# Patient Record
Sex: MALE | Race: WHITE | Employment: OTHER | URBAN - METROPOLITAN AREA
[De-identification: names, ages, dates, MRNs, and addresses within clinical notes are randomized per-mention and may not be internally consistent; named-entity substitution may affect disease eponyms.]

---

## 2017-01-18 ENCOUNTER — OFFICE VISIT (OUTPATIENT)
Dept: FAMILY MEDICINE CLINIC | Age: 77
End: 2017-01-18

## 2017-01-18 ENCOUNTER — HOSPITAL ENCOUNTER (OUTPATIENT)
Dept: LAB | Age: 77
Discharge: HOME | End: 2017-01-18
Payer: MEDICARE

## 2017-01-18 VITALS
WEIGHT: 208.4 LBS | OXYGEN SATURATION: 96 % | HEIGHT: 68 IN | TEMPERATURE: 98.6 F | SYSTOLIC BLOOD PRESSURE: 141 MMHG | BODY MASS INDEX: 31.58 KG/M2 | DIASTOLIC BLOOD PRESSURE: 57 MMHG | RESPIRATION RATE: 14 BRPM | HEART RATE: 54 BPM

## 2017-01-18 DIAGNOSIS — Z23 ENCOUNTER FOR IMMUNIZATION: ICD-10-CM

## 2017-01-18 DIAGNOSIS — Z00.00 MEDICARE ANNUAL WELLNESS VISIT, SUBSEQUENT: Primary | ICD-10-CM

## 2017-01-18 DIAGNOSIS — E03.9 ACQUIRED HYPOTHYROIDISM: ICD-10-CM

## 2017-01-18 PROCEDURE — 84443 ASSAY THYROID STIM HORMONE: CPT

## 2017-01-18 PROCEDURE — 84439 ASSAY OF FREE THYROXINE: CPT

## 2017-01-18 RX ORDER — LEVOTHYROXINE SODIUM 100 UG/1
TABLET ORAL
COMMUNITY
Start: 2016-11-07 | End: 2016-11-07

## 2017-01-18 RX ORDER — FLECAINIDE ACETATE 100 MG/1
TABLET ORAL
COMMUNITY
Start: 2016-11-07 | End: 2016-11-07

## 2017-01-18 NOTE — PROGRESS NOTES
Ramos Tariq is a 64 y.o. male and presents for annual Medicare Wellness Visit. Problem List: Reviewed with patient and discussed risk factors.     Patient Active Problem List   Diagnosis Code    Hearing loss H91.90    Prostate ca C61    Hypothyroidism E03.9    Unspecified seborrheic dermatitis L21.9    HTN (hypertension) I10    Hematuria, microscopic R31.29    Rectus diastasis M62.08    Glaucoma suspect H40.009    ED (erectile dysfunction) N52.9    Tear of meniscus of right knee S83.206A    Left inguinal hernia K40.90    Bilateral inguinal hernia K40.20    Seborrheic dermatitis, unspecified L21.9    Groin pain, left lower quadrant R10.32    Epididymitis, bilateral N45.1    Keratoacanthoma L85.8    Degenerative arthritis of left knee M17.9    Degenerative arthritis of hip M16.9       Current medical providers:  Patient Care Team:  Jaky Hull DO as PCP - General (Family Practice)    PSH: Reviewed with patient  Past Surgical History   Procedure Laterality Date    Hx radical prostatectomy  1994    Cystoscopy      Hx tonsillectomy      Hx knee arthroscopy  7/08     MENISCECTOMY LEFT    Hx knee arthroscopy       LEFT AND RIGHT ARTHROSCOPY    Hx urological       CYSTO    Hx gi       COLONOSCOPY    Endoscopy, colon, diagnostic  2002     -repeat 2012    Endoscopy, colon, diagnostic  11/19/07     w/polypectomy    Pr abdomen surgery proc unlisted  4/12     hernia-        SH: Reviewed with patient  Social History   Substance Use Topics    Smoking status: Never Smoker    Smokeless tobacco: Never Used    Alcohol use 3.0 oz/week     2 Glasses of wine, 4 Standard drinks or equivalent per week       FH: Reviewed with patient  Family History   Problem Relation Age of Onset   [address] [facility] Stroke Mother     Heart Disease Father      CHF    Diabetes Father     Cancer Sister      breast    Thyroid Disease Sister     Diabetes Daughter 9     insulin-dependent Medications/Allergies: Reviewed with patient  Current Outpatient Prescriptions on File Prior to Visit   Medication Sig Dispense Refill    levothyroxine (SYNTHROID) 137 mcg tablet Take 137 mcg by mouth Daily (before breakfast). 90 Tab 3    warfarin (COUMADIN) 5 mg tablet Take 7.5 mg by mouth daily.  flecainide (TAMBOCOR) 50 mg tablet Take  by mouth two (2) times a day.  ASCORBIC ACID (VITAMIN C PO) Take  by mouth daily.  CALCIUM POLYCARBOPHIL (FIBERCON PO) Take  by mouth two (2) times a day. No current facility-administered medications on file prior to visit. Allergies   Allergen Reactions    Pcn [Penicillins] Rash       Objective:  Visit Vitals    /57 (BP 1 Location: Left arm, BP Patient Position: Sitting)    Pulse (!) 54    Temp 98.6 °F (37 °C) (Oral)    Resp 14    Ht 5' 8\" (1.727 m)    Wt 208 lb 6.4 oz (94.5 kg)    SpO2 96%    BMI 31.69 kg/m2    Body mass index is 31.69 kg/(m^2). Assessment of cognitive impairment: Alert and oriented x 3    Depression Screen:   PHQ 2 / 9, over the last two weeks 1/18/2017   Little interest or pleasure in doing things Not at all   Feeling down, depressed or hopeless Not at all   Total Score PHQ 2 0       Fall Risk Assessment:    Fall Risk Assessment, last 12 mths 1/14/2016   Able to walk? Yes   Fall in past 12 months? Yes   Fall with injury? Yes   Number of falls in past 12 months 1   Fall Risk Score 2       Functional Ability:   Does the patient exhibit a steady gait? yes   How long did it take the patient to get up and walk from a sitting position? 2 seconds   Is the patient self reliant?  (ie can do own laundry, meals, household chores)  yes     Does the patient handle his/her own medications? yes     Does the patient handle his/her own money? yes     Is the patients home safe (ie good lighting, handrails on stairs and bath, etc.)? yes     Did you notice or did patient express any hearing difficulties?    no     Did you notice or did patient express any vision difficulties? Yes, wears glasses     Were distance and reading eye charts used? No, UTD on eye screening, followed by Dr. Prisca Chapman at [address]:   Patient was offered the opportunity to discuss advance care planning:  yes     Does patient have an Advance Directive:  yes   If no, did you provide information on Caring Connections? N/A       Plan:        Health Maintenance   Topic Date Due    Pneumococcal 65+ High/Highest Risk (2 of 2 - PPSV23) 10/14/2009    GLAUCOMA SCREENING Q2Y  06/18/2016    MEDICARE YEARLY EXAM  01/14/2017    DTaP/Tdap/Td series (2 - Td) 08/19/2019    ZOSTER VACCINE AGE 60>  Completed    INFLUENZA AGE 9 TO ADULT  Completed       *Patient verbalized understanding and agreement with the plan. A copy of the After Visit Summary with personalized health plan was given to the patient today. Follow-up Disposition:  Return in about 1 year (around 1/18/2018) for Annual wellness visit . Dr. Tahira Chen D.O.   Physician

## 2017-01-18 NOTE — PROGRESS NOTES
Chief Complaint   Patient presents with    Complete Physical       1. Have you been to the ER, urgent care clinic since your last visit? Hospitalized since your last visit? No    2. Have you seen or consulted any other health care providers outside of the [address] [place] [address] since your last visit? Include any pap smears or colon screening. No    Body mass index is 31.69 kg/(m^2).

## 2017-01-18 NOTE — PATIENT INSTRUCTIONS
Schedule of Personalized Health Plan  (Provide Copy to Patient)  The best way to stay healthy is to live a healthy lifestyle. A healthy lifestyle includes regular exercise, eating a well-balanced diet, keeping a healthy weight and not smoking. Regular physical exams and screening tests are another important way to take care of yourself. Preventive exams provided by health care providers can find health problems early when treatment works best and can keep you from getting certain diseases or illnesses. Preventive services include exams, lab tests, screenings, shots, monitoring and information to help you take care of your own health. All people over 65 should have a pneumonia shot. Pneumonia shots are usually only needed once in a lifetime unless your doctor decides differently. All people over 65 should have a yearly flu shot. People over 65 are at medium to high risk for Hepatitis B. Three shots are needed for complete protection. In addition to your physical exam, some screening tests are recommended:    Bone mass measurement (dexa scan) is recommended every two years if you have certain risk factors, such as personal history of vertebral fracture or chronic steroid medication use    Diabetes Mellitus screening is recommended every year. Glaucoma is an eye disease caused by high pressure in the eye. An eye exam is recommended every year. Cardiovascular screening tests that check your cholesterol and other blood fat (lipid) levels are recommended every five years. Colorectal Cancer screening tests help to find pre-cancerous polyps (growths in the colon) so they can be removed before they turn into cancer. Tests ordered for screening depend on your personal and family history risk factors.     Screening for Prostate Cancer is recommended yearly with a digital rectal exam and/or a PSA test    Here is a list of your current Health Maintenance items with a due date:  Health Maintenance Topic Date Due    Pneumococcal 65+ High/Highest Risk (2 of 2 - PPSV23) 10/14/2009    GLAUCOMA SCREENING Q2Y  06/18/2016    MEDICARE YEARLY EXAM  01/14/2017    DTaP/Tdap/Td series (2 - Td) 08/19/2019    ZOSTER VACCINE AGE 60>  Completed    INFLUENZA AGE 9 TO ADULT  Completed              Tennis Elbow: Exercises  Your Care Instructions  Here are some examples of typical rehabilitation exercises for your condition. Start each exercise slowly. Ease off the exercise if you start to have pain. Your doctor or physical therapist will tell you when you can start these exercises and which ones will work best for you. How to do the exercises  Wrist flexor stretch    1. Extend your arm in front of you with your palm up. 2. Bend your wrist, pointing your hand toward the floor. 3. With your other hand, gently bend your wrist farther until you feel a mild to moderate stretch in your forearm. 4. Hold for at least 15 to 30 seconds. Repeat 2 to 4 times. Wrist extensor stretch    Repeat steps 1 to 4 of the stretch above but begin with your extended hand palm down. Ball or sock squeeze    1. Hold a tennis ball (or a rolled-up sock) in your hand. 2. Make a fist around the ball (or sock) and squeeze. 3. Hold for about 6 seconds, and then relax for up to 10 seconds. 4. Repeat 8 to 12 times. 5. Switch the ball (or sock) to your other hand and do 8 to 12 times. Wrist deviation    1. Sit so that your arm is supported but your hand hangs off the edge of a flat surface, such as a table. 2. Hold your hand out like you are shaking hands with someone. 3. Move your hand up and down. 4. Repeat this motion 8 to 12 times. 5. Switch arms. 6. Try to do this exercise twice with each hand. Wrist curls    1. Place your forearm on a table with your hand hanging over the edge of the table, palm up. 2. Place a 1- to 2-pound weight in your hand. This may be a dumbbell, a can of food, or a filled water bottle.   3. Slowly raise and lower the weight while keeping your forearm on the table and your palm facing up. 4. Repeat this motion 8 to 12 times. 5. Switch arms, and do steps 1 through 4.  6. Repeat with your hand facing down toward the floor. Switch arms. Biceps curls    1. Sit leaning forward with your legs slightly spread and your left hand on your left thigh. 2. Place your right elbow on your right thigh, and hold the weight with your forearm horizontal.  3. Slowly curl the weight up and toward your chest.  4. Repeat this motion 8 to 12 times. 5. Switch arms, and do steps 1 through 4. Follow-up care is a key part of your treatment and safety. Be sure to make and go to all appointments, and call your doctor if you are having problems. It's also a good idea to know your test results and keep a list of the medicines you take. Where can you learn more? Go to http://jas-marielle.info/. Enter B144 in the search box to learn more about \"Tennis Elbow: Exercises. \"  Current as of: May 23, 2016  Content Version: 11.1  © 1091-3354 [employer]. Care instructions adapted under license by [employer] (which disclaims liability or warranty for this information). If you have questions about a medical condition or this instruction, always ask your healthcare professional. [address] any warranty or liability for your use of this information.

## 2017-01-18 NOTE — MR AVS SNAPSHOT
Visit Information Date & Time Provider Department Dept. Phone Encounter #  
 1/18/2017  9:00 AM Danielle Otto, [facility] [phone] [id number] Follow-up Instructions Return in about 1 year (around 1/18/2018) for Annual wellness visit . Routing History Upcoming Health Maintenance Date Due Pneumococcal 65+ High/Highest Risk (2 of 2 - PPSV23) 10/14/2009 GLAUCOMA SCREENING Q2Y 6/18/2016 MEDICARE YEARLY EXAM 1/14/2017 DTaP/Tdap/Td series (2 - Td) 8/19/2019 Allergies as of 1/18/2017  Review Complete On: 1/18/2017 By: Danielle Otto DO Severity Noted Reaction Type Reactions Pcn [Penicillins]  04/01/2010    Rash Current Immunizations  Reviewed on 1/18/2017 Name Date Influenza High Dose Vaccine PF 9/8/2016 Influenza Vaccine 8/20/2015 Influenza Vaccine Whole 9/2/2012 Pneumococcal Vaccine (Pcv) 8/19/2009 TDAP Vaccine 8/19/2009 Zoster 5/1/2007 Reviewed by Danielle Otto DO on 1/18/2017 at  9:39 AM  
 Reviewed by Danielle Otto DO on 1/18/2017 at  9:42 AM  
 Reviewed by Danielle Otto DO on 1/18/2017 at  9:43 AM  
You Were Diagnosed With   
  
 Codes Comments Medicare annual wellness visit, subsequent    -  Primary ICD-10-CM: Z00.00 ICD-9-CM: V70.0 Acquired hypothyroidism     ICD-10-CM: E03.9 ICD-9-CM: 244.9 Encounter for immunization     ICD-10-CM: P49 ICD-9-CM: V03.89 Vitals BP Pulse Temp Resp Height(growth percentile) Weight(growth percentile) 141/57 (BP 1 Location: Left arm, BP Patient Position: Sitting) (!) 54 98.6 °F (37 °C) (Oral) 14 5' 8\" (1.727 m) 208 lb 6.4 oz (94.5 kg) SpO2 BMI Smoking Status 96% 31.69 kg/m2 Never Smoker Vitals History BMI and BSA Data Body Mass Index Body Surface Area  
 31.69 kg/m 2 2.13 m 2 Preferred Pharmacy Pharmacy Name Phone [facility]/PHARMACY #[id number]- [place], [address] [facility] [phone] Your Updated Medication List  
  
   
This list is accurate as of: 17  9:57 AM.  Always use your most recent med list.  
  
  
  
  
 Herbie Felty Take  by mouth two (2) times a day. levothyroxine 137 mcg tablet Commonly known as:  SYNTHROID Take 137 mcg by mouth Daily (before breakfast). pneumococcal 13 ben conj dip 0.5 mL Syrg injection Commonly known as:  PREVNAR-13  
0.5 mL by IntraMUSCular route once for 1 dose. TAMBOCOR 50 mg tablet Generic drug:  flecainide Take  by mouth two (2) times a day. VITAMIN C PO Take  by mouth daily. warfarin 5 mg tablet Commonly known as:  COUMADIN Take 7.5 mg by mouth daily. Prescriptions Sent to Pharmacy Refills  
 pneumococcal 13 ben conj dip (PREVNAR-13) 0.5 mL syrg injection 0 Si.5 mL by IntraMUSCular route once for 1 dose. Class: Normal  
 Pharmacy: [facility]/pharmacy #[id number] [place], [address] [place], [place] [address] Ph #: [phone] Route: IntraMUSCular We Performed the Following T4, FREE [id number] CPT(R)] TSH 3RD GENERATION [29488 CPT(R)] Follow-up Instructions Return in about 1 year (around 2018) for Annual wellness visit . Patient Instructions Schedule of Personalized Health Plan (Provide Copy to Patient) The best way to stay healthy is to live a healthy lifestyle. A healthy lifestyle includes regular exercise, eating a well-balanced diet, keeping a healthy weight and not smoking. Regular physical exams and screening tests are another important way to take care of yourself. Preventive exams provided by health care providers can find health problems early when treatment works best and can keep you from getting certain diseases or illnesses.  Preventive services include exams, lab tests, screenings, shots, monitoring and information to help you take care of your own health. All people over 65 should have a pneumonia shot. Pneumonia shots are usually only needed once in a lifetime unless your doctor decides differently. All people over 65 should have a yearly flu shot. People over 65 are at medium to high risk for Hepatitis B. Three shots are needed for complete protection. In addition to your physical exam, some screening tests are recommended: 
 
Bone mass measurement (dexa scan) is recommended every two years if you have certain risk factors, such as personal history of vertebral fracture or chronic steroid medication use Diabetes Mellitus screening is recommended every year. Glaucoma is an eye disease caused by high pressure in the eye. An eye exam is recommended every year. Cardiovascular screening tests that check your cholesterol and other blood fat (lipid) levels are recommended every five years. Colorectal Cancer screening tests help to find pre-cancerous polyps (growths in the colon) so they can be removed before they turn into cancer. Tests ordered for screening depend on your personal and family history risk factors. Screening for Prostate Cancer is recommended yearly with a digital rectal exam and/or a PSA test 
 
Here is a list of your current Health Maintenance items with a due date: 
Health Maintenance Topic Date Due  Pneumococcal 65+ High/Highest Risk (2 of 2 - PPSV23) 10/14/2009  GLAUCOMA SCREENING Q2Y  06/18/2016  MEDICARE YEARLY EXAM  01/14/2017  
 DTaP/Tdap/Td series (2 - Td) 08/19/2019  ZOSTER VACCINE AGE 60>  Completed  INFLUENZA AGE 9 TO ADULT  Completed Tennis Elbow: Exercises Your Care Instructions Here are some examples of typical rehabilitation exercises for your condition. Start each exercise slowly. Ease off the exercise if you start to have pain. Your doctor or physical therapist will tell you when you can start these exercises and which ones will work best for you. How to do the exercises Wrist flexor stretch 1. Extend your arm in front of you with your palm up. 2. Bend your wrist, pointing your hand toward the floor. 3. With your other hand, gently bend your wrist farther until you feel a mild to moderate stretch in your forearm. 4. Hold for at least 15 to 30 seconds. Repeat 2 to 4 times. Wrist extensor stretch Repeat steps 1 to 4 of the stretch above but begin with your extended hand palm down. Ball or sock squeeze 1. Hold a tennis ball (or a rolled-up sock) in your hand. 2. Make a fist around the ball (or sock) and squeeze. 3. Hold for about 6 seconds, and then relax for up to 10 seconds. 4. Repeat 8 to 12 times. 5. Switch the ball (or sock) to your other hand and do 8 to 12 times. Wrist deviation 1. Sit so that your arm is supported but your hand hangs off the edge of a flat surface, such as a table. 2. Hold your hand out like you are shaking hands with someone. 3. Move your hand up and down. 4. Repeat this motion 8 to 12 times. 5. Switch arms. 6. Try to do this exercise twice with each hand. Wrist curls 1. Place your forearm on a table with your hand hanging over the edge of the table, palm up. 2. Place a 1- to 2-pound weight in your hand. This may be a dumbbell, a can of food, or a filled water bottle. 3. Slowly raise and lower the weight while keeping your forearm on the table and your palm facing up. 4. Repeat this motion 8 to 12 times. 5. Switch arms, and do steps 1 through 4. 
6. Repeat with your hand facing down toward the floor. Switch arms. Biceps curls 1. Sit leaning forward with your legs slightly spread and your left hand on your left thigh.  
2. Place your right elbow on your right thigh, and hold the weight with your forearm horizontal. 
3. Slowly curl the weight up and toward your chest. 
 4. Repeat this motion 8 to 12 times. 5. Switch arms, and do steps 1 through 4. Follow-up care is a key part of your treatment and safety. Be sure to make and go to all appointments, and call your doctor if you are having problems. It's also a good idea to know your test results and keep a list of the medicines you take. Where can you learn more? Go to http://jas-marielle.info/. Enter D229 in the search box to learn more about \"Tennis Elbow: Exercises. \" Current as of: May 23, 2016 Content Version: 11.1 © 9361-6103 [employer]. Care instructions adapted under license by [employer] (which disclaims liability or warranty for this information). If you have questions about a medical condition or this instruction, always ask your healthcare professional. [address] any warranty or liability for your use of this information. Introducing hospitals & HEALTH SERVICES! Rajwinder Goins introduces Cube Route patient portal. Now you can access parts of your medical record, email your doctor's office, and request medication refills online. 1. In your internet browser, go to [email or web address] [employer]/Loot! 2. Click on the First Time User? Click Here link in the Sign In box. You will see the New Member Sign Up page. 3. Enter your Cube Route Access Code exactly as it appears below. You will not need to use this code after youve completed the sign-up process. If you do not sign up before the expiration date, you must request a new code. · Cube Route Access Code: [email or web address] Expires: 4/18/2017  9:39 AM 
 
4. Enter the last four digits of your Social Security Number (xxxx) and Date of Birth (mm/dd/yyyy) as indicated and click Submit. You will be taken to the next sign-up page. 5. Create a Cube Route ID. This will be your Cube Route login ID and cannot be changed, so think of one that is secure and easy to remember. 6. Create a [email or web address] password. You can change your password at any time. 7. Enter your Password Reset Question and Answer. This can be used at a later time if you forget your password. 8. Enter your e-mail address. You will receive e-mail notification when new information is available in [address] 9. Click Sign Up. You can now view and download portions of your medical record. 10. Click the Download Summary menu link to download a portable copy of your medical information. If you have questions, please visit the Frequently Asked Questions section of the [email or web address] website. Remember, [email or web address] is NOT to be used for urgent needs. For medical emergencies, dial 911. Now available from your iPhone and Android! Please provide this summary of care documentation to your next provider. Your primary care clinician is listed as Chilo Luna. If you have any questions after today's visit, please call [phone].

## 2017-01-18 NOTE — PROGRESS NOTES
Subjective:      Joel Roman is an 64 y.o. male who presents for followup of hypothyroidism. Thyroid ROS:     fatigue, weight gain, feeling cold and cold intolerance, constipation, swelling, feeling slow, losing hair, anxiousness, feeling excessive energy, tremulousness, palpitations and sweating.      Has some left hand weakness and strength deficit with pouring gallon of milk, not with lower weight things; he is right hand dominant    Occasional low back stiffness     Exercising regularly at the [facility] (every other day); cardio and weights    Sleeping well at night     Wt Readings from Last 3 Encounters:   01/18/17 208 lb 6.4 oz (94.5 kg)   06/24/16 203 lb 6.4 oz (92.3 kg)   03/15/16 199 lb 12.8 oz (90.6 kg)     Patient Active Problem List   Diagnosis Code    Hearing loss H91.90    Prostate ca C61    Hypothyroidism E03.9    Unspecified seborrheic dermatitis L21.9    HTN (hypertension) I10    Hematuria, microscopic R31.29    Rectus diastasis M62.08    Glaucoma suspect H40.009    ED (erectile dysfunction) N52.9    Tear of meniscus of right knee S83.206A    Left inguinal hernia K40.90    Bilateral inguinal hernia K40.20    Seborrheic dermatitis, unspecified L21.9    Groin pain, left lower quadrant R10.32    Epididymitis, bilateral N45.1    Keratoacanthoma L85.8    Degenerative arthritis of left knee M17.9    Degenerative arthritis of hip M16.9     Patient Active Problem List    Diagnosis Date Noted    Degenerative arthritis of left knee 10/20/2015    Degenerative arthritis of hip 10/20/2015    Keratoacanthoma 12/24/2014    Groin pain, left lower quadrant 06/29/2012    Epididymitis, bilateral 06/29/2012    Left inguinal hernia 03/21/2012    Bilateral inguinal hernia 03/21/2012    Tear of meniscus of right knee 10/26/2011    ED (erectile dysfunction) 08/23/2010    Hearing loss 04/01/2010    Prostate ca 04/01/2010    Hypothyroidism 04/01/2010    Unspecified seborrheic dermatitis 04/01/2010    HTN (hypertension) 04/01/2010    Hematuria, microscopic 04/01/2010    Rectus diastasis 04/01/2010    Glaucoma suspect 04/01/2010    Seborrheic dermatitis, unspecified 04/01/2010     Current Outpatient Prescriptions   Medication Sig Dispense Refill    pneumococcal 13 ben conj dip (PREVNAR-13) 0.5 mL syrg injection 0.5 mL by IntraMUSCular route once for 1 dose. 0.5 mL 0    levothyroxine (SYNTHROID) 137 mcg tablet Take 137 mcg by mouth Daily (before breakfast). 90 Tab 3    warfarin (COUMADIN) 5 mg tablet Take 7.5 mg by mouth daily.  flecainide (TAMBOCOR) 50 mg tablet Take  by mouth two (2) times a day.  ASCORBIC ACID (VITAMIN C PO) Take  by mouth daily.  CALCIUM POLYCARBOPHIL (FIBERCON PO) Take  by mouth two (2) times a day.        Allergies   Allergen Reactions    Pcn [Penicillins] Rash     Past Medical History   Diagnosis Date    Arrhythmia      leaky valve and irregular heartbeat    Arthritis     Cancer [facility])      prostate    ED (erectile dysfunction) 8/23/2010    Glaucoma suspect 4/1/2010    Hearing loss 4/1/2010    Hematuria, microscopic 4/1/2010    HTN (hypertension) 4/1/2010     NO MEDICATION     Hypothyroidism 4/1/2010    Prostate CA (HCC)     Rectus diastasis 4/1/2010    Seborrheic dermatitis, unspecified 4/1/2010    Thyroid disease     Unspecified adverse effect of anesthesia      DIFFICULT TO AWAKEN        Objective:     Visit Vitals    /57 (BP 1 Location: Left arm, BP Patient Position: Sitting)    Pulse (!) 54    Temp 98.6 °F (37 °C) (Oral)    Resp 14    Ht 5' 8\" (1.727 m)    Wt 208 lb 6.4 oz (94.5 kg)    SpO2 96%    BMI 31.69 kg/m2     Gen: NAD  Neuro: mild resting tremor, faint, bilaterally with outstretched arms at hands without intentional tremor  Gait and station are normal  Psych: normal affect and mood    Laboratory:  Lab Results   Component Value Date/Time    TSH 6.770 01/14/2016 10:01 AM       Assessment/Plan:         ICD-10-CM ICD-9-CM    1. Acquired hypothyroidism    Has been well controlled    Only needs annual testing of thyroid  E03.9 244.9 TSH 3RD GENERATION        T4, FREE       2. Encounter for immunization Z23 V03.89 pneumococcal 13 ben conj dip (PREVNAR-13) 0.5 mL syrg injection    Sent to pharmacy      Follow-up Disposition:  Return in about 1 year (around 1/18/2018) for Annual wellness visit . and thyroid follow up    Dr. Fortunato Bellamy D.O.   Physician

## 2017-01-19 LAB
T4 FREE SERPL-MCNC: 1.81 NG/DL (ref 0.82–1.77)
TSH SERPL DL<=0.005 MIU/L-ACNC: 6.09 UIU/ML (ref 0.45–4.5)

## 2017-01-26 RX ORDER — LEVOTHYROXINE SODIUM 125 UG/1
125 TABLET ORAL
Qty: 60 TAB | Refills: 0 | Status: SHIPPED | OUTPATIENT
Start: 2017-01-26 | End: 2017-04-03

## 2017-01-26 NOTE — PROGRESS NOTES
I informed pt that target TSH is 6-8, given his age and history of afib, I want to decrease his thyroid medication just a bit to increase his TSH closer to midpoint since he is on lower end of 6-8 range currently. I discussed that these changes will make sure he stays controlled for both conditions. [place] and [place], I informed pt to come in to clinic in 6 weeks for labs only and then follow up in office with Dr. Josseline Hopkins in 7 weeks to review labs and adjust medications PRN. Please send copy of these labs and this lab note to Dr. Farhana Jenkins, his cardiologist so that he is aware as well.      CV

## 2017-03-23 ENCOUNTER — HOSPITAL ENCOUNTER (OUTPATIENT)
Dept: LAB | Age: 77
Discharge: HOME | End: 2017-03-23
Payer: MEDICARE

## 2017-03-23 DIAGNOSIS — E03.9 ACQUIRED HYPOTHYROIDISM: ICD-10-CM

## 2017-03-23 PROCEDURE — 84439 ASSAY OF FREE THYROXINE: CPT

## 2017-03-23 PROCEDURE — 84443 ASSAY THYROID STIM HORMONE: CPT

## 2017-03-23 PROCEDURE — 36415 COLL VENOUS BLD VENIPUNCTURE: CPT

## 2017-03-24 LAB
T4 FREE SERPL-MCNC: 1.85 NG/DL (ref 0.82–1.77)
TSH SERPL DL<=0.005 MIU/L-ACNC: 6.22 UIU/ML (ref 0.45–4.5)

## 2017-04-03 ENCOUNTER — OFFICE VISIT (OUTPATIENT)
Dept: FAMILY MEDICINE CLINIC | Age: 77
End: 2017-04-03

## 2017-04-03 VITALS
WEIGHT: 208.38 LBS | RESPIRATION RATE: 18 BRPM | OXYGEN SATURATION: 98 % | HEIGHT: 68 IN | SYSTOLIC BLOOD PRESSURE: 130 MMHG | HEART RATE: 59 BPM | BODY MASS INDEX: 31.58 KG/M2 | DIASTOLIC BLOOD PRESSURE: 56 MMHG | TEMPERATURE: 98.2 F

## 2017-04-03 DIAGNOSIS — E03.9 ACQUIRED HYPOTHYROIDISM: Primary | ICD-10-CM

## 2017-04-03 DIAGNOSIS — M54.41 MIDLINE LOW BACK PAIN WITH RIGHT-SIDED SCIATICA, UNSPECIFIED CHRONICITY: ICD-10-CM

## 2017-04-03 RX ORDER — LEVOTHYROXINE SODIUM 150 UG/1
150 TABLET ORAL
Qty: 30 TAB | Refills: 0 | Status: SHIPPED | OUTPATIENT
Start: 2017-04-03 | End: 2017-05-01 | Stop reason: SDUPTHER

## 2017-04-03 RX ORDER — LEVOTHYROXINE SODIUM 137 UG/1
137 TABLET ORAL
Qty: 30 TAB | Refills: 0
Start: 2017-04-03 | End: 2017-05-01

## 2017-04-03 NOTE — PATIENT INSTRUCTIONS
Take levothyroxine 137 mcg daily for one month, then increase your levothyroxine 150 mcg daily for one month. Come back to lab in 2 months to recheck thyroid level. Hypothyroidism: Care Instructions  Your Care Instructions  You have hypothyroidism, which means that your body is not making enough thyroid hormone. This hormone helps your body use energy. If your thyroid level is low, you may feel tired, be constipated, have an increase in your blood pressure, or have dry skin or memory problems. You may also get cold easily, even when it is warm. Women with low thyroid levels may have heavy menstrual periods. A blood test to find your thyroid-stimulating hormone (TSH) level is used to check for hypothyroidism. A high TSH level may mean that you have low thyroid. When your body is not making enough thyroid hormone, TSH levels rise in an effort to make the body produce more. The treatment for hypothyroidism is to take thyroid hormone pills. You should start to feel better in 1 to 2 weeks. But it can take several months to see changes in the TSH level. You will need regular visits with your doctor to make sure you have the right dose of medicine. Most people need treatment for the rest of their lives. You will need to see your doctor regularly to have blood tests and to make sure you are doing well. Follow-up care is a key part of your treatment and safety. Be sure to make and go to all appointments, and call your doctor if you are having problems. Its also a good idea to know your test results and keep a list of the medicines you take. How can you care for yourself at home? · Take your thyroid hormone medicine exactly as prescribed. Call your doctor if you think you are having a problem with your medicine. Most people do not have side effects if they take the right amount of medicine regularly. ¨ Take the medicine 30 minutes before breakfast, and do not take it with calcium, vitamins, or iron.   ¨ Do not take extra doses of your thyroid medicine. It will not help you get better any faster, and it may cause side effects. ¨ If you forget to take a dose, do NOT take a double dose of medicine. Take your usual dose the next day. · Tell your doctor about all prescription, herbal, or over-the-counter products you take. · Take care of yourself. Eat a healthy diet, get enough sleep, and get regular exercise. When should you call for help? Call 911 anytime you think you may need emergency care. For example, call if:  · You passed out (lost consciousness). · You have severe trouble breathing. · You have a very slow heartbeat (less than 60 beats a minute). · You have a low body temperature (95°F or below). Call your doctor now or seek immediate medical care if:  · You feel tired, sluggish, or weak. · You have trouble remembering things or concentrating. · You do not begin to feel better 2 weeks after starting your medicine. Watch closely for changes in your health, and be sure to contact your doctor if you have any problems. Where can you learn more? Go to http://jas-marielle.info/. Enter B705 in the search box to learn more about \"Hypothyroidism: Care Instructions. \"  Current as of: July 28, 2016  Content Version: 11.2  © 1847-4149 [employer]. Care instructions adapted under license by [employer] (which disclaims liability or warranty for this information). If you have questions about a medical condition or this instruction, always ask your healthcare professional. [address] any warranty or liability for your use of this information.

## 2017-04-03 NOTE — MR AVS SNAPSHOT
Visit Information Date & Time Provider Department Dept. Phone Encounter #  
 4/3/2017  2:15 PM Tish King  [place] [phone] [id number] Follow-up Instructions Return for 2 month for TSH lab work. Upcoming Health Maintenance Date Due Pneumococcal 65+ High/Highest Risk (2 of 2 - PPSV23) 3/15/2017 MEDICARE YEARLY EXAM 1/19/2018 GLAUCOMA SCREENING Q2Y 7/6/2018 DTaP/Tdap/Td series (2 - Td) 8/19/2019 Allergies as of 4/3/2017  Review Complete On: 4/3/2017 By: Indira Garcia LPN Severity Noted Reaction Type Reactions Pcn [Penicillins]  04/01/2010    Rash Current Immunizations  Reviewed on 4/3/2017 Name Date Influenza High Dose Vaccine PF 9/8/2016 Influenza Vaccine 8/20/2015 Influenza Vaccine Whole 9/2/2012 Pneumococcal Conjugate (PCV-13) 1/18/2017 Pneumococcal Vaccine (Pcv) 8/19/2009 TDAP Vaccine 8/19/2009 Zoster 5/1/2007 Reviewed by Indira Garcia LPN on 1/7/9441 at  7:70 PM  
Vitals BP Pulse Temp Resp Height(growth percentile) Weight(growth percentile) 130/56 (BP 1 Location: Left arm, BP Patient Position: Sitting) (!) 59 98.2 °F (36.8 °C) (Oral) 18 5' 8\" (1.727 m) 208 lb 6 oz (94.5 kg) SpO2 BMI Smoking Status 98% 31.68 kg/m2 Never Smoker BMI and BSA Data Body Mass Index Body Surface Area  
 31.68 kg/m 2 2.13 m 2 Preferred Pharmacy Pharmacy Name Phone [id number] [place], [address] [phone] Your Updated Medication List  
  
   
This list is accurate as of: 4/3/17  2:43 PM.  Always use your most recent med list.  
  
  
  
  
 [place] Gauss Take  by mouth two (2) times a day. * levothyroxine 150 mcg tablet Commonly known as:  SYNTHROID Take 1 Tab by mouth Daily (before breakfast). * levothyroxine 137 mcg tablet Commonly known as:  SYNTHROID  
 Take 137 mcg by mouth Daily (before breakfast). TAMBOCOR 50 mg tablet Generic drug:  flecainide Take  by mouth two (2) times a day. VITAMIN C PO Take  by mouth daily. warfarin 5 mg tablet Commonly known as:  COUMADIN Take 7.5 mg by mouth daily. * Notice: This list has 2 medication(s) that are the same as other medications prescribed for you. Read the directions carefully, and ask your doctor or other care provider to review them with you. Prescriptions Sent to Pharmacy Refills  
 levothyroxine (SYNTHROID) 150 mcg tablet 0 Sig: Take 1 Tab by mouth Daily (before breakfast). Class: Normal  
 Pharmacy: [id number], [address] [place] [address] #: [phone] Route: Oral  
  
Follow-up Instructions Return for 2 month for TSH lab work. Patient Instructions Take levothyroxine 137 mcg daily for one month, then increase your levothyroxine 150 mcg daily for one month. Come back to lab in 2 months to recheck thyroid level. Hypothyroidism: Care Instructions Your Care Instructions You have hypothyroidism, which means that your body is not making enough thyroid hormone. This hormone helps your body use energy. If your thyroid level is low, you may feel tired, be constipated, have an increase in your blood pressure, or have dry skin or memory problems. You may also get cold easily, even when it is warm. Women with low thyroid levels may have heavy menstrual periods. A blood test to find your thyroid-stimulating hormone (TSH) level is used to check for hypothyroidism. A high TSH level may mean that you have low thyroid. When your body is not making enough thyroid hormone, TSH levels rise in an effort to make the body produce more. The treatment for hypothyroidism is to take thyroid hormone pills. You should start to feel better in 1 to 2 weeks.  But it can take several months to see changes in the TSH level. You will need regular visits with your doctor to make sure you have the right dose of medicine. Most people need treatment for the rest of their lives. You will need to see your doctor regularly to have blood tests and to make sure you are doing well. Follow-up care is a key part of your treatment and safety. Be sure to make and go to all appointments, and call your doctor if you are having problems. Its also a good idea to know your test results and keep a list of the medicines you take. How can you care for yourself at home? · Take your thyroid hormone medicine exactly as prescribed. Call your doctor if you think you are having a problem with your medicine. Most people do not have side effects if they take the right amount of medicine regularly. ¨ Take the medicine 30 minutes before breakfast, and do not take it with calcium, vitamins, or iron. ¨ Do not take extra doses of your thyroid medicine. It will not help you get better any faster, and it may cause side effects. ¨ If you forget to take a dose, do NOT take a double dose of medicine. Take your usual dose the next day. · Tell your doctor about all prescription, herbal, or over-the-counter products you take. · Take care of yourself. Eat a healthy diet, get enough sleep, and get regular exercise. When should you call for help? Call 911 anytime you think you may need emergency care. For example, call if: 
· You passed out (lost consciousness). · You have severe trouble breathing. · You have a very slow heartbeat (less than 60 beats a minute). · You have a low body temperature (95°F or below). Call your doctor now or seek immediate medical care if: 
· You feel tired, sluggish, or weak. · You have trouble remembering things or concentrating. · You do not begin to feel better 2 weeks after starting your medicine.  
Watch closely for changes in your health, and be sure to contact your doctor if you have any problems. Where can you learn more? Go to http://jas-marielle.info/. Enter F664 in the search box to learn more about \"Hypothyroidism: Care Instructions. \" Current as of: July 28, 2016 Content Version: 11.2 © 8993-3518 [employer]. Care instructions adapted under license by [employer] (which disclaims liability or warranty for this information). If you have questions about a medical condition or this instruction, always ask your healthcare professional. [address] any warranty or liability for your use of this information. Introducing [facility]! Keely Gilma introduces Hundo patient portal. Now you can access parts of your medical record, email your doctor's office, and request medication refills online. 1. In your internet browser, go to [email or web address] I Like 2. Click on the First Time User? Click Here link in the Sign In box. You will see the New Member Sign Up page. 3. Enter your Hundo Access Code exactly as it appears below. You will not need to use this code after youve completed the sign-up process. If you do not sign up before the expiration date, you must request a new code. · Hundo Access Code: [email or web address] Expires: 4/18/2017 10:39 AM 
 
4. Enter the last four digits of your Social Security Number (xxxx) and Date of Birth (mm/dd/yyyy) as indicated and click Submit. You will be taken to the next sign-up page. 5. Create a Hundo ID. This will be your Hundo login ID and cannot be changed, so think of one that is secure and easy to remember. 6. Create a Hundo password. You can change your password at any time. 7. Enter your Password Reset Question and Answer. This can be used at a later time if you forget your password. 8. Enter your e-mail address. You will receive e-mail notification when new information is available in [address] 9. Click Sign Up. You can now view and download portions of your medical record. 10. Click the Download Summary menu link to download a portable copy of your medical information. If you have questions, please visit the Frequently Asked Questions section of the Hy[employer]os website. Remember, Hyglos is NOT to be used for urgent needs. For medical emergencies, dial 911. Now available from your iPhone and Android! Please provide this summary of care documentation to your next provider. Your primary care clinician is listed as Annie Nur. If you have any questions after today's visit, please call [phone].

## 2017-04-03 NOTE — PROGRESS NOTES
Patient Name: Celia Kim   MRN: [id number]    Ariana Zaragoza is a 64 y.o. male who presents with the following: Transferring care from prior PCP Dr. Divya Vaughan. Hypothyroidism  Reports long-standing history of low thyroid diagnosed via blood work. Denies history of surgery or radiation. Has been compliant with medications. Current dose is levothyroxine 125 mcg daily per her prior PCPs recommendation. Per chart review, PCPs prior target TSH goal was between 6 and 8 due to history of atrial fibrillation. She decreased his thyroid dose in mid January after reviewing that his TSH was 6.090 and she wanted the TSH to be higher. Patient reports his A. fib is well controlled on warfarin and flecainide, followed by his cardiologist.  Jordan Shines asymptomatic without palpitations, shortness of breath, or chest pain. Lab Results   Component Value Date/Time    TSH 6.220 03/23/2017 08:14 AM       S/p MOHS surgery 2/22/2017 for skin cancer on his scalp. Applying vaseline daily without pain or discharge. Has follow up next week. Has had some mild low back pain with some radiating pain to his R thigh. Has seen an orthopedic for this. Treatment so far has included a steroid burst and currently in week 3 of PT. Has not resumed his [employer] regimen since MOHS surgery; will plan to do so soon. Review of Systems   Constitutional: Negative for fever, malaise/fatigue and weight loss. Respiratory: Negative for cough, hemoptysis, shortness of breath and wheezing. Cardiovascular: Negative for chest pain, palpitations, leg swelling and PND. Gastrointestinal: Negative for abdominal pain, constipation, diarrhea, nausea and vomiting. Musculoskeletal: Positive for back pain. Negative for falls. The patient's medications, allergies, past medical history, surgical history, family history and social history were reviewed and updated where appropriate.       Prior to Admission medications    Medication Sig Start Date End Date Taking? Authorizing Provider   levothyroxine (SYNTHROID) 125 mcg tablet Take 1 Tab by mouth Daily (before breakfast). CHANGE OF THERAPY 1/26/17  Yes Addison Yanez,    warfarin (COUMADIN) 5 mg tablet Take 7.5 mg by mouth daily. 11/3/14  Yes Historical Provider   flecainide (TAMBOCOR) 50 mg tablet Take  by mouth two (2) times a day. Yes Historical Provider   ASCORBIC ACID (VITAMIN C PO) Take  by mouth daily. Yes Historical Provider   CALCIUM POLYCARBOPHIL (FIBERCON PO) Take  by mouth two (2) times a day. Yes Historical Provider       Allergies   Allergen Reactions    Pcn [Penicillins] Rash         Past Medical History:   Diagnosis Date    Aortic insufficiency     [facility]rial fibrillation ([facility] [address]     Hearing loss 4/1/2010    Hematuria, microscopic 4/1/2010    Hypothyroidism 4/1/2010    On warfarin therapy     Prostate CA ([facility] [address]     Rectus diastasis 4/1/2010    Seborrheic dermatitis, unspecified 4/1/2010    Unspecified adverse effect of anesthesia     DIFFICULT TO AWAKEN       Past Surgical History:   Procedure Laterality Date    ABDOMEN SURGERY PROC UNLISTED  4/12    hernia-    ENDOSCOPY, COLON, DIAGNOSTIC  11/19/07    w/polypectomy    HX GI      COLONOSCOPY    HX KNEE ARTHROSCOPY  7/08    MENISCECTOMY LEFT    HX KNEE ARTHROSCOPY      LEFT AND RIGHT ARTHROSCOPY    HX RADICAL PROSTATECTOMY  1994    HX TONSILLECTOMY         Family History   Problem Relation Age of Onset    Stroke Mother     Heart Disease Father      CHF    Diabetes Father     Cancer Sister      breast    Thyroid Disease Sister     Diabetes Daughter 5     insulin-dependent       Social History     Social History    Marital status:      Spouse name: N/A    Number of children: N/A    Years of education: N/A     Occupational History    Not on file.      Social History Main Topics    Smoking status: Never Smoker    Smokeless tobacco: Never Used    Alcohol use 3.0 oz/week     2 Glasses of wine, 4 Standard drinks or equivalent per week      Comment: moderate    Drug use: No    Sexual activity: Yes     Partners: Female     Other Topics Concern     Service Yes    Blood Transfusions Yes    Caffeine Concern No    Occupational Exposure No    Hobby Hazards No    Sleep Concern Yes     wakes up in the night     Stress Concern No    Weight Concern Yes     cant keep it off     Special Diet No    Back Care Yes     lower back pain     Exercise Yes     [employer]     Bike Helmet No     NA    Seat Belt Yes    Self-Exams No     NA     Social History Narrative           OBJECTIVE    Visit Vitals    /56 (BP 1 Location: Left arm, BP Patient Position: Sitting)    Pulse (!) 59    Temp 98.2 °F (36.8 °C) (Oral)    Resp 18    Ht 5' 8\" (1.727 m)    Wt 208 lb 6 oz (94.5 kg)    SpO2 98%    BMI 31.68 kg/m2       Physical Exam   Constitutional: He is well-developed, well-nourished, and in no distress. No distress. HENT:   Head: Normocephalic and atraumatic. Right Ear: External ear normal.   Left Ear: External ear normal.   Eyes: Conjunctivae and EOM are normal. Pupils are equal, round, and reactive to light. Neck: Normal range of motion. Neck supple. No thyromegaly present. Cardiovascular: Normal rate, regular rhythm and normal heart sounds. Exam reveals no gallop and no friction rub. No murmur heard. Pulmonary/Chest: Effort normal and breath sounds normal. No respiratory distress. He has no wheezes. Skin: He is not diaphoretic. Psychiatric: Mood, memory, affect and judgment normal.   Nursing note and vitals reviewed. ASSESSMENT AND PLAN  Lesvia Cummings is a 64 y.o. male who presents today for:    1. Acquired hypothyroidism  Discussed with patient at length that given his age and co morbidity of atrial fibrillation, the ideal TSH range would be between 2 to 4; prior TSH values have ranged between 5 to 6.  Reviewed that risk of atrial fibrillation is highest if TSH is below 1.0 mU/L which patient is well above that level. Will increase dose from 125 of levothyroxine to 137 mg for one month (pt has pills left from prior RX), then increased to 150 mcg daily for one month. Pt to RTC for lab check in 2 months for TSH. Pt will notify clinic if he develops any side effects from dose change. 2. Midline low back pain with right-sided sciatica, unspecified chronicity  Currently undergoing PT; will continue to monitor. Discussed with pt that if symptoms persist, may need follow up with his orthopedic doctor. Medications Discontinued During This Encounter   Medication Reason    levothyroxine (SYNTHROID) 125 mcg tablet Not A Current Medication       Follow-up Disposition:  Return for 2 month for TSH lab work. Medication risks/benefits/costs/interactions/alternatives discussed with patient. Advised patient to call back or return to office if symptoms worsen/change/persist. If patient cannot reach us or should anything more severe/urgent arise he/she should proceed directly to the nearest emergency department. Discussed expected course/resolution/complications of diagnosis in detail with patient. Patient given a written after visit summary which includes his/her diagnoses, current medications and vitals. Patient expressed understanding with the diagnosis and plan.      Deb Kennedy M.D.

## 2017-05-01 RX ORDER — LEVOTHYROXINE SODIUM 150 UG/1
TABLET ORAL
Qty: 30 TAB | Refills: 0 | Status: SHIPPED | OUTPATIENT
Start: 2017-05-01 | End: 2017-06-09 | Stop reason: SDUPTHER

## 2017-05-01 NOTE — TELEPHONE ENCOUNTER
Received automatic refill of levothyroxine 150 mcg. Please remind pt to come by lab for TSH check in one month; he should have completed 135 mcg daily x 1 month then increased to 150 mcg daily (should be on this dose currently).

## 2017-06-09 NOTE — TELEPHONE ENCOUNTER
Contact # is [phone]    Patient states that he is supposed to be coming in to do some lab work for his thyroid medication. He would like to come in Monday; no pending lab orders in system.      Patient is also requesting a refill on medication:  Requested Prescriptions     Pending Prescriptions Disp Refills    levothyroxine (SYNTHROID) 150 mcg tablet 30 Tab 0

## 2017-06-12 ENCOUNTER — HOSPITAL ENCOUNTER (OUTPATIENT)
Dept: LAB | Age: 77
Discharge: HOME | End: 2017-06-12
Payer: MEDICARE

## 2017-06-12 PROCEDURE — 84443 ASSAY THYROID STIM HORMONE: CPT

## 2017-06-12 PROCEDURE — 84439 ASSAY OF FREE THYROXINE: CPT

## 2017-06-13 LAB
T4 FREE SERPL-MCNC: 1.85 NG/DL (ref 0.82–1.77)
TSH SERPL DL<=0.005 MIU/L-ACNC: 3.73 UIU/ML (ref 0.45–4.5)

## 2017-06-13 RX ORDER — LEVOTHYROXINE SODIUM 150 UG/1
TABLET ORAL
Qty: 90 TAB | Refills: 0 | Status: SHIPPED | OUTPATIENT
Start: 2017-06-13 | End: 2017-09-05 | Stop reason: SDUPTHER

## 2017-06-13 NOTE — PROGRESS NOTES
Please notify patient regarding their test results:    TSH WNL; continue current levothyroxine med dose; refill placed.

## 2017-09-05 RX ORDER — LEVOTHYROXINE SODIUM 150 UG/1
TABLET ORAL
Qty: 90 TAB | Refills: 0 | Status: SHIPPED | OUTPATIENT
Start: 2017-09-05 | End: 2017-11-02 | Stop reason: SDUPTHER

## 2017-11-02 RX ORDER — LEVOTHYROXINE SODIUM 150 UG/1
TABLET ORAL
Qty: 90 TAB | Refills: 1 | Status: SHIPPED | OUTPATIENT
Start: 2017-11-02 | End: 2018-05-01 | Stop reason: SDUPTHER

## 2017-11-02 NOTE — TELEPHONE ENCOUNTER
Patient states he needs his rx refilled he is requesting multiple refills on the medication   Pharmacy no file, 90 days supply  Best call back # Ana Laura Restrepo [phone]  .   Requested Prescriptions     Pending Prescriptions Disp Refills    levothyroxine (SYNTHROID) 150 mcg tablet 90 Tab 0     Sig: TAKE 1 TABLET BY MOUTH BEFORE BREAKFAST

## 2017-11-30 ENCOUNTER — OFFICE VISIT (OUTPATIENT)
Dept: FAMILY MEDICINE CLINIC | Age: 77
End: 2017-11-30

## 2017-11-30 VITALS
TEMPERATURE: 98.2 F | SYSTOLIC BLOOD PRESSURE: 149 MMHG | DIASTOLIC BLOOD PRESSURE: 60 MMHG | OXYGEN SATURATION: 94 % | RESPIRATION RATE: 15 BRPM | HEART RATE: 66 BPM | BODY MASS INDEX: 32.58 KG/M2 | WEIGHT: 215 LBS | HEIGHT: 68 IN

## 2017-11-30 DIAGNOSIS — H61.22 LEFT EAR IMPACTED CERUMEN: ICD-10-CM

## 2017-11-30 DIAGNOSIS — H90.12 CONDUCTIVE HEARING LOSS OF LEFT EAR, UNSPECIFIED HEARING STATUS ON CONTRALATERAL SIDE: Primary | ICD-10-CM

## 2017-11-30 RX ORDER — FLECAINIDE ACETATE 100 MG/1
100 TABLET ORAL 2 TIMES DAILY
COMMUNITY
Start: 2017-10-31 | End: 2021-02-01 | Stop reason: ALTCHOICE

## 2017-11-30 NOTE — PROGRESS NOTES
Assessment/Plan:     Diagnoses and all orders for this visit:    1. Conductive hearing loss of left ear, unspecified hearing status on contralateral side    2. Left ear impacted cerumen      Follow-up Disposition:  Return if symptoms worsen or fail to improve. Discussed expected course/resolution/complications of diagnosis in detail with patient.    Medication risks/benefits/costs/interactions/alternatives discussed with patient.    Pt was given after visit summary which includes diagnoses, current medications & vitals. Pt expressed understanding with the diagnosis and plan    Procedure Note for removal of cerumen impaction:  Large amounts of hard soft ear wax left ear. Cerumen was removed by warm water flush, he tolerated fairly well, no complications. Subjective:      Fred Fall is a 76 y.o. male who presents for had concerns including Ear Fullness. Ear fullness  No recent illness. Left ear feels full. Went to theater in [place] on Sunday and used an auditory device to help hear with long ear buds. Has increasing hearing loss since the trip with fullness feeling today. 12-15 years ago, right ear \"stopped working\"  Saw auditory down at [facility] and took methotrexate with some success. Hearing came back some but then left and has not come back. No diagnosis, no tumor, no infection. bp up today but usually ok, followed by cardiology    Current Outpatient Prescriptions   Medication Sig Dispense Refill    flecainide (TAMBOCOR) 100 mg tablet       levothyroxine (SYNTHROID) 150 mcg tablet TAKE 1 TABLET BY MOUTH BEFORE BREAKFAST 90 Tab 1    warfarin (COUMADIN) 5 mg tablet Take 7.5 mg by mouth daily.  ASCORBIC ACID (VITAMIN C PO) Take  by mouth daily.  CALCIUM POLYCARBOPHIL (FIBERCON PO) Take  by mouth two (2) times a day.  flecainide (TAMBOCOR) 50 mg tablet Take  by mouth two (2) times a day.          Allergies   Allergen Reactions    Pcn [Penicillins] Rash       ROS:   Review of Systems   HENT: Positive for hearing loss. Objective:     Visit Vitals    /60 (BP 1 Location: Left arm, BP Patient Position: Sitting)    Pulse 66    Temp 98.2 °F (36.8 °C) (Oral)    Resp 15    Ht 5' 8\" (1.727 m)    Wt 215 lb (97.5 kg)    SpO2 94%    BMI 32.69 kg/m2       Vitals and Nurse Documentation reviewed. Physical Exam   HENT:   Right Ear: Decreased hearing is noted. Ears:    Cardiovascular: Normal rate, S1 normal and S2 normal.  Exam reveals no gallop and no friction rub. No murmur heard. Pulmonary/Chest: Effort normal and breath sounds normal. No respiratory distress. Pt seen and examined with NP Dudley, agree with note and plan.  Julianna Runner, MD

## 2017-11-30 NOTE — PROGRESS NOTES
Chief Complaint   Patient presents with    Ear Fullness     fullness in left ear- happened yesterday after shower- denies any pain       1. Have you been to the ER, urgent care clinic since your last visit? Hospitalized since your last visit? No    2. Have you seen or consulted any other health care providers outside of the [facility] since your last visit? Include any pap smears or colon screening.  Yes- Dr. Isra Sánchez- dermatologist-  Last [phone]

## 2017-11-30 NOTE — MR AVS SNAPSHOT
Visit Information Date & Time Provider Department Dept. Phone Encounter #  
 11/30/2017  1:30 PM Cristin Velarde, [facility] [phone] [id number] Follow-up Instructions Return if symptoms worsen or fail to improve. Your Appointments 1/22/2018 10:00 AM  
COMPLETE PHYSICAL with Carmencita Quan MD  
[facility]) Appt Note: annual physical  
 [phone]  
[phone]  
  
   
 [address] [phone] Upcoming Health Maintenance Date Due Pneumococcal 65+ High/Highest Risk (2 of 2 - PPSV23) 3/15/2017 MEDICARE YEARLY EXAM 1/19/2018 GLAUCOMA SCREENING Q2Y 7/6/2018 DTaP/Tdap/Td series (2 - Td) 8/19/2019 Allergies as of 11/30/2017  Review Complete On: 11/30/2017 By: Cristin Velarde MD  
  
 Severity Noted Reaction Type Reactions Pcn [Penicillins]  04/01/2010    Rash Current Immunizations  Reviewed on 4/3/2017 Name Date Influenza High Dose Vaccine PF 9/22/2017 12:00 AM, 9/8/2016 Influenza Vaccine 8/20/2015 Influenza Vaccine Whole 9/2/2012 Pneumococcal Conjugate (PCV-13) 1/18/2017 Pneumococcal Vaccine (Pcv) 8/19/2009 TDAP Vaccine 8/19/2009 Zoster 5/1/2007 Not reviewed this visit You Were Diagnosed With   
  
 Codes Comments Conductive hearing loss of left ear, unspecified hearing status on contralateral side    -  Primary ICD-10-CM: H90.12 ICD-9-CM: 389.05 Left ear impacted cerumen     ICD-10-CM: H61.22 
ICD-9-CM: 380.4 Vitals BP Pulse Temp Resp Height(growth percentile) Weight(growth percentile) 149/60 (BP 1 Location: Left arm, BP Patient Position: Sitting) 66 98.2 °F (36.8 °C) (Oral) 15 5' 8\" (1.727 m) 215 lb (97.5 kg) SpO2 BMI Smoking Status 94% 32.69 kg/m2 Never Smoker Vitals History BMI and BSA Data Body Mass Index Body Surface Area 32.69 kg/m 2 2.16 m 2 Preferred Pharmacy Pharmacy Name [address] [phone] Your Updated Medication List  
  
   
This list is accurate as of: 11/30/17  2:08 PM.  Always use your most recent med list.  
  
  
  
  
 Dell Sero Take  by mouth two (2) times a day. levothyroxine 150 mcg tablet Commonly known as:  SYNTHROID  
TAKE 1 TABLET BY MOUTH BEFORE BREAKFAST * TAMBOCOR 50 mg tablet Generic drug:  flecainide Take  by mouth two (2) times a day. * flecainide 100 mg tablet Commonly known as:  TAMBOCOR  
  
 VITAMIN C PO Take  by mouth daily. warfarin 5 mg tablet Commonly known as:  COUMADIN Take 7.5 mg by mouth daily. * Notice: This list has 2 medication(s) that are the same as other medications prescribed for you. Read the directions carefully, and ask your doctor or other care provider to review them with you. Follow-up Instructions Return if symptoms worsen or fail to improve. Patient Instructions Earwax Blockage: Care Instructions Your Care Instructions Earwax is a natural substance that protects the ear canal. Normally, earwax drains from the ears and does not cause problems. Sometimes earwax builds up and hardens. Earwax blockage (also called cerumen impaction) can cause some loss of hearing and pain. When wax is tightly packed, you will need to have your doctor remove it. Follow-up care is a key part of your treatment and safety. Be sure to make and go to all appointments, and call your doctor if you are having problems. It's also a good idea to know your test results and keep a list of the medicines you take. How can you care for yourself at home? · Do not try to remove earwax with cotton swabs, fingers, or other objects. This can make the blockage worse and damage the eardrum. · If your doctor recommends that you try to remove earwax at home: ¨ Soften and loosen the earwax with warm mineral oil. You also can try hydrogen peroxide mixed with an equal amount of room temperature water. Place 2 drops of the fluid, warmed to body temperature, in the ear two times a day for up to 5 days. ¨ Once the wax is loose and soft, all that is usually needed to remove it from the ear canal is a gentle, warm shower. Direct the water into the ear, then tip your head to let the earwax drain out. Dry your ear thoroughly with a hair dryer set on low. Hold the dryer several inches from your ear. ¨ If the warm mineral oil and shower do not work, use an over-the-counter wax softener followed by gentle flushing with an ear syringe each night for a week or two. Make sure the flushing solution is body temperature. Cool or hot fluids in the ear can cause dizziness. When should you call for help? Call your doctor now or seek immediate medical care if: 
? · Pus or blood drains from your ear. ? · Your ears are ringing or feel full. ? · You have a loss of hearing. ? Watch closely for changes in your health, and be sure to contact your doctor if: 
? · You have pain or reduced hearing after 1 week of home treatment. ? · You have any new symptoms, such as nausea or balance problems. Where can you learn more? Go to http://jas-marielle.info/. Enter M346 in the search box to learn more about \"Earwax Blockage: Care Instructions. \" Current as of: March 20, 2017 Content Version: 11.4 © 8494-7959 [employer]. Care instructions adapted under license by [employer] (which disclaims liability or warranty for this information). If you have questions about a medical condition or this instruction, always ask your healthcare professional. [place] [address] any warranty or liability for your use of this information. Introducing [facility]! Rajwinder Goins introduces [employer] patient portal. Now you can access parts of your medical record, email your doctor's office, and request medication refills online. 1. In your internet browser, go to [email or web address] 2. Click on the First Time User? Click Here link in the Sign In box. You will see the New Member Sign Up page. 3. Enter your [employer] Access Code exactly as it appears below. You will not need to use this code after youve completed the sign-up process. If you do not sign up before the expiration date, you must request a new code. · [employer] Access Code: [email or web address] Expires: 2/28/2018  2:08 PM 
 
4. Enter the last four digits of your Social Security Number (xxxx) and Date of Birth (mm/dd/yyyy) as indicated and click Submit. You will be taken to the next sign-up page. 5. Create a [employer] ID. This will be your [employer] login ID and cannot be changed, so think of one that is secure and easy to remember. 6. Create a [employer] password. You can change your password at any time. 7. Enter your Password Reset Question and Answer. This can be used at a later time if you forget your password. 8. Enter your e-mail address. You will receive e-mail notification when new information is available in [address] 9. Click Sign Up. You can now view and download portions of your medical record. 10. Click the Download Summary menu link to download a portable copy of your medical information. If you have questions, please visit the Frequently Asked Questions section of the [employer] website. Remember, [employer] is NOT to be used for urgent needs. For medical emergencies, dial 911. Now available from your iPhone and Android! Please provide this summary of care documentation to your next provider. Your primary care clinician is listed as Annie Nur. If you have any questions after today's visit, please call [phone].

## 2017-11-30 NOTE — PATIENT INSTRUCTIONS
Earwax Blockage: Care Instructions  Your Care Instructions    Earwax is a natural substance that protects the ear canal. Normally, earwax drains from the ears and does not cause problems. Sometimes earwax builds up and hardens. Earwax blockage (also called cerumen impaction) can cause some loss of hearing and pain. When wax is tightly packed, you will need to have your doctor remove it. Follow-up care is a key part of your treatment and safety. Be sure to make and go to all appointments, and call your doctor if you are having problems. It's also a good idea to know your test results and keep a list of the medicines you take. How can you care for yourself at home? · Do not try to remove earwax with cotton swabs, fingers, or other objects. This can make the blockage worse and damage the eardrum. · If your doctor recommends that you try to remove earwax at home:  ¨ Soften and loosen the earwax with warm mineral oil. You also can try hydrogen peroxide mixed with an equal amount of room temperature water. Place 2 drops of the fluid, warmed to body temperature, in the ear two times a day for up to 5 days. ¨ Once the wax is loose and soft, all that is usually needed to remove it from the ear canal is a gentle, warm shower. Direct the water into the ear, then tip your head to let the earwax drain out. Dry your ear thoroughly with a hair dryer set on low. Hold the dryer several inches from your ear. ¨ If the warm mineral oil and shower do not work, use an over-the-counter wax softener followed by gentle flushing with an ear syringe each night for a week or two. Make sure the flushing solution is body temperature. Cool or hot fluids in the ear can cause dizziness. When should you call for help? Call your doctor now or seek immediate medical care if:  ? · Pus or blood drains from your ear. ? · Your ears are ringing or feel full. ? · You have a loss of hearing. ? Watch closely for changes in your health, and be sure to contact your doctor if:  ? · You have pain or reduced hearing after 1 week of home treatment. ? · You have any new symptoms, such as nausea or balance problems. Where can you learn more? Go to http://jas-marielle.info/. Enter J811 in the search box to learn more about \"Earwax Blockage: Care Instructions. \"  Current as of: March 20, 2017  Content Version: 11.4  © 7781-3064 [employer]. Care instructions adapted under license by [employer] (which disclaims liability or warranty for this information). If you have questions about a medical condition or this instruction, always ask your healthcare professional. [place] [address] any warranty or liability for your use of this information.

## 2018-01-22 ENCOUNTER — HOSPITAL ENCOUNTER (OUTPATIENT)
Dept: LAB | Age: 78
Discharge: HOME | End: 2018-01-22
Payer: MEDICARE

## 2018-01-22 ENCOUNTER — OFFICE VISIT (OUTPATIENT)
Dept: FAMILY MEDICINE CLINIC | Age: 78
End: 2018-01-22

## 2018-01-22 VITALS
WEIGHT: 211.8 LBS | RESPIRATION RATE: 18 BRPM | SYSTOLIC BLOOD PRESSURE: 108 MMHG | BODY MASS INDEX: 31.37 KG/M2 | HEART RATE: 57 BPM | TEMPERATURE: 98 F | HEIGHT: 69 IN | DIASTOLIC BLOOD PRESSURE: 52 MMHG | OXYGEN SATURATION: 96 %

## 2018-01-22 DIAGNOSIS — G25.0 ESSENTIAL TREMOR: ICD-10-CM

## 2018-01-22 DIAGNOSIS — L85.3 DRY SKIN: ICD-10-CM

## 2018-01-22 DIAGNOSIS — E78.5 HYPERLIPIDEMIA, UNSPECIFIED HYPERLIPIDEMIA TYPE: ICD-10-CM

## 2018-01-22 DIAGNOSIS — E03.9 ACQUIRED HYPOTHYROIDISM: ICD-10-CM

## 2018-01-22 DIAGNOSIS — I48.91 ATRIAL FIBRILLATION, UNSPECIFIED TYPE (HCC): ICD-10-CM

## 2018-01-22 DIAGNOSIS — R73.02 GLUCOSE INTOLERANCE (IMPAIRED GLUCOSE TOLERANCE): ICD-10-CM

## 2018-01-22 DIAGNOSIS — Z00.00 ENCOUNTER FOR MEDICARE ANNUAL WELLNESS EXAM: Primary | ICD-10-CM

## 2018-01-22 DIAGNOSIS — Z85.46 HISTORY OF PROSTATE CANCER: ICD-10-CM

## 2018-01-22 PROCEDURE — 80061 LIPID PANEL: CPT

## 2018-01-22 PROCEDURE — 83036 HEMOGLOBIN GLYCOSYLATED A1C: CPT

## 2018-01-22 PROCEDURE — 84443 ASSAY THYROID STIM HORMONE: CPT

## 2018-01-22 PROCEDURE — 84153 ASSAY OF PSA TOTAL: CPT

## 2018-01-22 PROCEDURE — 80053 COMPREHEN METABOLIC PANEL: CPT

## 2018-01-22 NOTE — PROGRESS NOTES
Harjit Tamayo is a 76 y.o. male and presents for annual Medicare Wellness Visit. Problem List: Reviewed with patient and discussed risk factors.     Patient Active Problem List   Diagnosis Code    Hearing loss H91.90    History of prostate cancer Z85.46    Hypothyroidism E03.9    HTN, goal below 140/90 I10    Hematuria, microscopic R31.29    Rectus diastasis M62.08    ED (erectile dysfunction) N52.9    Tear of meniscus of right knee S83.206A    Left inguinal hernia K40.90    Seborrheic dermatitis, unspecified L21.9    Keratoacanthoma L85.8    Degenerative arthritis of left knee M17.12    Degenerative arthritis of hip M16.9    Unspecified adverse effect of anesthesia T41.45XA    Atrial fibrillation (HCC) I48.91    On warfarin therapy Z79.01    Aortic insufficiency I35.1       Current medical providers:  Patient Care Team:  Edilma Sanchez MD as PCP - General (Family Practice)  Casey Hunter MD (Cardiology)  Jason Hairston MD (Dermatology)    PSH: Reviewed with patient  Past Surgical History:   Procedure Laterality Date    ABDOMEN SURGERY PROC UNLISTED  4/12    hernia-    ENDOSCOPY, COLON, DIAGNOSTIC  11/19/07    w/polypectomy    HX GI      COLONOSCOPY    HX KNEE ARTHROSCOPY  7/08    MENISCECTOMY LEFT    HX KNEE ARTHROSCOPY      LEFT AND RIGHT ARTHROSCOPY    HX RADICAL PROSTATECTOMY  1994    HX TONSILLECTOMY          SH: Reviewed with patient  Social History   Substance Use Topics    Smoking status: Never Smoker    Smokeless tobacco: Never Used    Alcohol use 3.0 oz/week     2 Glasses of wine, 4 Standard drinks or equivalent per week      Comment: moderate       FH: Reviewed with patient  Family History   Problem Relation Age of Onset   Mukul Sherburn Stroke Mother     Heart Disease Father      CHF    Diabetes Father     Cancer Sister      breast    Thyroid Disease Sister     Diabetes Daughter 9     insulin-dependent       Medications/Allergies: Reviewed with patient  Current Outpatient Prescriptions on File Prior to Visit   Medication Sig Dispense Refill    flecainide (TAMBOCOR) 100 mg tablet Take 100 mg by mouth two (2) times a day.  levothyroxine (SYNTHROID) 150 mcg tablet TAKE 1 TABLET BY MOUTH BEFORE BREAKFAST 90 Tab 1    warfarin (COUMADIN) 5 mg tablet Take 7.5 mg by mouth daily.  ASCORBIC ACID (VITAMIN C PO) Take  by mouth daily.  CALCIUM POLYCARBOPHIL (FIBERCON PO) Take  by mouth two (2) times a day. No current facility-administered medications on file prior to visit. Allergies   Allergen Reactions    Pcn [Penicillins] Rash       Objective:  Visit Vitals    /52 (BP 1 Location: Left arm, BP Patient Position: Sitting)    Pulse (!) 57    Temp 98 °F (36.7 °C) (Oral)    Resp 18    Ht 5' 8.5\" (1.74 m)    Wt 211 lb 12.8 oz (96.1 kg)    SpO2 96%    BMI 31.74 kg/m2    Body mass index is 31.74 kg/(m^2). Assessment of cognitive impairment: Alert and oriented x 3    Depression Screen:   PHQ over the last two weeks 4/3/2017   Little interest or pleasure in doing things Not at all   Feeling down, depressed or hopeless Not at all   Total Score PHQ 2 0       Fall Risk Assessment:    Fall Risk Assessment, last 12 mths 4/3/2017   Able to walk? Yes   Fall in past 12 months? Yes   Fall with injury? No   Number of falls in past 12 months 2   Fall Risk Score 2       Functional Ability:   Does the patient exhibit a steady gait? yes   How long did it take the patient to get up and walk from a sitting position? 3 secs   Is the patient self reliant?  (ie can do own laundry, meals, household chores)  yes     Does the patient handle his/her own medications? yes     Does the patient handle his/her own money? yes     Is the patients home safe (ie good lighting, handrails on stairs and bath, etc.)? yes     Did you notice or did patient express any hearing difficulties? no     Did you notice or did patient express any vision difficulties? no     Were distance and reading eye charts used? no       Advance Care Planning:   Patient was offered the opportunity to discuss advance care planning:  yes     Does patient have an Advance Directive:  yes   If no, did you provide information on Caring Connections? In chart       Plan:      Orders Placed This Encounter    HEMOGLOBIN A1C WITH EAG    METABOLIC PANEL, COMPREHENSIVE    LIPID PANEL    PROSTATE SPECIFIC AG    TSH AND FREE T4       Health Maintenance   Topic Date Due    Pneumococcal 65+ High/Highest Risk (2 of 2 - PPSV23) 03/15/2017    MEDICARE YEARLY EXAM  01/19/2018    GLAUCOMA SCREENING Q2Y  07/06/2018    DTaP/Tdap/Td series (2 - Td) 08/19/2019    ZOSTER VACCINE AGE 60>  Completed    Influenza Age 5 to Adult  Completed       *Patient verbalized understanding and agreement with the plan. A copy of the After Visit Summary with personalized health plan was given to the patient today.

## 2018-01-22 NOTE — MR AVS SNAPSHOT
[address] [place] [address].[address] 
[phone] Patient: Meir Jj MRN: [id number] XDN:[birth year] Visit Information Date & Time Provider Department Dept. Phone Encounter #  
 1/22/2018 10:00 AM Ofelia Reddy  [facility] [phone] [id number] Follow-up Instructions Return in about 6 months (around 7/22/2018) for Medication Check. Upcoming Health Maintenance Date Due Pneumococcal 65+ High/Highest Risk (2 of 2 - PPSV23) 3/15/2017 MEDICARE YEARLY EXAM 1/19/2018 GLAUCOMA SCREENING Q2Y 7/6/2018 DTaP/Tdap/Td series (2 - Td) 8/19/2019 Allergies as of 1/22/2018  Review Complete On: 1/22/2018 By: Larry Johnson Severity Noted Reaction Type Reactions Pcn [Penicillins]  04/01/2010    Rash Current Immunizations  Reviewed on 4/3/2017 Name Date Influenza High Dose Vaccine PF 9/22/2017 12:00 AM, 9/8/2016 Influenza Vaccine 8/20/2015 Influenza Vaccine Whole 9/2/2012 Pneumococcal Conjugate (PCV-13) 1/18/2017 Pneumococcal Vaccine (Pcv) 8/19/2009 TDAP Vaccine 8/19/2009 Zoster 5/1/2007 Not reviewed this visit You Were Diagnosed With   
  
 Codes Comments Encounter for Medicare annual wellness exam    -  Primary ICD-10-CM: Z00.00 ICD-9-CM: V70.0 Acquired hypothyroidism     ICD-10-CM: E03.9 ICD-9-CM: 244.9 Prostate CA [facility])     ICD-10-CM: I88 ICD-9-CM: 573 Glucose intolerance (impaired glucose tolerance)     ICD-10-CM: R73.02 
ICD-9-CM: 790.22 Hyperlipidemia, unspecified hyperlipidemia type     ICD-10-CM: E78.5 ICD-9-CM: 272.4 Vitals BP Pulse Temp Resp Height(growth percentile) Weight(growth percentile) 108/52 (BP 1 Location: Left arm, BP Patient Position: Sitting) (!) 57 98 °F (36.7 °C) (Oral) 18 5' 8.5\" (1.74 m) 211 lb 12.8 oz (96.1 kg) SpO2 BMI Smoking Status 96% 31.74 kg/m2 Never Smoker BMI and BSA Data Body Mass Index Body Surface Area 31.74 kg/m 2 2.16 m 2 Preferred Pharmacy Pharmacy Name Phone [address] [facility] [phone] Your Updated Medication List  
  
   
This list is accurate as of: 1/22/18 10:45 AM.  Always use your most recent med list.  
  
  
  
  
 Nicolette  Take  by mouth two (2) times a day. flecainide 100 mg tablet Commonly known as:  TAMBOCOR Take 100 mg by mouth two (2) times a day. levothyroxine 150 mcg tablet Commonly known as:  SYNTHROID  
TAKE 1 TABLET BY MOUTH BEFORE BREAKFAST  
  
 VITAMIN C PO Take  by mouth daily. warfarin 5 mg tablet Commonly known as:  COUMADIN Take 7.5 mg by mouth daily. We Performed the Following HEMOGLOBIN A1C WITH EAG [99272 CPT(R)] LIPID PANEL [29541 CPT(R)] METABOLIC PANEL, COMPREHENSIVE [30404 CPT(R)] PSA, DIAGNOSTIC (PROSTATE SPECIFIC AG) [id number] CPT(R)] TSH AND FREE T4 [17503 CPT(R)] Follow-up Instructions Return in about 6 months (around 7/22/2018) for Medication Check. Patient Instructions Try CETAPHIL or CERAVE lotion twice a day. Schedule of Personalized Health Plan (Provide Copy to Patient) The best way to stay healthy is to live a healthy lifestyle. A healthy lifestyle includes regular exercise, eating a well-balanced diet, keeping a healthy weight and not smoking. Regular physical exams and screening tests are another important way to take care of yourself. Preventive exams provided by health care providers can find health problems early when treatment works best and can keep you from getting certain diseases or illnesses. Preventive services include exams, lab tests, screenings, shots, monitoring and information to help you take care of your own health. All people over 65 should have a pneumonia shot.  Pneumonia shots are usually only needed once in a lifetime unless your doctor decides differently. All people over 65 should have a yearly flu shot. People over 65 are at medium to high risk for Hepatitis B. Three shots are needed for complete protection. In addition to your physical exam, some screening tests are recommended: 
 
Bone mass measurement (dexa scan) is recommended every two years if you have certain risk factors, such as personal history of vertebral fracture or chronic steroid medication use Diabetes Mellitus screening is recommended every year. Glaucoma is an eye disease caused by high pressure in the eye. An eye exam is recommended every year. Cardiovascular screening tests that check your cholesterol and other blood fat (lipid) levels are recommended every five years. Colorectal Cancer screening tests help to find pre-cancerous polyps (growths in the colon) so they can be removed before they turn into cancer. Tests ordered for screening depend on your personal and family history risk factors. Screening for Prostate Cancer is recommended yearly with a digital rectal exam and/or a PSA test 
 
Here is a list of your current Health Maintenance items with a due date: 
Health Maintenance Topic Date Due  Pneumococcal 65+ High/Highest Risk (2 of 2 - PPSV23) 03/15/2017  MEDICARE YEARLY EXAM  01/19/2018  GLAUCOMA SCREENING Q2Y  07/06/2018  DTaP/Tdap/Td series (2 - Td) 08/19/2019  ZOSTER VACCINE AGE 60>  Completed  Influenza Age 5 to Adult  Completed Introducing [facility]! Colleen Flood introduces [employer] patient portal. Now you can access parts of your medical record, email your doctor's office, and request medication refills online. 1. In your internet browser, go to [email or web address] 2. Click on the First Time User? Click Here link in the Sign In box. You will see the New Member Sign Up page. 3. Enter your [facility] [employer] Access Code exactly as it appears below. You will not need to use this code after youve completed the sign-up process. If you do not sign up before the expiration date, you must request a new code. · [employer] Access Code: [email or web address] Expires: 2/28/2018  2:08 PM 
 
4. Enter the last four digits of your Social Security Number (xxxx) and Date of Birth (mm/dd/yyyy) as indicated and click Submit. You will be taken to the next sign-up page. 5. Create a [employer] ID. This will be your [facility] [facility] login ID and cannot be changed, so think of one that is secure and easy to remember. 6. Create a [employer] [facility] password. You can change your password at any time. 7. Enter your Password Reset Question and Answer. This can be used at a later time if you forget your password. 8. Enter your e-mail address. You will receive e-mail notification when new information is available in [address]. 9. Click Sign Up. You can now view and download portions of your medical record. 10. Click the Download Summary menu link to download a portable copy of your medical information. If you have questions, please visit the Frequently Asked Questions section of the [employer] website. Remember, [employer] [facility] is NOT to be used for urgent needs. For medical emergencies, dial 911. Now available from your iPhone and Android! Please provide this summary of care documentation to your next provider. Your primary care clinician is listed as Annie Nur. If you have any questions after today's visit, please call [phone].

## 2018-01-22 NOTE — ACP (ADVANCE CARE PLANNING)
Advance Care Planning:   Patient was offered the opportunity to discuss advance care planning:  yes     Does patient have an Advance Directive:  yes   If no, did you provide information on [facility]?   In chart

## 2018-01-22 NOTE — PATIENT INSTRUCTIONS
Try CETAPHIL or CERAVE lotion twice a day. Schedule of Personalized Health Plan  (Provide Copy to Patient)  The best way to stay healthy is to live a healthy lifestyle. A healthy lifestyle includes regular exercise, eating a well-balanced diet, keeping a healthy weight and not smoking. Regular physical exams and screening tests are another important way to take care of yourself. Preventive exams provided by health care providers can find health problems early when treatment works best and can keep you from getting certain diseases or illnesses. Preventive services include exams, lab tests, screenings, shots, monitoring and information to help you take care of your own health. All people over 65 should have a pneumonia shot. Pneumonia shots are usually only needed once in a lifetime unless your doctor decides differently. All people over 65 should have a yearly flu shot. People over 65 are at medium to high risk for Hepatitis B. Three shots are needed for complete protection. In addition to your physical exam, some screening tests are recommended:    Bone mass measurement (dexa scan) is recommended every two years if you have certain risk factors, such as personal history of vertebral fracture or chronic steroid medication use    Diabetes Mellitus screening is recommended every year. Glaucoma is an eye disease caused by high pressure in the eye. An eye exam is recommended every year. Cardiovascular screening tests that check your cholesterol and other blood fat (lipid) levels are recommended every five years. Colorectal Cancer screening tests help to find pre-cancerous polyps (growths in the colon) so they can be removed before they turn into cancer. Tests ordered for screening depend on your personal and family history risk factors.     Screening for Prostate Cancer is recommended yearly with a digital rectal exam and/or a PSA test    Here is a list of your current Health Maintenance items with a due date:  Health Maintenance   Topic Date Due    Pneumococcal 65+ High/Highest Risk (2 of 2 - PPSV23) 03/15/2017    MEDICARE YEARLY EXAM  01/19/2018    GLAUCOMA SCREENING Q2Y  07/06/2018    DTaP/Tdap/Td series (2 - Td) 08/19/2019    ZOSTER VACCINE AGE 60>  Completed    Influenza Age 5 to Adult  Completed

## 2018-01-22 NOTE — ASSESSMENT & PLAN NOTE
This condition is managed by Specialist.  Key CAD CHF Meds             flecainide (TAMBOCOR) 100 mg tablet  (Taking) Take 100 mg by mouth two (2) times a day. warfarin (COUMADIN) 5 mg tablet  (Taking) Take 7.5 mg by mouth daily. Key Antihyperlipidemia Meds     The patient is on no antihyperlipidemia meds.         No results found for: BNP, BNPP, BNPPPOC, HSCRP, NA, NAPOC, K, KPOCT, CHOL, CHOLPOCT, HDL, HDLPOC, LDLCPOC, LDLC, LDL, LDLCEXT, TRIGL, TGLPOCT, INR, PTP, PTINR, PTEXT, DIG, PTEXT

## 2018-01-22 NOTE — PROGRESS NOTES
Patient Name: Maggie Wilkerson   MRN: [id number]    Ivonne Casillas is a 76 y.o. male who presents with the following: The patient has hyperlipidemia, Atrial Fibrillation and pre-DM. He reports taking medications as instructed, no medication side effects noted, no TIA's, no chest pain on exertion, no dyspnea on exertion, no swelling of ankles, no orthostatic dizziness or lightheadedness. Diet and Lifestyle: generally follows a low fat low cholesterol diet, generally follows a low sodium diet, exercises sporadically. Lab review: orders written for new lab studies as appropriate; see orders. History of hypothyroidism, currently on levothyroxine. Due for recheck. Remote history of prostate cancer; patient would like to recheck PSA. Patient has reports several year history of a mild tremor in both of his hands. Reports it is most noticeable when he reaches for something like pouring a glass of water. Denies any tremor at rest, family history of dementia or movement disorders, or any recent memory changes. Also has some dry itchy skin along his lower abdomen. Has been using equate lotion for moisturization for the past 2 days. Review of Systems   Constitutional: Negative for fever, malaise/fatigue and weight loss. Respiratory: Negative for cough, hemoptysis, shortness of breath and wheezing. Cardiovascular: Negative for chest pain, palpitations, leg swelling and PND. Gastrointestinal: Negative for abdominal pain, constipation, diarrhea, nausea and vomiting. Skin: Positive for itching. Neurological: Positive for tremors. Negative for dizziness, tingling, sensory change, speech change, focal weakness, seizures, loss of consciousness and headaches. The patient's medications, allergies, past medical history, surgical history, family history and social history were reviewed and updated where appropriate.       Prior to Admission medications    Medication Sig Start Date End Date Taking? Authorizing Provider   flecainide (TAMBOCOR) 100 mg tablet Take 100 mg by mouth two (2) times a day. 10/31/17  Yes Historical Provider   levothyroxine (SYNTHROID) 150 mcg tablet TAKE 1 TABLET BY MOUTH BEFORE BREAKFAST 11/2/17  Yes Edilma Sanchez MD   warfarin (COUMADIN) 5 mg tablet Take 7.5 mg by mouth daily. 11/3/14  Yes Historical Provider   ASCORBIC ACID (VITAMIN C PO) Take  by mouth daily. Yes Historical Provider   CALCIUM POLYCARBOPHIL (FIBERCON PO) Take  by mouth two (2) times a day. Yes Historical Provider   flecainide (TAMBOCOR) 50 mg tablet Take  by mouth two (2) times a day. Historical Provider       Allergies   Allergen Reactions    Pcn [Penicillins] Rash           OBJECTIVE    Visit Vitals    /52 (BP 1 Location: Left arm, BP Patient Position: Sitting)    Pulse (!) 57    Temp 98 °F (36.7 °C) (Oral)    Resp 18    Ht 5' 8.5\" (1.74 m)    Wt 211 lb 12.8 oz (96.1 kg)    SpO2 96%    BMI 31.74 kg/m2       Physical Exam   Constitutional: He is oriented to person, place, and time and well-developed, well-nourished, and in no distress. No distress. HENT:   Head: Normocephalic and atraumatic. Right Ear: External ear normal.   Left Ear: External ear normal.   Eyes: Conjunctivae and EOM are normal. Pupils are equal, round, and reactive to light. Cardiovascular: Normal rate, regular rhythm and normal heart sounds. Exam reveals no gallop and no friction rub. No murmur heard. Pulmonary/Chest: Effort normal and breath sounds normal. No respiratory distress. He has no wheezes. Neurological: He is alert and oriented to person, place, and time. He has intact cranial nerves. He displays tremor (very mild bilateral hand tremor upon arm extension, none at rest). He displays no weakness and facial symmetry. He has a normal Cerebellar Exam and a normal Finger-Nose-Finger Test. Gait normal. Coordination and gait normal. GCS score is 15. Skin: Skin is warm and dry.  He is not diaphoretic. Scaly diffuse dry skin along lower abdomen and lower right shin. Psychiatric: Mood, memory, affect and judgment normal.   Nursing note and vitals reviewed. ASSESSMENT AND PLAN  Michelle Reich is a 76 y.o. male who presents today for:    1. Encounter for Medicare annual wellness exam  See other note. 2. Glucose intolerance (impaired glucose tolerance)  - HEMOGLOBIN A1C WITH EAG    3. Hyperlipidemia, unspecified hyperlipidemia type  Will calculate ASCVD risk score pending labs. - METABOLIC PANEL, COMPREHENSIVE  - LIPID PANEL    4. Acquired hypothyroidism  Stable, continue current treatment pending review of labs. - TSH AND FREE T4    5. History of prostate cancer  Stable, continue current treatment pending review of labs. - PROSTATE SPECIFIC AG    6. Essential tremor  Discussed benign nature of essential tremor. Patient states that his symptoms are not bothersome at this point therefore defer any medications. If symptoms worsen or if he develops any issues with his memory, may consider neurology referral.    7. Dry skin  Reviewed OTC lotion brands to use and encourage moisturizing at least twice a day. 8. Atrial fibrillation, unspecified type ([address]  Stable, continue current treatment. Medications Discontinued During This Encounter   Medication Reason    flecainide (TAMBOCOR) 50 mg tablet Not A Current Medication       Follow-up Disposition:  Return in about 6 months (around 7/22/2018) for Medication Check. Medication risks/benefits/costs/interactions/alternatives discussed with patient. Advised patient to call back or return to office if symptoms worsen/change/persist. If patient cannot reach us or should anything more severe/urgent arise he/she should proceed directly to the nearest emergency department. Discussed expected course/resolution/complications of diagnosis in detail with patient.   Patient given a written after visit summary which includes his/her diagnoses, current medications and vitals. Patient expressed understanding with the diagnosis and plan.      Ofelia Reddy M.D.

## 2018-01-22 NOTE — LETTER
1/26/2018 11:59 AM 
 
Mr. Luma Longoria [address] [place] [id number] Dear Luma Longoria: 
 
Please find your most recent results below. Resulted Orders HEMOGLOBIN A1C WITH EAG Result Value Ref Range Hemoglobin A1c 5.8 (H) 4.8 - 5.6 % Comment:  
            Pre-diabetes: 5.7 - 6.4 Diabetes: >6.4 Glycemic control for adults with diabetes: <7.0 Estimated average glucose 120 mg/dL Narrative Performed at:  [address]  [id number] : Castillo Vasquez MD, Phone:  [phone] METABOLIC PANEL, COMPREHENSIVE Result Value Ref Range Glucose 98 65 - 99 mg/dL BUN 30 (H) 8 - 27 mg/dL Creatinine 1.31 (H) 0.76 - 1.27 mg/dL GFR est non-AA 51 (L) >59 mL/min/1.73 GFR est AA 59 (L) >59 mL/min/1.73  
 BUN/Creatinine ratio 23 10 - 24 Sodium 141 134 - 144 mmol/L Potassium 4.5 3.5 - 5.2 mmol/L Chloride 104 96 - 106 mmol/L  
 CO2 22 18 - 29 mmol/L Calcium 9.0 8.6 - 10.2 mg/dL Protein, total 6.4 6.0 - 8.5 g/dL Albumin 3.8 3.5 - 4.8 g/dL GLOBULIN, TOTAL 2.6 1.5 - 4.5 g/dL A-G Ratio 1.5 1.2 - 2.2 Bilirubin, total 0.4 0.0 - 1.2 mg/dL Alk. phosphatase 65 39 - 117 IU/L  
 AST (SGOT) 19 0 - 40 IU/L  
 ALT (SGPT) 17 0 - 44 IU/L Narrative Performed at:  [address]  [id number] : Castillo Vasquez MD, Phone:  [phone] LIPID PANEL Result Value Ref Range Cholesterol, total 190 100 - 199 mg/dL Triglyceride 73 0 - 149 mg/dL HDL Cholesterol 50 >39 mg/dL VLDL, calculated 15 5 - 40 mg/dL LDL, calculated 125 (H) 0 - 99 mg/dL Narrative Performed at:  [address] [place]  [id number] : Castillo Vasquez MD, Phone:  [phone] PSA, DIAGNOSTIC (PROSTATE SPECIFIC AG) Result Value Ref Range Prostate Specific Ag <0.1 0.0 - 4.0 ng/mL Comment: Roche ECLIA methodology. According to the American Urological Association, Serum PSA should 
decrease and remain at undetectable levels after radical 
prostatectomy. The AUA defines biochemical recurrence as an initial 
PSA value 0.2 ng/mL or greater followed by a subsequent confirmatory PSA value 0.2 ng/mL or greater. Values obtained with different assay methods or kits cannot be used 
interchangeably. Results cannot be interpreted as absolute evidence 
of the presence or absence of malignant disease. Narrative Performed at:  [address]  [id number] : Pierre Sim MD, Phone:  [phone] TSH AND FREE T4 Result Value Ref Range TSH 3.560 0.450 - 4.500 uIU/mL T4, Free 1.57 0.82 - 1.77 ng/dL Narrative Performed at:  [address]  [id number] : Pierre Sim MD, Phone:  [phone] CVD REPORT Result Value Ref Range INTERPRETATION Note Comment:  
   Supplemental report is available. PDF IMAGE Not applicable Narrative Performed at:  [address] [place] [address]  [id number] : Arielle Ma PhD, Phone:  [phone] CKD REPORT Result Value Ref Range Interpretation Note Comment:  
   Supplemental report is available. Narrative Performed at:  [address] [place] [address]  [id number] : Arielle Ma PhD, Phone:  [phone] RECOMMENDATIONS: 
 
Hemoglobin A1C (average blood sugar level for past 3 months) is in the pre diabetes range, which means your average sugar or glucose level is higher than normal. I would encourage healthy diets and regular exercise with the goal of maintaining a healthy weight before starting medications for this.  
 
Kidney function is stable but showing some signs of decline over the years. Would try a low salt diet and avoid any NSAIDs (Advil, Aleve). High LDL cholesterol; I would encourage a healthy diet and regular exercise with the goal of maintaining a healthy weight before starting medications for this. PSA within normal limits. TSH at goal, continue current levothyroxine dose. Please call me if you have any questions: [phone] Sincerely, Governor MD Beverley

## 2018-01-22 NOTE — PROGRESS NOTES
Chief Complaint   Patient presents with   [address] [facility] Annual Wellness Visit         Tremors     hands, abdomen has been itchy     1. Have you been to the ER, urgent care clinic since your last visit? Hospitalized since your last visit? No    2. Have you seen or consulted any other health care providers outside of the [address] [place] [address] since your last visit? Include any pap smears or colon screening.  No

## 2018-01-23 LAB
ALBUMIN SERPL-MCNC: 3.8 G/DL (ref 3.5–4.8)
ALBUMIN/GLOB SERPL: 1.5 {RATIO} (ref 1.2–2.2)
ALP SERPL-CCNC: 65 IU/L (ref 39–117)
ALT SERPL-CCNC: 17 IU/L (ref 0–44)
AST SERPL-CCNC: 19 IU/L (ref 0–40)
BILIRUB SERPL-MCNC: 0.4 MG/DL (ref 0–1.2)
BUN SERPL-MCNC: 30 MG/DL (ref 8–27)
BUN/CREAT SERPL: 23 (ref 10–24)
CALCIUM SERPL-MCNC: 9 MG/DL (ref 8.6–10.2)
CHLORIDE SERPL-SCNC: 104 MMOL/L (ref 96–106)
CHOLEST SERPL-MCNC: 190 MG/DL (ref 100–199)
CO2 SERPL-SCNC: 22 MMOL/L (ref 18–29)
CREAT SERPL-MCNC: 1.31 MG/DL (ref 0.76–1.27)
EST. AVERAGE GLUCOSE BLD GHB EST-MCNC: 120 MG/DL
GLOBULIN SER CALC-MCNC: 2.6 G/DL (ref 1.5–4.5)
GLUCOSE SERPL-MCNC: 98 MG/DL (ref 65–99)
HBA1C MFR BLD: 5.8 % (ref 4.8–5.6)
HDLC SERPL-MCNC: 50 MG/DL
INTERPRETATION, 910389: NORMAL
INTERPRETATION: NORMAL
LDLC SERPL CALC-MCNC: 125 MG/DL (ref 0–99)
PDF IMAGE, 910387: NORMAL
POTASSIUM SERPL-SCNC: 4.5 MMOL/L (ref 3.5–5.2)
PROT SERPL-MCNC: 6.4 G/DL (ref 6–8.5)
PSA SERPL-MCNC: <0.1 NG/ML (ref 0–4)
SODIUM SERPL-SCNC: 141 MMOL/L (ref 134–144)
T4 FREE SERPL-MCNC: 1.57 NG/DL (ref 0.82–1.77)
TRIGL SERPL-MCNC: 73 MG/DL (ref 0–149)
TSH SERPL DL<=0.005 MIU/L-ACNC: 3.56 UIU/ML (ref 0.45–4.5)
VLDLC SERPL CALC-MCNC: 15 MG/DL (ref 5–40)

## 2018-01-23 NOTE — PROGRESS NOTES
Please notify patient regarding their test results:    Hemoglobin A1C (average blood sugar level for past 3 months) is in the pre diabetes range, which means your average sugar or glucose level is higher than normal. I would encourage healthy diets and regular exercise with the goal of maintaining a healthy weight before starting medications for this. Kidney function is stable but showing some signs of decline over the years. Would try a low salt diet and avoid any NSAIDs (Advil, Aleve). High LDL cholesterol; I would encourage a healthy diet and regular exercise with the goal of maintaining a healthy weight before starting medications for this. PSA wnl. TSH at goal, continue current levothyroxine dose.

## 2018-01-26 NOTE — PROGRESS NOTES
Call to patient.  verified. Informed patient of results and recommendations. He states he did not follow everything I said. He would like a copy in the mail.

## 2018-07-30 ENCOUNTER — OFFICE VISIT (OUTPATIENT)
Dept: FAMILY MEDICINE CLINIC | Age: 78
End: 2018-07-30

## 2018-07-30 VITALS
DIASTOLIC BLOOD PRESSURE: 58 MMHG | BODY MASS INDEX: 31.22 KG/M2 | WEIGHT: 210.8 LBS | OXYGEN SATURATION: 97 % | HEART RATE: 57 BPM | TEMPERATURE: 98.3 F | RESPIRATION RATE: 18 BRPM | SYSTOLIC BLOOD PRESSURE: 130 MMHG | HEIGHT: 69 IN

## 2018-07-30 DIAGNOSIS — H91.93 BILATERAL HEARING LOSS, UNSPECIFIED HEARING LOSS TYPE: ICD-10-CM

## 2018-07-30 DIAGNOSIS — E03.9 ACQUIRED HYPOTHYROIDISM: ICD-10-CM

## 2018-07-30 DIAGNOSIS — G25.0 ESSENTIAL TREMOR: ICD-10-CM

## 2018-07-30 DIAGNOSIS — Z23 ENCOUNTER FOR IMMUNIZATION: ICD-10-CM

## 2018-07-30 DIAGNOSIS — R79.89 ELEVATED SERUM CREATININE: ICD-10-CM

## 2018-07-30 DIAGNOSIS — Z79.01 ON WARFARIN THERAPY: Primary | ICD-10-CM

## 2018-07-30 DIAGNOSIS — R73.02 GLUCOSE INTOLERANCE (IMPAIRED GLUCOSE TOLERANCE): ICD-10-CM

## 2018-07-30 NOTE — PROGRESS NOTES
Chief Complaint Patient presents with  Thyroid Problem  
  follow up 1. Have you been to the ER, urgent care clinic since your last visit? Hospitalized since your last visit? No 
 
2. Have you seen or consulted any other health care providers outside of the [address] [place] [address] since your last visit? Include any pap smears or colon screening. No 
Patient saw the cardiologist last June and did an EKG, everything was fine.

## 2018-07-30 NOTE — PATIENT INSTRUCTIONS
Hypothyroidism: Care Instructions Your Care Instructions You have hypothyroidism, which means that your body is not making enough thyroid hormone. This hormone helps your body use energy. If your thyroid level is low, you may feel tired, be constipated, have an increase in your blood pressure, or have dry skin or memory problems. You may also get cold easily, even when it is warm. Women with low thyroid levels may have heavy menstrual periods. A blood test to find your thyroid-stimulating hormone (TSH) level is used to check for hypothyroidism. A high TSH level may mean that you have low thyroid. When your body is not making enough thyroid hormone, TSH levels rise in an effort to make the body produce more. The treatment for hypothyroidism is to take thyroid hormone pills. You should start to feel better in 1 to 2 weeks. But it can take several months to see changes in the TSH level. You will need regular visits with your doctor to make sure you have the right dose of medicine. Most people need treatment for the rest of their lives. You will need to see your doctor regularly to have blood tests and to make sure you are doing well. Follow-up care is a key part of your treatment and safety. Be sure to make and go to all appointments, and call your doctor if you are having problems. It's also a good idea to know your test results and keep a list of the medicines you take. How can you care for yourself at home? · Take your thyroid hormone medicine exactly as prescribed. Call your doctor if you think you are having a problem with your medicine. Most people do not have side effects if they take the right amount of medicine regularly. ¨ Take the medicine 30 minutes before breakfast, and do not take it with calcium, vitamins, or iron. ¨ Do not take extra doses of your thyroid medicine. It will not help you get better any faster, and it may cause side effects.  
¨ If you forget to take a dose, do NOT take a double dose of medicine. Take your usual dose the next day. · Tell your doctor about all prescription, herbal, or over-the-counter products you take. · Take care of yourself. Eat a healthy diet, get enough sleep, and get regular exercise. When should you call for help? Call 911 anytime you think you may need emergency care. For example, call if: 
  · You passed out (lost consciousness).  
  · You have severe trouble breathing.  
  · You have a very slow heartbeat (less than 60 beats a minute).  
  · You have a low body temperature (95°F or below).  
 Call your doctor now or seek immediate medical care if: 
  · You feel tired, sluggish, or weak.  
  · You have trouble remembering things or concentrating.  
  · You do not begin to feel better 2 weeks after starting your medicine.  
 Watch closely for changes in your health, and be sure to contact your doctor if you have any problems. Where can you learn more? Go to http://jas-marielle.info/. Enter P151 in the search box to learn more about \"Hypothyroidism: Care Instructions. \" Current as of: May 12, 2017 Content Version: 11.7 © 6835-6205 [employer]. Care instructions adapted under license by [employer] (which disclaims liability or warranty for this information). If you have questions about a medical condition or this instruction, always ask your healthcare professional. [address] any warranty or liability for your use of this information.

## 2018-07-30 NOTE — PROGRESS NOTES
Patient Name: Ramos Tariq MRN: [id number] SUBJECTIVE Ramos Tariq is a [de-identified] y.o. male who presents with the following: The patient has Atrial Fibrillation, pre-DM, and elevated Cr. He reports taking medications as instructed, no medication side effects noted, no TIA's, no chest pain on exertion, no dyspnea on exertion, no swelling of ankles, no orthostatic dizziness or lightheadedness. Diet and Lifestyle: generally follows a low fat low cholesterol diet, generally follows a low sodium diet, exercises regularly. Lab review: orders written for new lab studies as appropriate; see orders. Patient denies history of CKD or frequent use of NSAIDs. Currently on levothyroxine for hypothyroidism. States that he has had a chronic issue with bilateral hearing loss, worse in the right than left, as well as occasional vertigo. He states he previously saw a ear doctor many years ago who stated that he had damage in the right ear and took methotrexate for 6 months which improved his hearing at the time. Ruled out tumor with MRI. Afterwards, he did not see any benefit therefore stopped taking the medication. Feels that typical hearing aids do not work for him. Occasionally will have an sense of imbalance when working the yard. Continues to have a tremor only when holding objects, none at rest.  No concerns regarding memory. He does not want to take any medications for the tremor. Review of Systems Constitutional: Negative for fever, malaise/fatigue and weight loss. HENT: Positive for hearing loss. Negative for tinnitus. Respiratory: Negative for cough, hemoptysis, shortness of breath and wheezing. Cardiovascular: Negative for chest pain, palpitations, leg swelling and PND. Gastrointestinal: Negative for abdominal pain, constipation, diarrhea, nausea and vomiting. Neurological: Positive for dizziness and tremors.   
 
 
The patient's medications, allergies, past medical history, surgical history, family history and social history were reviewed and updated where appropriate. Prior to Admission medications Medication Sig Start Date End Date Taking? Authorizing Provider  
levothyroxine (SYNTHROID) 150 mcg tablet TAKE 1 TABLET BY MOUTH BEFORE BREAKFAST 5/4/18  Yes Kassandra Portillo MD  
flecainide (TAMBOCOR) 100 mg tablet Take 100 mg by mouth two (2) times a day. 10/31/17  Yes Historical Provider  
warfarin (COUMADIN) 5 mg tablet Take 7.5 mg by mouth daily. Patient reports taking 5 mg Tuesdays and Fridays and 7.5 mg the other days. 11/3/14  Yes Historical Provider ASCORBIC ACID (VITAMIN C PO) Take  by mouth daily. Yes Historical Provider CALCIUM POLYCARBOPHIL (FIBERCON PO) Take  by mouth two (2) times a day. Yes Historical Provider Allergies Allergen Reactions  Pcn [Penicillins] Rash OBJECTIVE Visit Vitals  /58 (BP 1 Location: Right arm, BP Patient Position: Sitting)  Pulse (!) 57  Temp 98.3 °F (36.8 °C) (Oral)  Resp 18  Ht 5' 8.5\" (1.74 m)  Wt 210 lb 12.8 oz (95.6 kg)  SpO2 97%  BMI 31.59 kg/m2 Physical Exam  
Constitutional: He is oriented to person, place, and time and well-developed, well-nourished, and in no distress. No distress. HENT:  
Head: Normocephalic and atraumatic. Right Ear: Tympanic membrane, external ear and ear canal normal.  
Left Ear: Tympanic membrane, external ear and ear canal normal.  
Eyes: Conjunctivae and EOM are normal. Pupils are equal, round, and reactive to light. Cardiovascular: Normal rate, regular rhythm and normal heart sounds. Exam reveals no gallop and no friction rub. No murmur heard. Pulmonary/Chest: Effort normal and breath sounds normal. No respiratory distress. He has no wheezes. Neurological: He is alert and oriented to person, place, and time. Skin: Skin is warm and dry. No rash noted. He is not diaphoretic.   
Psychiatric: Mood, memory, affect and judgment normal.  
Nursing note and vitals reviewed. ASSESSMENT AND PLAN Joel Roman is a [de-identified] y.o. male who presents today for: 
 
1. On warfarin therapy Stable, continue current treatment pending review of labs. - CBC W/O DIFF 2. Acquired hypothyroidism Stable, continue current treatment pending review of labs. - TSH AND FREE T4 
 
3. Elevated serum creatinine Advised pt to return non-fasting for labs. - BASIC METABOLIC PANEL (7) 4. Glucose intolerance (impaired glucose tolerance) Stable, continue current treatment pending review of labs. I have reviewed/discussed the above normal BMI with the patient. I have recommended the following interventions: dietary management education, guidance, and counseling, encourage exercise, monitor weight and prescribed dietary intake.  
- HEMOGLOBIN A1C WITH EAG 
 
5. Bilateral hearing loss, unspecified hearing loss type Reassess with updated ENT for hearing loss and vertigo. 
- REFERRAL TO ENT-OTOLARYNGOLOGY 6. Essential tremor Pt declines mediations at this time; if he changes his mind, consider neurology given that beta blocker may not be the best choice for him given hx of Afib. 7. Encounter for immunization 
- varicella-zoster recombinant, PF, (SHINGRIX, PF,) 50 mcg/0.5 mL susr injection; 0.5 mL by IntraMUSCular route once for 1 dose. Please fax over vaccine documentation to [facility], Attn: Dr. Dominick Mendez at fax   Dispense: 0.5 mL; Refill: 0 There are no discontinued medications. Follow-up Disposition: 
Return in about 6 months (around 1/30/2019) for Medicare Wellness Visit (30 min). Time: 40 minutes was spent with this patient face to face discussing test results, follow up visits, and when repeat testing. I discussed diagnoses, risk factors and treatment for each based on current recommendations and literature.   Greater than 50% of total visit time was spent in counseling and coordination of care. 
 
Medication risks/benefits/costs/interactions/alternatives discussed with patient. Advised patient to call back or return to office if symptoms worsen/change/persist. If patient cannot reach us or should anything more severe/urgent arise he/she should proceed directly to the nearest emergency department. Discussed expected course/resolution/complications of diagnosis in detail with patient. Patient given a written after visit summary which includes his/her diagnoses, current medications and vitals. Patient expressed understanding with the diagnosis and plan.   
 
Alejandro Khalil M.D.

## 2018-07-30 NOTE — MR AVS SNAPSHOT
[address] [place] [address] 
[phone] Patient: Callie Friedman MRN: [id number] NNU:[birth year] Visit Information Date & Time Provider Department Dept. Phone Encounter #  
 7/30/2018  1:30 PM Kaylyn Beavers  [facility] [phone] [id number] Follow-up Instructions Return in about 6 months (around 1/30/2019) for Medicare Wellness Visit (30 min). Upcoming Health Maintenance Date Due Pneumococcal 65+ Low/Medium Risk (2 of 2 - PPSV23) 1/18/2018 GLAUCOMA SCREENING Q2Y 7/6/2018 Influenza Age 5 to Adult 8/1/2018 MEDICARE YEARLY EXAM 1/23/2019 DTaP/Tdap/Td series (2 - Td) 8/19/2019 Allergies as of 7/30/2018  Review Complete On: 7/30/2018 By: Tomer Ma Severity Noted Reaction Type Reactions Pcn [Penicillins]  04/01/2010    Rash Current Immunizations  Reviewed on 4/3/2017 Name Date Influenza High Dose Vaccine PF 9/22/2017 12:00 AM, 9/8/2016 Influenza Vaccine 8/20/2015 Influenza Vaccine Whole 9/2/2012 Pneumococcal Conjugate (PCV-13) 1/18/2017 Pneumococcal Vaccine (Pcv) 8/19/2009 TDAP Vaccine 8/19/2009 Zoster 5/1/2007 Not reviewed this visit You Were Diagnosed With   
  
 Codes Comments On warfarin therapy    -  Primary ICD-10-CM: Z79.01 
ICD-9-CM: V58.61 Acquired hypothyroidism     ICD-10-CM: E03.9 ICD-9-CM: 244.9 Elevated serum creatinine     ICD-10-CM: R79.89 ICD-9-CM: 790.99 Glucose intolerance (impaired glucose tolerance)     ICD-10-CM: R73.02 
ICD-9-CM: 790.22 Encounter for immunization     ICD-10-CM: R70 ICD-9-CM: V03.89 Vitals BP Pulse Temp Resp Height(growth percentile) Weight(growth percentile) 130/58 (BP 1 Location: Right arm, BP Patient Position: Sitting) (!) 57 98.3 °F (36.8 °C) (Oral) 18 5' 8.5\" (1.74 m) 210 lb 12.8 oz (95.6 kg) SpO2 BMI Smoking Status 97% 31.59 kg/m2 Never Smoker Vitals History BMI and BSA Data Body Mass Index Body Surface Area  
 31.59 kg/m 2 2.15 m 2 Preferred Pharmacy Pharmacy Name Phone Ivett Chen, [facility] [phone] Your Updated Medication List  
  
   
This list is accurate as of 18  2:18 PM.  Always use your most recent med list.  
  
  
  
  
 Jos House Take  by mouth two (2) times a day. flecainide 100 mg tablet Commonly known as:  TAMBOCOR Take 100 mg by mouth two (2) times a day. levothyroxine 150 mcg tablet Commonly known as:  SYNTHROID  
TAKE 1 TABLET BY MOUTH BEFORE BREAKFAST  
  
 varicella-zoster recombinant (PF) 50 mcg/0.5 mL Susr injection Commonly known as:  SHINGRIX (PF)  
0.5 mL by IntraMUSCular route once for 1 dose. Please fax over vaccine documentation to [address] [facility] Attn: Dr. Chiu Lay at fax  VITAMIN C PO Take  by mouth daily. warfarin 5 mg tablet Commonly known as:  COUMADIN Take 7.5 mg by mouth daily. Patient reports taking 5 mg  and  and 7.5 mg the other days. Prescriptions Printed Refills  
 varicella-zoster recombinant, PF, (SHINGRIX, PF,) 50 mcg/0.5 mL susr injection 0 Si.5 mL by IntraMUSCular route once for 1 dose. Please fax over vaccine documentation to [address] [facility], Attn: Dr. Chiu Lay at fax  Class: Print Route: IntraMUSCular We Performed the Following BASIC METABOLIC PANEL (7) [22302 CPT(R)] CBC W/O DIFF [77921 CPT(R)] HEMOGLOBIN A1C WITH EAG [05764 CPT(R)] TSH AND FREE T4 [44271 CPT(R)] Follow-up Instructions Return in about 6 months (around 2019) for Medicare Wellness Visit (30 min). Patient Instructions Hypothyroidism: Care Instructions Your Care Instructions You have hypothyroidism, which means that your body is not making enough thyroid hormone. This hormone helps your body use energy. If your thyroid level is low, you may feel tired, be constipated, have an increase in your blood pressure, or have dry skin or memory problems. You may also get cold easily, even when it is warm. Women with low thyroid levels may have heavy menstrual periods. A blood test to find your thyroid-stimulating hormone (TSH) level is used to check for hypothyroidism. A high TSH level may mean that you have low thyroid. When your body is not making enough thyroid hormone, TSH levels rise in an effort to make the body produce more. The treatment for hypothyroidism is to take thyroid hormone pills. You should start to feel better in 1 to 2 weeks. But it can take several months to see changes in the TSH level. You will need regular visits with your doctor to make sure you have the right dose of medicine. Most people need treatment for the rest of their lives. You will need to see your doctor regularly to have blood tests and to make sure you are doing well. Follow-up care is a key part of your treatment and safety. Be sure to make and go to all appointments, and call your doctor if you are having problems. It's also a good idea to know your test results and keep a list of the medicines you take. How can you care for yourself at home? · Take your thyroid hormone medicine exactly as prescribed. Call your doctor if you think you are having a problem with your medicine. Most people do not have side effects if they take the right amount of medicine regularly. ¨ Take the medicine 30 minutes before breakfast, and do not take it with calcium, vitamins, or iron. ¨ Do not take extra doses of your thyroid medicine. It will not help you get better any faster, and it may cause side effects. ¨ If you forget to take a dose, do NOT take a double dose of medicine. Take your usual dose the next day. · Tell your doctor about all prescription, herbal, or over-the-counter products you take. · Take care of yourself. Eat a healthy diet, get enough sleep, and get regular exercise. When should you call for help? Call 911 anytime you think you may need emergency care. For example, call if: 
  · You passed out (lost consciousness).  
  · You have severe trouble breathing.  
  · You have a very slow heartbeat (less than 60 beats a minute).  
  · You have a low body temperature (95°F or below).  
 Call your doctor now or seek immediate medical care if: 
  · You feel tired, sluggish, or weak.  
  · You have trouble remembering things or concentrating.  
  · You do not begin to feel better 2 weeks after starting your medicine.  
 Watch closely for changes in your health, and be sure to contact your doctor if you have any problems. Where can you learn more? Go to http://jas-marielle.info/. Enter J264 in the search box to learn more about \"Hypothyroidism: Care Instructions. \" Current as of: May 12, 2017 Content Version: 11.7 © 8344-0333 [employer]. Care instructions adapted under license by [employer] (which disclaims liability or warranty for this information). If you have questions about a medical condition or this instruction, always ask your healthcare professional. [address] any warranty or liability for your use of this information. Introducing \[facility] [place] [facility]\"" & HEALTH SERVICES! [employer] introduces [employer] patient portal. Now you can access parts of your medical record, email your doctor's office, and request medication refills online. 1. In your internet browser, go to [email or web address] 2. Click on the First Time User? Click Here link in the Sign In box. You will see the New Member Sign Up page. 3. Enter your [employer] Access Code exactly as it appears below.  You will not need to use this code after youve completed the sign-up process. If you do not sign up before the expiration date, you must request a new code. · [employer] Access Code: [email or web address] Expires: 10/28/2018  1:38 PM 
 
4. Enter the last four digits of your Social Security Number (xxxx) and Date of Birth (mm/dd/yyyy) as indicated and click Submit. You will be taken to the next sign-up page. 5. Create a [employer] ID. This will be your [employer] login ID and cannot be changed, so think of one that is secure and easy to remember. 6. Create a [employer] password. You can change your password at any time. 7. Enter your Password Reset Question and Answer. This can be used at a later time if you forget your password. 8. Enter your e-mail address. You will receive e-mail notification when new information is available in [address] 9. Click Sign Up. You can now view and download portions of your medical record. 10. Click the Download Summary menu link to download a portable copy of your medical information. If you have questions, please visit the Frequently Asked Questions section of the [employer] website. Remember, [employer] is NOT to be used for urgent needs. For medical emergencies, dial 911. Now available from your iPhone and Android! Please provide this summary of care documentation to your next provider. Your primary care clinician is listed as Annie Nur. If you have any questions after today's visit, please call [phone].

## 2018-07-31 ENCOUNTER — HOSPITAL ENCOUNTER (OUTPATIENT)
Dept: LAB | Age: 78
Discharge: HOME | End: 2018-07-31
Payer: MEDICARE

## 2018-07-31 PROCEDURE — 83036 HEMOGLOBIN GLYCOSYLATED A1C: CPT

## 2018-07-31 PROCEDURE — 80048 BASIC METABOLIC PNL TOTAL CA: CPT

## 2018-07-31 PROCEDURE — 36415 COLL VENOUS BLD VENIPUNCTURE: CPT

## 2018-07-31 PROCEDURE — 85027 COMPLETE CBC AUTOMATED: CPT

## 2018-07-31 PROCEDURE — 84443 ASSAY THYROID STIM HORMONE: CPT

## 2018-08-01 LAB
BUN SERPL-MCNC: 27 MG/DL (ref 8–27)
BUN/CREAT SERPL: 19 (ref 10–24)
CHLORIDE SERPL-SCNC: 101 MMOL/L (ref 96–106)
CO2 SERPL-SCNC: 24 MMOL/L (ref 20–29)
CREAT SERPL-MCNC: 1.41 MG/DL (ref 0.76–1.27)
ERYTHROCYTE [DISTWIDTH] IN BLOOD BY AUTOMATED COUNT: 13.8 % (ref 12.3–15.4)
EST. AVERAGE GLUCOSE BLD GHB EST-MCNC: 120 MG/DL
GLUCOSE SERPL-MCNC: 77 MG/DL (ref 65–99)
HBA1C MFR BLD: 5.8 % (ref 4.8–5.6)
HCT VFR BLD AUTO: 45.5 % (ref 37.5–51)
HGB BLD-MCNC: 15.4 G/DL (ref 13–17.7)
INTERPRETATION: NORMAL
MCH RBC QN AUTO: 33.3 PG (ref 26.6–33)
MCHC RBC AUTO-ENTMCNC: 33.8 G/DL (ref 31.5–35.7)
MCV RBC AUTO: 99 FL (ref 79–97)
PLATELET # BLD AUTO: 152 X10E3/UL (ref 150–379)
POTASSIUM SERPL-SCNC: 5.2 MMOL/L (ref 3.5–5.2)
RBC # BLD AUTO: 4.62 X10E6/UL (ref 4.14–5.8)
SODIUM SERPL-SCNC: 141 MMOL/L (ref 134–144)
T4 FREE SERPL-MCNC: 1.75 NG/DL (ref 0.82–1.77)
TSH SERPL DL<=0.005 MIU/L-ACNC: 3.51 UIU/ML (ref 0.45–4.5)
WBC # BLD AUTO: 6.3 X10E3/UL (ref 3.4–10.8)

## 2018-08-03 NOTE — PROGRESS NOTES
Please notify patient regarding their test results: TSH wnl, continue levothyroxine. Hemoglobin A1C (average blood sugar level for past 3 months) is in the pre diabetes range, which means your average sugar or glucose level is higher than normal. I would encourage healthy diets and regular exercise with the goal of maintaining a healthy weight before starting medications for this. Elevated kidney marker appears to be stable over time, may be some chronic kidney disease due to HTN. Will continue to monitor, eat low salt diet and remain hydrated with water.

## 2018-08-06 ENCOUNTER — TELEPHONE (OUTPATIENT)
Dept: FAMILY MEDICINE CLINIC | Age: 78
End: 2018-08-06

## 2018-08-06 NOTE — TELEPHONE ENCOUNTER
Attempted to reach patient x 3. Informed patient that we have been unable to reach him by phone. We will send a letter in mail with results and recommendations. Informed patient if there are any questions upon received the letter call us back. Re:    Notes Recorded by Yosvany Clark MD on 8/3/2018 at 1:19 PM  Please notify patient regarding their test results:    TSH wnl, continue levothyroxine. Hemoglobin A1C (average blood sugar level for past 3 months) is in the pre diabetes range, which means your average sugar or glucose level is higher than normal. I would encourage healthy diets and regular exercise with the goal of maintaining a healthy weight before starting medications for this. Elevated kidney marker appears to be stable over time, may be some chronic kidney disease due to HTN. Will continue to monitor, eat low salt diet and remain hydrated with water.

## 2018-08-06 NOTE — PROGRESS NOTES
Attempted to reach patient x 3. Informed patient that we have been unable to reach him by phone. We will send a letter in mail with results and recommendations. Informed patient if there are any questions upon received the letter call us back.

## 2018-08-14 RX ORDER — LEVOTHYROXINE SODIUM 150 UG/1
TABLET ORAL
Qty: 90 TAB | Refills: 1 | Status: CANCELLED | OUTPATIENT
Start: 2018-08-14

## 2018-08-14 NOTE — TELEPHONE ENCOUNTER
Pb. Pharm on file.       Requested Prescriptions     Pending Prescriptions Disp Refills    levothyroxine (SYNTHROID) 150 mcg tablet 90 Tab 1     Sig: TAKE 1 TABLET BY MOUTH BEFORE BREAKFAST

## 2018-08-17 ENCOUNTER — TELEPHONE (OUTPATIENT)
Dept: FAMILY MEDICINE CLINIC | Age: 78
End: 2018-08-17

## 2018-08-17 NOTE — TELEPHONE ENCOUNTER
----- Message from Ivanna Acosta sent at 8/17/2018 10:50 AM EDT -----  Regarding: Dr. Gerhardt Folks  The patient is requesting a call back to find out the status of the refill request for Rx Levothyroxin for a 90 day supply.  ([address] on file) [phone])[phone]

## 2018-08-17 NOTE — TELEPHONE ENCOUNTER
Call to patient.  verified. Informed patient that we sent over a 6 month supply in may.  He states he sees the refill and will call [facility]

## 2018-11-09 RX ORDER — LEVOTHYROXINE SODIUM 150 UG/1
TABLET ORAL
Qty: 90 TAB | Refills: 1 | Status: SHIPPED | OUTPATIENT
Start: 2018-11-09 | End: 2019-02-18 | Stop reason: SDUPTHER

## 2019-01-22 ENCOUNTER — HOSPITAL ENCOUNTER (OUTPATIENT)
Dept: MRI IMAGING | Age: 79
Discharge: HOME | End: 2019-01-22
Attending: ORTHOPAEDIC SURGERY
Payer: MEDICARE

## 2019-01-22 DIAGNOSIS — M25.511 RIGHT SHOULDER PAIN: ICD-10-CM

## 2019-01-22 PROCEDURE — 73221 MRI JOINT UPR EXTREM W/O DYE: CPT

## 2019-01-31 ENCOUNTER — OFFICE VISIT (OUTPATIENT)
Dept: FAMILY MEDICINE CLINIC | Age: 79
End: 2019-01-31

## 2019-01-31 ENCOUNTER — HOSPITAL ENCOUNTER (OUTPATIENT)
Dept: LAB | Age: 79
Discharge: HOME | End: 2019-01-31
Payer: MEDICARE

## 2019-01-31 VITALS
BODY MASS INDEX: 31.67 KG/M2 | OXYGEN SATURATION: 97 % | TEMPERATURE: 97.8 F | RESPIRATION RATE: 18 BRPM | WEIGHT: 209 LBS | HEIGHT: 68 IN | DIASTOLIC BLOOD PRESSURE: 56 MMHG | HEART RATE: 57 BPM | SYSTOLIC BLOOD PRESSURE: 122 MMHG

## 2019-01-31 DIAGNOSIS — I48.91 ATRIAL FIBRILLATION, UNSPECIFIED TYPE (HCC): ICD-10-CM

## 2019-01-31 DIAGNOSIS — E78.5 HYPERLIPIDEMIA, UNSPECIFIED HYPERLIPIDEMIA TYPE: ICD-10-CM

## 2019-01-31 DIAGNOSIS — Z79.01 ON WARFARIN THERAPY: ICD-10-CM

## 2019-01-31 DIAGNOSIS — E03.9 ACQUIRED HYPOTHYROIDISM: ICD-10-CM

## 2019-01-31 DIAGNOSIS — Z85.46 HISTORY OF PROSTATE CANCER: ICD-10-CM

## 2019-01-31 DIAGNOSIS — M79.676 PAIN OF TOE, UNSPECIFIED LATERALITY: ICD-10-CM

## 2019-01-31 DIAGNOSIS — R73.02 GLUCOSE INTOLERANCE (IMPAIRED GLUCOSE TOLERANCE): ICD-10-CM

## 2019-01-31 DIAGNOSIS — R79.89 ELEVATED SERUM CREATININE: ICD-10-CM

## 2019-01-31 DIAGNOSIS — Z00.00 MEDICARE ANNUAL WELLNESS VISIT, SUBSEQUENT: Primary | ICD-10-CM

## 2019-01-31 PROCEDURE — 85025 COMPLETE CBC W/AUTO DIFF WBC: CPT

## 2019-01-31 PROCEDURE — 80061 LIPID PANEL: CPT

## 2019-01-31 PROCEDURE — 36415 COLL VENOUS BLD VENIPUNCTURE: CPT

## 2019-01-31 PROCEDURE — 84443 ASSAY THYROID STIM HORMONE: CPT

## 2019-01-31 PROCEDURE — 80053 COMPREHEN METABOLIC PANEL: CPT

## 2019-01-31 PROCEDURE — 83036 HEMOGLOBIN GLYCOSYLATED A1C: CPT

## 2019-01-31 PROCEDURE — 84550 ASSAY OF BLOOD/URIC ACID: CPT

## 2019-01-31 PROCEDURE — 84153 ASSAY OF PSA TOTAL: CPT

## 2019-01-31 PROCEDURE — 82043 UR ALBUMIN QUANTITATIVE: CPT

## 2019-01-31 RX ORDER — BISMUTH SUBSALICYLATE 262 MG
1 TABLET,CHEWABLE ORAL DAILY
COMMUNITY

## 2019-01-31 NOTE — PROGRESS NOTES
Patient Name: Brooklynn Valdivia MRN: [id number] SUBJECTIVE Brooklynn Valdivia is a [de-identified] y.o. male who presents with the following:  
 
Patient states that he had an episode of acute toe pain back in November which resolved after a few days. May have had a second episode prior. That he thought his symptoms were due to gout therefore he has avoided foods that trigger a gout attack. He has had an elevated creatinine level over the past few months. Does not take NSAIDs regularly. Has a remote history of prostate cancer, would like to recheck PSA. History of hypothyroidism, currently on levothyroxine. He is on warfarin given history of atrial fibrillation, followed by cardiology. Wt Readings from Last 3 Encounters:  
01/31/19 209 lb (94.8 kg) 07/30/18 210 lb 12.8 oz (95.6 kg) 01/22/18 211 lb 12.8 oz (96.1 kg) Review of Systems Constitutional: Negative for fever, malaise/fatigue and weight loss. Respiratory: Negative for cough, hemoptysis, shortness of breath and wheezing. Cardiovascular: Negative for chest pain, palpitations, leg swelling and PND. Gastrointestinal: Negative for abdominal pain, constipation, diarrhea, nausea and vomiting. The patient's medications, allergies, past medical history, surgical history, family history and social history were reviewed and updated where appropriate. Prior to Admission medications Medication Sig Start Date End Date Taking? Authorizing Provider  
multivitamin (ONE A DAY) tablet Take 1 Tab by mouth daily. Yes Provider, Historical  
levothyroxine (SYNTHROID) 150 mcg tablet TAKE 1 TABLET BEFORE BREAKFAST 11/9/18  Yes Tam Orlando MD  
flecainide (TAMBOCOR) 100 mg tablet Take 100 mg by mouth two (2) times a day. 10/31/17  Yes Provider, Historical  
warfarin (COUMADIN) 5 mg tablet Take 7.5 mg by mouth daily. Patient reports taking 5 mg Tuesdays and Fridays and 7.5 mg the other days.  11/3/14  Yes Provider, Historical  
 ASCORBIC ACID (VITAMIN C PO) Take  by mouth daily. Yes Provider, Historical  
CALCIUM POLYCARBOPHIL (FIBERCON PO) Take  by mouth two (2) times a day. Provider, Historical  
 
 
Allergies Allergen Reactions  Penicillins Rash Other reaction(s): Rash OBJECTIVE Visit Vitals /56 (BP 1 Location: Right arm, BP Patient Position: Sitting) Pulse (!) 57 Temp 97.8 °F (36.6 °C) (Oral) Resp 18 Ht 5' 8\" (1.727 m) Wt 209 lb (94.8 kg) SpO2 97% BMI 31.78 kg/m² Physical Exam  
Constitutional: He is oriented to person, place, and time and well-developed, well-nourished, and in no distress. No distress. Eyes: Conjunctivae and EOM are normal. Pupils are equal, round, and reactive to light. Cardiovascular: Normal rate and normal heart sounds. Exam reveals no gallop and no friction rub. No murmur heard. Irregularly irregular Pulmonary/Chest: Effort normal and breath sounds normal. No respiratory distress. He has no wheezes. Neurological: He is alert and oriented to person, place, and time. Skin: Skin is warm and dry. No rash noted. He is not diaphoretic. Psychiatric: Mood, memory, affect and judgment normal.  
Nursing note and vitals reviewed. ASSESSMENT AND PLAN Leonard Sushil is a [de-identified] y.o. male who presents today for: 1. Medicare annual wellness visit, subsequent See other note 2. On warfarin therapy 
- CBC WITH AUTOMATED DIFF 3. Acquired hypothyroidism Stable, continue current treatment pending review of labs. - TSH AND FREE T4 
 
4. Elevated serum creatinine - MICROALBUMIN, UR, RAND W/ MICROALB/CREAT RATIO 5. Glucose intolerance (impaired glucose tolerance) 
- HEMOGLOBIN A1C WITH EAG 
 
6. History of prostate cancer - PSA, DIAGNOSTIC (PROSTATE SPECIFIC AG) 7. Hyperlipidemia, unspecified hyperlipidemia type Will calculate ASCVD risk score pending labs.  
- METABOLIC PANEL, COMPREHENSIVE 
- LIPID PANEL 
 
 8. Atrial fibrillation, unspecified type ([facility] [address] Stable, continue current treatment. 9. Pain of toe, unspecified laterality Will evaluate uric acid levels. - URIC ACID I have reviewed/discussed the above normal BMI with the patient. I have recommended the following interventions: dietary management education, guidance, and counseling, encourage exercise, monitor weight and prescribed dietary intake. Medications Discontinued During This Encounter Medication Reason  CALCIUM POLYCARBOPHIL (FIBERCON PO) Follow-up Disposition: 
Return in about 6 months (around 7/31/2019) for Medication Check. Medication risks/benefits/costs/interactions/alternatives discussed with patient. Advised patient to call back or return to office if symptoms worsen/change/persist. If patient cannot reach us or should anything more severe/urgent arise he/she should proceed directly to the nearest emergency department. Discussed expected course/resolution/complications of diagnosis in detail with patient. Patient given a written after visit summary which includes his/her diagnoses, current medications and vitals. Patient expressed understanding with the diagnosis and plan. Annie Tracey M.D.

## 2019-01-31 NOTE — ASSESSMENT & PLAN NOTE
This condition is managed by Specialist. 
Key CAD CHF Meds   
    
  
 flecainide (TAMBOCOR) 100 mg tablet Take 100 mg by mouth two (2) times a day. warfarin (COUMADIN) 5 mg tablet Take 7.5 mg by mouth daily. Patient reports taking 5 mg Tuesdays and Fridays and 7.5 mg the other days. Lab Results Component Value Date/Time  Sodium 141 07/31/2018 02:08 PM  
 Potassium 5.2 07/31/2018 02:08 PM  
 Cholesterol, total 190 01/22/2018 10:53 AM  
 HDL Cholesterol 50 01/22/2018 10:53 AM  
 LDL, calculated 125 01/22/2018 10:53 AM  
 Triglyceride 73 01/22/2018 10:53 AM

## 2019-01-31 NOTE — PROGRESS NOTES
This is the Subsequent Medicare Annual Wellness Exam, performed 12 months or more after the Initial AWV or the last Subsequent AWV I have reviewed the patient's medical history in detail and updated the computerized patient record. History Past Medical History:  
Diagnosis Date  Aortic insufficiency  Atrial fibrillation ([address]  Hearing loss 4/1/2010  Hematuria, microscopic 4/1/2010  Hypothyroidism 4/1/2010  On warfarin therapy  Prostate CA ([address]  Rectus diastasis 4/1/2010  Seborrheic dermatitis, unspecified 4/1/2010  Unspecified adverse effect of anesthesia DIFFICULT TO AWAKEN Past Surgical History:  
Procedure Laterality Date  ABDOMEN SURGERY PROC UNLISTED  4/12  
 hernia-  ENDOSCOPY, COLON, DIAGNOSTIC  11/19/07  
 w/polypectomy  HX GI    
 COLONOSCOPY  
 HX KNEE ARTHROSCOPY  7/08 MENISCECTOMY LEFT  
 HX KNEE ARTHROSCOPY    
 LEFT AND RIGHT ARTHROSCOPY  
 HX RADICAL PROSTATECTOMY  1994  HX TONSILLECTOMY Current Outpatient Medications Medication Sig Dispense Refill  multivitamin (ONE A DAY) tablet Take 1 Tab by mouth daily.  OTHER Take 2 Tabs by mouth daily. fibercon/calciun  levothyroxine (SYNTHROID) 150 mcg tablet TAKE 1 TABLET BEFORE BREAKFAST 90 Tab 1  
 flecainide (TAMBOCOR) 100 mg tablet Take 100 mg by mouth two (2) times a day.  warfarin (COUMADIN) 5 mg tablet Take 7.5 mg by mouth daily. Patient reports taking 5 mg Tuesdays and Fridays and 7.5 mg the other days.  ASCORBIC ACID (VITAMIN C PO) Take  by mouth daily. Allergies Allergen Reactions  Penicillins Rash Other reaction(s): Rash Family History Problem Relation Age of Onset  Stroke Mother  Heart Disease Father CHF  Diabetes Father  Cancer Sister   
     breast  
 Thyroid Disease Sister  Diabetes Daughter 9  
     insulin-dependent Social History Tobacco Use  
  Smoking status: Never Smoker  Smokeless tobacco: Never Used Substance Use Topics  Alcohol use: Yes Alcohol/week: 3.0 oz Types: 2 Glasses of wine, 4 Standard drinks or equivalent per week Comment: moderate Patient Active Problem List  
Diagnosis Code  Hearing loss H91.90  
 History of prostate cancer Z85.46  
 Hypothyroidism E03.9  
 HTN, goal below 140/90 I10  
 Hematuria, microscopic R31.29  
 Rectus diastasis M62.08  
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  
 Unspecified adverse effect of anesthesia T41.45XA  Atrial fibrillation (HCC) I48.91  
 On warfarin therapy Z79.01  
 Aortic insufficiency I35.1 Depression Risk Factor Screening: PHQ over the last two weeks 7/30/2018 Little interest or pleasure in doing things Not at all Feeling down, depressed, irritable, or hopeless Not at all Total Score PHQ 2 0 Alcohol Risk Factor Screening: On any occasion in the past three months you have had more than 7 drinks containing alcohol Functional Ability and Level of Safety:  
Hearing Loss Hearing is good. Activities of Daily Living The home contains: no safety equipment. Patient does total self care Fall Risk Fall Risk Assessment, last 12 mths 7/30/2018 Able to walk? Yes Fall in past 12 months? Yes Fall with injury? No  
Number of falls in past 12 months 2 Fall Risk Score 2 Abuse Screen Patient is not abused Cognitive Screening Evaluation of Cognitive Function: 
Has your family/caregiver stated any concerns about your memory: no 
 
 
Patient Care Team  
Patient Care Team: 
Latina Kehr, MD as PCP - [facility]) Grace Stacy MD (Cardiology) Henry Blount MD (Dermatology) Segundo Collins MD as Physician (Ophthalmology) Assessment/Plan Education and counseling provided: 
Are appropriate based on today's review and evaluation Diagnoses and all orders for this visit: 
 
1. Medicare annual wellness visit, subsequent 2. On warfarin therapy 
-     CBC WITH AUTOMATED DIFF 3. Acquired hypothyroidism 
-     TSH AND FREE T4 
 
4. Elevated serum creatinine -     MICROALBUMIN, UR, RAND W/ MICROALB/CREAT RATIO 5. Glucose intolerance (impaired glucose tolerance) 
-     HEMOGLOBIN A1C WITH EAG 
 
6. History of prostate cancer -     PSA, DIAGNOSTIC (PROSTATE SPECIFIC AG) 7. Hyperlipidemia, unspecified hyperlipidemia type -     METABOLIC PANEL, COMPREHENSIVE 
-     LIPID PANEL 8. Atrial fibrillation, unspecified type ([facility] [address] Assessment & Plan: This condition is managed by Specialist. 
Key CAD CHF Meds   
    
  
 flecainide (TAMBOCOR) 100 mg tablet Take 100 mg by mouth two (2) times a day. warfarin (COUMADIN) 5 mg tablet Take 7.5 mg by mouth daily. Patient reports taking 5 mg Tuesdays and Fridays and 7.5 mg the other days. Lab Results Component Value Date/Time Sodium 141 07/31/2018 02:08 PM  
 Potassium 5.2 07/31/2018 02:08 PM  
 Cholesterol, total 190 01/22/2018 10:53 AM  
 HDL Cholesterol 50 01/22/2018 10:53 AM  
 LDL, calculated 125 01/22/2018 10:53 AM  
 Triglyceride 73 01/22/2018 10:53 AM  
 
 
 
9. Pain of toe, unspecified laterality 
-     URIC ACID Health Maintenance Due Topic Date Due  Shingrix Vaccine Age 50> (1 of 2) 07/05/1988

## 2019-01-31 NOTE — PROGRESS NOTES
Chief Complaint Patient presents with [facility] Annual Wellness Visit G 7014 1. Have you been to the ER, urgent care clinic since your last visit? Hospitalized since your last visit? No 
 
2. Have you seen or consulted any other health care providers outside of the [facility] since your last visit? Include any pap smears or colon screening. No 
 
Patient stated that he saw the dermatologist in the December and January, and had a skin cancer removed from neck and forehead. Patient stated that he has received one of the 2 shingles vaccine, will obtain records.

## 2019-01-31 NOTE — PATIENT INSTRUCTIONS
Medicare Wellness Visit, Male The best way to live healthy is to have a lifestyle where you eat a well-balanced diet, exercise regularly, limit alcohol use, and quit all forms of tobacco/nicotine, if applicable. Regular preventive services are another way to keep healthy. Preventive services (vaccines, screening tests, monitoring & exams) can help personalize your care plan, which helps you manage your own care. Screening tests can find health problems at the earliest stages, when they are easiest to treat. [facility] follows the current, evidence-based guidelines published by the [facility] Keven Tyler ([facility]) when recommending preventive services for our patients. Because we follow these guidelines, sometimes recommendations change over time as research supports it. (For example, a prostate screening blood test is no longer routinely recommended for men with no symptoms.) Of course, you and your doctor may decide to screen more often for some diseases, based on your risk and co-morbidities (chronic disease you are already diagnosed with). Preventive services for you include: - Medicare offers their members a free annual wellness visit, which is time for you and your primary care provider to discuss and plan for your preventive service needs. Take advantage of this benefit every year! 
-All adults over age 72 should receive the recommended pneumonia vaccines. Current USPSTF guidelines recommend a series of two vaccines for the best pneumonia protection.  
-All adults should have a flu vaccine yearly and an ECG.  All adults age 61 and older should receive a shingles vaccine once in their lifetime.   
-All adults age 38-68 who are overweight should have a diabetes screening test once every three years.  
-Other screening tests & preventive services for persons with diabetes include: an eye exam to screen for diabetic retinopathy, a kidney function test, a foot exam, and stricter control over your cholesterol.  
-Cardiovascular screening for adults with routine risk involves an electrocardiogram (ECG) at intervals determined by the provider.  
-Colorectal cancer screening should be done for adults age 54-65 with no increased risk factors for colorectal cancer. There are a number of acceptable methods of screening for this type of cancer. Each test has its own benefits and drawbacks. Discuss with your provider what is most appropriate for you during your annual wellness visit. The different tests include: colonoscopy (considered the best screening method), a fecal occult blood test, a fecal DNA test, and sigmoidoscopy. 
-All adults born between [facility] should be screened once for Hepatitis C. 
-An Abdominal Aortic Aneurysm (AAA) Screening is recommended for men age 73-68 who has ever smoked in their lifetime. Here is a list of your current Health Maintenance items (your personalized list of preventive services) with a due date: 
Health Maintenance Due Topic Date Due  Shingles Vaccine (1 of 2) 07/05/1988  Flu Vaccine  08/01/2018 Ozzy Pearson Annual Well Visit  01/23/2019

## 2019-02-01 LAB
ALBUMIN SERPL-MCNC: 4.1 G/DL (ref 3.5–4.7)
ALBUMIN/CREAT UR: 822 MG/G CREAT (ref 0–30)
ALBUMIN/GLOB SERPL: 1.6 {RATIO} (ref 1.2–2.2)
ALP SERPL-CCNC: 73 IU/L (ref 39–117)
ALT SERPL-CCNC: 16 IU/L (ref 0–44)
AST SERPL-CCNC: 14 IU/L (ref 0–40)
BASOPHILS # BLD AUTO: 0 X10E3/UL (ref 0–0.2)
BASOPHILS NFR BLD AUTO: 0 %
BILIRUB SERPL-MCNC: 0.6 MG/DL (ref 0–1.2)
BUN SERPL-MCNC: 32 MG/DL (ref 8–27)
BUN/CREAT SERPL: 21 (ref 10–24)
CALCIUM SERPL-MCNC: 9.7 MG/DL (ref 8.6–10.2)
CHLORIDE SERPL-SCNC: 101 MMOL/L (ref 96–106)
CHOLEST SERPL-MCNC: 176 MG/DL (ref 100–199)
CO2 SERPL-SCNC: 27 MMOL/L (ref 20–29)
CREAT SERPL-MCNC: 1.53 MG/DL (ref 0.76–1.27)
CREAT UR-MCNC: 98.3 MG/DL
EOSINOPHIL # BLD AUTO: 0.2 X10E3/UL (ref 0–0.4)
EOSINOPHIL NFR BLD AUTO: 2 %
ERYTHROCYTE [DISTWIDTH] IN BLOOD BY AUTOMATED COUNT: 13.9 % (ref 12.3–15.4)
EST. AVERAGE GLUCOSE BLD GHB EST-MCNC: 123 MG/DL
GLOBULIN SER CALC-MCNC: 2.6 G/DL (ref 1.5–4.5)
GLUCOSE SERPL-MCNC: 101 MG/DL (ref 65–99)
HBA1C MFR BLD: 5.9 % (ref 4.8–5.6)
HCT VFR BLD AUTO: 48.8 % (ref 37.5–51)
HDLC SERPL-MCNC: 54 MG/DL
HGB BLD-MCNC: 16.3 G/DL (ref 13–17.7)
IMM GRANULOCYTES # BLD AUTO: 0 X10E3/UL (ref 0–0.1)
IMM GRANULOCYTES NFR BLD AUTO: 0 %
INTERPRETATION, 910389: NORMAL
INTERPRETATION: NORMAL
LDLC SERPL CALC-MCNC: 107 MG/DL (ref 0–99)
LYMPHOCYTES # BLD AUTO: 1.6 X10E3/UL (ref 0.7–3.1)
LYMPHOCYTES NFR BLD AUTO: 20 %
MCH RBC QN AUTO: 33.1 PG (ref 26.6–33)
MCHC RBC AUTO-ENTMCNC: 33.4 G/DL (ref 31.5–35.7)
MCV RBC AUTO: 99 FL (ref 79–97)
MICROALBUMIN UR-MCNC: 808 UG/ML
MONOCYTES # BLD AUTO: 1 X10E3/UL (ref 0.1–0.9)
MONOCYTES NFR BLD AUTO: 13 %
NEUTROPHILS # BLD AUTO: 5 X10E3/UL (ref 1.4–7)
NEUTROPHILS NFR BLD AUTO: 65 %
PDF IMAGE, 910387: NORMAL
PLATELET # BLD AUTO: 158 X10E3/UL (ref 150–379)
POTASSIUM SERPL-SCNC: 5.2 MMOL/L (ref 3.5–5.2)
PROT SERPL-MCNC: 6.7 G/DL (ref 6–8.5)
PSA SERPL-MCNC: <0.1 NG/ML (ref 0–4)
RBC # BLD AUTO: 4.93 X10E6/UL (ref 4.14–5.8)
SODIUM SERPL-SCNC: 142 MMOL/L (ref 134–144)
T4 FREE SERPL-MCNC: 2.14 NG/DL (ref 0.82–1.77)
TRIGL SERPL-MCNC: 75 MG/DL (ref 0–149)
TSH SERPL DL<=0.005 MIU/L-ACNC: 4.8 UIU/ML (ref 0.45–4.5)
URATE SERPL-MCNC: 8.5 MG/DL (ref 3.7–8.6)
VLDLC SERPL CALC-MCNC: 15 MG/DL (ref 5–40)
WBC # BLD AUTO: 7.7 X10E3/UL (ref 3.4–10.8)

## 2019-02-04 DIAGNOSIS — E03.9 HYPOTHYROIDISM, UNSPECIFIED TYPE: ICD-10-CM

## 2019-02-04 DIAGNOSIS — R79.89 ELEVATED SERUM CREATININE: Primary | ICD-10-CM

## 2019-02-04 NOTE — PROGRESS NOTES
Please notify patient regarding their test results: 
 
Hemoglobin A1C (average blood sugar level for past 3 months) is in the pre diabetes range, which means your average sugar or glucose level is higher than normal. I would encourage healthy diet and regular exercise with the goal of maintaining a healthy weight before starting medications for this. Kidney marker is high and some protein is in the urine; recommend pt to come by lab in one month to recheck labs (do not fast). Encourage adequate hydration with water, avoid NSAIDs, and try low salt diet. Thyroid level is a little off; will recheck with next month's labs before med changes. LDL cholesterol mildly high; continue statin.  
Prostate marker is normal. 
Gout level is normal.

## 2019-02-26 ENCOUNTER — TELEPHONE (OUTPATIENT)
Dept: FAMILY MEDICINE CLINIC | Age: 79
End: 2019-02-26

## 2019-02-26 NOTE — TELEPHONE ENCOUNTER
Outgoing call to patient. LVM  Patient notified that lab orders are in the system, and can come in and have the labs done.

## 2019-03-07 ENCOUNTER — HOSPITAL ENCOUNTER (OUTPATIENT)
Dept: LAB | Age: 79
Discharge: HOME | End: 2019-03-07
Payer: MEDICARE

## 2019-03-07 PROCEDURE — 84443 ASSAY THYROID STIM HORMONE: CPT

## 2019-03-07 PROCEDURE — 84481 FREE ASSAY (FT-3): CPT

## 2019-03-07 PROCEDURE — 80048 BASIC METABOLIC PNL TOTAL CA: CPT

## 2019-03-07 PROCEDURE — 36415 COLL VENOUS BLD VENIPUNCTURE: CPT

## 2019-03-08 LAB
BUN SERPL-MCNC: 28 MG/DL (ref 8–27)
BUN/CREAT SERPL: 21 (ref 10–24)
CHLORIDE SERPL-SCNC: 103 MMOL/L (ref 96–106)
CO2 SERPL-SCNC: 25 MMOL/L (ref 20–29)
CREAT SERPL-MCNC: 1.33 MG/DL (ref 0.76–1.27)
GLUCOSE SERPL-MCNC: 94 MG/DL (ref 65–99)
INTERPRETATION: NORMAL
POTASSIUM SERPL-SCNC: 4.5 MMOL/L (ref 3.5–5.2)
SODIUM SERPL-SCNC: 143 MMOL/L (ref 134–144)
T3FREE SERPL-MCNC: 2.3 PG/ML (ref 2–4.4)
T4 FREE SERPL-MCNC: 1.9 NG/DL (ref 0.82–1.77)
TSH SERPL DL<=0.005 MIU/L-ACNC: 3.64 UIU/ML (ref 0.45–4.5)

## 2019-03-08 NOTE — PROGRESS NOTES
Outbound call to patient.  verified. Patient made aware of lab results and recommendations. Patient verbalized understanding.

## 2019-03-08 NOTE — PROGRESS NOTES
Please notify patient regarding their test results:    Thyroid level is normal, continue current levothyroxine dose. Kidney marker is still a little bit elevated but better than last time. Patient to remain well-hydrated continue with low-salt diet, and avoiding NSAIDs.

## 2019-05-10 PROBLEM — M17.12 PRIMARY LOCALIZED OSTEOARTHRITIS OF LEFT KNEE: Status: ACTIVE | Noted: 2019-05-10

## 2019-05-10 NOTE — H&P
Chief Complaint: Follow-up of the Left Knee 
  Hermelindo Mcmullen comes in to the clinic today for follow-up of his left knee. Patient is tentatively schedule for left total knee replacement for May 8, 2019. When he was seen several months ago for his left knee symptoms, he had quite severe pain to the left knee. Today, he tells me that he does not have that much pain at the present time. No recent injuries or complications were noted.  
  
  
Review of Systems 4/2/2019 
  
Constitutional: Unexplained: Negative Genitourinary: Frequent Urination: Positive HEENT: Vision Loss: Negative Neurological: Memory Loss: Negative Integumentary: Rash: Negative Cardiovascular: Palpatations: Negative Hematologic: Bruises/Bleeds Easily: Positive Gastrointestinal: Constipation: Negative Immunological: Seasonal Allergies: Negative Musculoskeletal: Joint Pain: Positive 
  
Medical History Past Medical History:  
Diagnosis Date  Atrial fibrillation    
 Cancer    
  
  
  
Surgical History Past Surgical History:  
Procedure Laterality Date  KNEE SURGERY      
 NO RELEVANT SURGERIES      
  
  
  
Objective:  
  
There were no vitals filed for this visit. 
  
Constitutional:  No acute distress. Well nourished. Well developed. Eyes:  Sclera are nonicteric. Respiratory:  No labored breathing. Cardiovascular:  No marked edema. Skin:  No marked skin ulcers. Neurological:  No marked sensory loss noted. Psychiatric: Alert and oriented x3. Musculoskeletal  
  
On exam of the left knee reveals a lack of 3 degrees to full extension with flexion to 117 degrees. Diffuse tenderness and crepitus with ROM, more so medially. Synovial hypertrophy. The cruciate and collateral ligaments are stable. No effusion. No sign of infection. No ecchymosis or erythema. No cellulitis or rash. No calf pain, no evidence of DVT. I detect no obvious motor or sensory deficits in the lower extremities.  The extensor mechanism is intact. The neurovascular status is intact. 
  
  
Imaging/Studies: No imaging obtained  
  
Assessment:  
There is no problem list on file for this patient. 
  
  
    ICD-10-CM 1. Primary osteoarthritis of left knee M17.12  
2. Synovitis of left knee M65.9  
  
  
  
Plan: I reviewed my previous X-rays findings with the patient and explained that he has near complete loss of the medial joint space with degenerative changes to the patellofemoral and lateral compartment. Since the last office visit, the patient did some research on stem cell treatment and he decided to not proceed with this treatment options. We discussed other treatment options for the knee. We discussed about visco-supplementation and I reviewed the principles and expectations of the procedure. I explained that I cannot predict the degree of improvement although I indicated that it would only provide him temporary symptomatic relief. I explained the hospitalization, post-op and rehabilitation for the procedure. We discussed all complications associated with the surgery, including the chance of infection, DVT, and pulmonary embolus. I explained the use of antibiotics and anti-coagulants following surgery to prevent infection and DVT and we discussed the course of post-op physical therapy for rehabilitation. PROCEDURE: Left total knee replacement. Date of Surgery Update: 
Chon Funez was seen and examined. Past Medical History:  
Diagnosis Date  Aortic insufficiency  Arthritis  Atrial fibrillation ([address]  Chronic pain  Hearing loss 4/1/2010  Hematuria, microscopic 4/1/2010  Hypothyroidism 4/1/2010  On warfarin therapy  Prostate CA ([address] 1992  Rectus diastasis 4/1/2010  Seborrheic dermatitis, unspecified 4/1/2010  Unspecified adverse effect of anesthesia DIFFICULT TO AWAKEN Prior to Admission Medications Prescriptions Last Dose Informant Patient Reported? Taking? ASCORBIC ACID (VITAMIN C PO)   Yes No  
Sig: Take 500 mg by mouth daily (after breakfast). OTHER   Yes No  
Sig: Take 2 Tabs by mouth every evening. fibercon/calciun   
acetaminophen (TYLENOL) 500 mg tablet   Yes No  
Sig: Take 500 mg by mouth every six (6) hours as needed for Pain. flecainide (TAMBOCOR) 100 mg tablet   Yes No  
Sig: Take 100 mg by mouth two (2) times a day. levothyroxine (SYNTHROID) 150 mcg tablet   No No  
Sig: TAKE 1 TABLET BEFORE BREAKFAST  
multivitamin (ONE A DAY) tablet   Yes No  
Sig: Take 1 Tab by mouth daily. warfarin (COUMADIN) 5 mg tablet   Yes No  
Sig: Take 5 mg by mouth. Indications: TUESDAYS AND FRIDAYS  
warfarin (COUMADIN) 7.5 mg tablet   Yes No  
Sig: Take 7.5 mg by mouth. Indications: MONDAYS, WEDNESDAYS, THURSDAYS ,[address] AND SUNDAYS Facility-Administered Medications: None Allergy to:Penicillins Physical Examination: General appearance - alert, well appearing, and in no distress History and physical has been reviewed. The patient has been examined. There have been no significant clinical changes since the completion of the originally dated History and Physical. 
 
Signed By: Elaine Guerra PA-C  May 16, 2019 7:33 AM

## 2019-05-13 ENCOUNTER — HOSPITAL ENCOUNTER (OUTPATIENT)
Dept: PREADMISSION TESTING | Age: 79
Discharge: HOME | DRG: 470 | End: 2019-05-13
Payer: MEDICARE

## 2019-05-13 VITALS
SYSTOLIC BLOOD PRESSURE: 161 MMHG | HEIGHT: 69 IN | DIASTOLIC BLOOD PRESSURE: 60 MMHG | HEART RATE: 55 BPM | WEIGHT: 203 LBS | TEMPERATURE: 97.5 F | BODY MASS INDEX: 30.07 KG/M2

## 2019-05-13 LAB
ABO + RH BLD: NORMAL
ANION GAP SERPL CALC-SCNC: 5 MMOL/L (ref 5–15)
APPEARANCE UR: CLEAR
BACTERIA URNS QL MICRO: NEGATIVE /HPF
BILIRUB UR QL: NEGATIVE
BLOOD GROUP ANTIBODIES SERPL: NORMAL
BUN SERPL-MCNC: 32 MG/DL (ref 6–20)
BUN/CREAT SERPL: 28 (ref 12–20)
CALCIUM SERPL-MCNC: 8.7 MG/DL (ref 8.5–10.1)
CHLORIDE SERPL-SCNC: 109 MMOL/L (ref 97–108)
CO2 SERPL-SCNC: 28 MMOL/L (ref 21–32)
COLOR UR: ABNORMAL
CREAT SERPL-MCNC: 1.16 MG/DL (ref 0.7–1.3)
EPITH CASTS URNS QL MICRO: ABNORMAL /LPF
ERYTHROCYTE [DISTWIDTH] IN BLOOD BY AUTOMATED COUNT: 14 % (ref 11.5–14.5)
EST. AVERAGE GLUCOSE BLD GHB EST-MCNC: 117 MG/DL
GLUCOSE SERPL-MCNC: 85 MG/DL (ref 65–100)
GLUCOSE UR STRIP.AUTO-MCNC: NEGATIVE MG/DL
HBA1C MFR BLD: 5.7 % (ref 4.2–6.3)
HCT VFR BLD AUTO: 45 % (ref 36.6–50.3)
HGB BLD-MCNC: 14.4 G/DL (ref 12.1–17)
HGB UR QL STRIP: ABNORMAL
HYALINE CASTS URNS QL MICRO: ABNORMAL /LPF (ref 0–5)
INR PPP: 3.2 (ref 0.9–1.1)
KETONES UR QL STRIP.AUTO: NEGATIVE MG/DL
LEUKOCYTE ESTERASE UR QL STRIP.AUTO: NEGATIVE
MCH RBC QN AUTO: 33.1 PG (ref 26–34)
MCHC RBC AUTO-ENTMCNC: 32 G/DL (ref 30–36.5)
MCV RBC AUTO: 103.4 FL (ref 80–99)
NITRITE UR QL STRIP.AUTO: NEGATIVE
NRBC # BLD: 0 K/UL (ref 0–0.01)
NRBC BLD-RTO: 0 PER 100 WBC
PH UR STRIP: 5.5 [PH] (ref 5–8)
PLATELET # BLD AUTO: 134 K/UL (ref 150–400)
PMV BLD AUTO: 10.7 FL (ref 8.9–12.9)
POTASSIUM SERPL-SCNC: 4.5 MMOL/L (ref 3.5–5.1)
PROT UR STRIP-MCNC: 30 MG/DL
PROTHROMBIN TIME: 30.3 SEC (ref 9–11.1)
RBC # BLD AUTO: 4.35 M/UL (ref 4.1–5.7)
RBC #/AREA URNS HPF: ABNORMAL /HPF (ref 0–5)
SODIUM SERPL-SCNC: 142 MMOL/L (ref 136–145)
SP GR UR REFRACTOMETRY: 1.02 (ref 1–1.03)
SPECIMEN EXP DATE BLD: NORMAL
UA: UC IF INDICATED,UAUC: ABNORMAL
UROBILINOGEN UR QL STRIP.AUTO: 0.2 EU/DL (ref 0.2–1)
WBC # BLD AUTO: 7.1 K/UL (ref 4.1–11.1)
WBC URNS QL MICRO: ABNORMAL /HPF (ref 0–4)

## 2019-05-13 PROCEDURE — 86900 BLOOD TYPING SEROLOGIC ABO: CPT

## 2019-05-13 PROCEDURE — 80048 BASIC METABOLIC PNL TOTAL CA: CPT

## 2019-05-13 PROCEDURE — 83036 HEMOGLOBIN GLYCOSYLATED A1C: CPT

## 2019-05-13 PROCEDURE — 36415 COLL VENOUS BLD VENIPUNCTURE: CPT

## 2019-05-13 PROCEDURE — 85027 COMPLETE CBC AUTOMATED: CPT

## 2019-05-13 PROCEDURE — 85610 PROTHROMBIN TIME: CPT

## 2019-05-13 PROCEDURE — 81001 URINALYSIS AUTO W/SCOPE: CPT

## 2019-05-13 RX ORDER — WARFARIN 7.5 MG/1
7.5 TABLET ORAL
COMMUNITY
End: 2021-02-01 | Stop reason: ALTCHOICE

## 2019-05-13 RX ORDER — ACETAMINOPHEN 500 MG
500 TABLET ORAL
COMMUNITY
End: 2020-01-30

## 2019-05-13 RX ORDER — WARFARIN SODIUM 5 MG/1
5 TABLET ORAL
COMMUNITY
End: 2021-02-01 | Stop reason: ALTCHOICE

## 2019-05-13 NOTE — PERIOP NOTES
PREOPERATIVE INSTRUCTIONS REVIEWED WITH PATIENT. PATIENT GIVEN TWO--BOTTLES OF CHG SOAPS  INSTRUCTIONS REVIEWED ON USE OF CHG SOAPS  PATIENT GIVEN SSI INFECTIONS SHEET; MRSA/MSSA TREATMENT INSTRUCTION SHEET GIVEN WITH AN EXPLANATION TO PATIENT THAT THEY WILL BE NOTIFIED IF TREATMENT INSTRUCTIONS NEED TO BE INITIATED. PATIENT WAS GIVEN THE OPPORTUNITY TO ASK QUESTIONS; REGARDING THE INFORMATION PROVIDED. PREOP DIET AND NUTRITION UPDATED GUIDELINES/ INSTRUCTIONS PROVIDED     PATIENT ATTENDED PREOP JOINT CLASS TODAY.

## 2019-05-14 LAB
BACTERIA SPEC CULT: NORMAL
BACTERIA SPEC CULT: NORMAL
SERVICE CMNT-IMP: NORMAL

## 2019-05-15 ENCOUNTER — ANESTHESIA EVENT (OUTPATIENT)
Dept: SURGERY | Age: 79
DRG: 470 | End: 2019-05-15
Payer: MEDICARE

## 2019-05-16 ENCOUNTER — ANESTHESIA (OUTPATIENT)
Dept: SURGERY | Age: 79
DRG: 470 | End: 2019-05-16
Payer: MEDICARE

## 2019-05-16 ENCOUNTER — HOSPITAL ENCOUNTER (INPATIENT)
Age: 79
LOS: 2 days | Discharge: HOME HEALTH | DRG: 470 | End: 2019-05-18
Attending: ORTHOPAEDIC SURGERY | Admitting: ORTHOPAEDIC SURGERY
Payer: MEDICARE

## 2019-05-16 DIAGNOSIS — M17.12 PRIMARY LOCALIZED OSTEOARTHRITIS OF LEFT KNEE: Primary | ICD-10-CM

## 2019-05-16 LAB
GLUCOSE BLD STRIP.AUTO-MCNC: 86 MG/DL (ref 65–100)
INR BLD: 1.3 (ref 0.9–1.2)
SERVICE CMNT-IMP: NORMAL

## 2019-05-16 PROCEDURE — 74011000250 HC RX REV CODE- 250

## 2019-05-16 PROCEDURE — 77030034850: Performed by: ORTHOPAEDIC SURGERY

## 2019-05-16 PROCEDURE — 77030040361 HC SLV COMPR DVT MDII -B

## 2019-05-16 PROCEDURE — 77030018836 HC SOL IRR NACL ICUM -A: Performed by: ORTHOPAEDIC SURGERY

## 2019-05-16 PROCEDURE — 74011250637 HC RX REV CODE- 250/637: Performed by: ORTHOPAEDIC SURGERY

## 2019-05-16 PROCEDURE — 77030018673: Performed by: ORTHOPAEDIC SURGERY

## 2019-05-16 PROCEDURE — 76060000036 HC ANESTHESIA 2.5 TO 3 HR: Performed by: ORTHOPAEDIC SURGERY

## 2019-05-16 PROCEDURE — 76010000172 HC OR TIME 2.5 TO 3 HR INTENSV-TIER 1: Performed by: ORTHOPAEDIC SURGERY

## 2019-05-16 PROCEDURE — 74011000258 HC RX REV CODE- 258: Performed by: PHYSICIAN ASSISTANT

## 2019-05-16 PROCEDURE — 77030031139 HC SUT VCRL2 J&J -A: Performed by: ORTHOPAEDIC SURGERY

## 2019-05-16 PROCEDURE — 77030018846 HC SOL IRR STRL H20 ICUM -A: Performed by: ORTHOPAEDIC SURGERY

## 2019-05-16 PROCEDURE — 77030014077 HC TOWER MX CEM J&J -C: Performed by: ORTHOPAEDIC SURGERY

## 2019-05-16 PROCEDURE — 77030032490 HC SLV COMPR SCD KNE COVD -B: Performed by: ORTHOPAEDIC SURGERY

## 2019-05-16 PROCEDURE — 82962 GLUCOSE BLOOD TEST: CPT

## 2019-05-16 PROCEDURE — C1776 JOINT DEVICE (IMPLANTABLE): HCPCS | Performed by: ORTHOPAEDIC SURGERY

## 2019-05-16 PROCEDURE — 77030035236 HC SUT PDS STRATFX BARB J&J -B: Performed by: ORTHOPAEDIC SURGERY

## 2019-05-16 PROCEDURE — 74011250636 HC RX REV CODE- 250/636: Performed by: ANESTHESIOLOGY

## 2019-05-16 PROCEDURE — 77030019908 HC STETH ESOPH SIMS -A: Performed by: ANESTHESIOLOGY

## 2019-05-16 PROCEDURE — 77030008684 HC TU ET CUF COVD -B: Performed by: ANESTHESIOLOGY

## 2019-05-16 PROCEDURE — 77030012935 HC DRSG AQUACEL BMS -B: Performed by: ORTHOPAEDIC SURGERY

## 2019-05-16 PROCEDURE — 64450 NJX AA&/STRD OTHER PN/BRANCH: CPT

## 2019-05-16 PROCEDURE — C9290 INJ, BUPIVACAINE LIPOSOME: HCPCS | Performed by: PHYSICIAN ASSISTANT

## 2019-05-16 PROCEDURE — 74011250637 HC RX REV CODE- 250/637: Performed by: PHYSICIAN ASSISTANT

## 2019-05-16 PROCEDURE — 85610 PROTHROMBIN TIME: CPT

## 2019-05-16 PROCEDURE — 74011250637 HC RX REV CODE- 250/637: Performed by: ANESTHESIOLOGY

## 2019-05-16 PROCEDURE — 74011250636 HC RX REV CODE- 250/636: Performed by: PHYSICIAN ASSISTANT

## 2019-05-16 PROCEDURE — 77030011640 HC PAD GRND REM COVD -A: Performed by: ORTHOPAEDIC SURGERY

## 2019-05-16 PROCEDURE — 65270000029 HC RM PRIVATE

## 2019-05-16 PROCEDURE — 77030026438 HC STYL ET INTUB CARD -A: Performed by: ANESTHESIOLOGY

## 2019-05-16 PROCEDURE — 77030027138 HC INCENT SPIROMETER -A

## 2019-05-16 PROCEDURE — 77030006835 HC BLD SAW SAG STRY -B: Performed by: ORTHOPAEDIC SURGERY

## 2019-05-16 PROCEDURE — 74011000250 HC RX REV CODE- 250: Performed by: PHYSICIAN ASSISTANT

## 2019-05-16 PROCEDURE — 0SRD0J9 REPLACEMENT OF LEFT KNEE JOINT WITH SYNTHETIC SUBSTITUTE, CEMENTED, OPEN APPROACH: ICD-10-PCS | Performed by: ORTHOPAEDIC SURGERY

## 2019-05-16 PROCEDURE — 74011250636 HC RX REV CODE- 250/636

## 2019-05-16 PROCEDURE — C1713 ANCHOR/SCREW BN/BN,TIS/BN: HCPCS | Performed by: ORTHOPAEDIC SURGERY

## 2019-05-16 PROCEDURE — 74011000250 HC RX REV CODE- 250: Performed by: ORTHOPAEDIC SURGERY

## 2019-05-16 PROCEDURE — 3E0T3BZ INTRODUCTION OF ANESTHETIC AGENT INTO PERIPHERAL NERVES AND PLEXI, PERCUTANEOUS APPROACH: ICD-10-PCS | Performed by: ANESTHESIOLOGY

## 2019-05-16 PROCEDURE — 77030008467 HC STPLR SKN COVD -B: Performed by: ORTHOPAEDIC SURGERY

## 2019-05-16 PROCEDURE — 76210000016 HC OR PH I REC 1 TO 1.5 HR: Performed by: ORTHOPAEDIC SURGERY

## 2019-05-16 PROCEDURE — 77030039497 HC CST PAD STERILE CHCS -A: Performed by: ORTHOPAEDIC SURGERY

## 2019-05-16 PROCEDURE — 77030028907 HC WRP KNEE WO BGS SOLM -B

## 2019-05-16 DEVICE — SMARTSET GHV GENTAMICIN HIGH VISCOSITY BONE CEMENT 40G
Type: IMPLANTABLE DEVICE | Site: KNEE | Status: FUNCTIONAL
Brand: SMARTSET

## 2019-05-16 DEVICE — ATTUNE KNEE SYSTEM TIBIAL INSERT FIXED BEARING POSTERIOR STABILIZED 8 5MM AOX
Type: IMPLANTABLE DEVICE | Site: KNEE | Status: FUNCTIONAL
Brand: ATTUNE

## 2019-05-16 DEVICE — ATTUNE KNEE SYSTEM TIBIAL BASE FIXED BEARING SIZE 8 CEMENTED
Type: IMPLANTABLE DEVICE | Site: KNEE | Status: FUNCTIONAL
Brand: ATTUNE

## 2019-05-16 DEVICE — INSERT TIB RP FEM KNEE CEM: Type: IMPLANTABLE DEVICE | Status: FUNCTIONAL

## 2019-05-16 DEVICE — ATTUNE KNEE SYSTEM FEMORAL POSTERIOR STABILIZED SIZE 8 LEFT CEMENTED
Type: IMPLANTABLE DEVICE | Site: KNEE | Status: FUNCTIONAL
Brand: ATTUNE

## 2019-05-16 DEVICE — ATTUNE PATELLA MEDIALIZED DOME 41MM CEMENTED AOX
Type: IMPLANTABLE DEVICE | Site: KNEE | Status: FUNCTIONAL
Brand: ATTUNE

## 2019-05-16 RX ORDER — ONDANSETRON 4 MG/1
4 TABLET, ORALLY DISINTEGRATING ORAL
Status: DISCONTINUED | OUTPATIENT
Start: 2019-05-16 | End: 2019-05-18 | Stop reason: HOSPADM

## 2019-05-16 RX ORDER — HYDROXYZINE HYDROCHLORIDE 10 MG/1
10 TABLET, FILM COATED ORAL
Status: DISCONTINUED | OUTPATIENT
Start: 2019-05-16 | End: 2019-05-18 | Stop reason: HOSPADM

## 2019-05-16 RX ORDER — CEFAZOLIN SODIUM/WATER 2 G/20 ML
2 SYRINGE (ML) INTRAVENOUS EVERY 8 HOURS
Status: COMPLETED | OUTPATIENT
Start: 2019-05-16 | End: 2019-05-17

## 2019-05-16 RX ORDER — FENTANYL CITRATE 50 UG/ML
INJECTION, SOLUTION INTRAMUSCULAR; INTRAVENOUS AS NEEDED
Status: DISCONTINUED | OUTPATIENT
Start: 2019-05-16 | End: 2019-05-16 | Stop reason: HOSPADM

## 2019-05-16 RX ORDER — AMOXICILLIN 250 MG
1 CAPSULE ORAL 2 TIMES DAILY
Status: DISCONTINUED | OUTPATIENT
Start: 2019-05-16 | End: 2019-05-18 | Stop reason: HOSPADM

## 2019-05-16 RX ORDER — MORPHINE SULFATE 10 MG/ML
2 INJECTION, SOLUTION INTRAMUSCULAR; INTRAVENOUS
Status: DISCONTINUED | OUTPATIENT
Start: 2019-05-16 | End: 2019-05-16 | Stop reason: HOSPADM

## 2019-05-16 RX ORDER — PROPOFOL 10 MG/ML
INJECTION, EMULSION INTRAVENOUS AS NEEDED
Status: DISCONTINUED | OUTPATIENT
Start: 2019-05-16 | End: 2019-05-16 | Stop reason: HOSPADM

## 2019-05-16 RX ORDER — SODIUM CHLORIDE 0.9 % (FLUSH) 0.9 %
5-40 SYRINGE (ML) INJECTION AS NEEDED
Status: DISCONTINUED | OUTPATIENT
Start: 2019-05-16 | End: 2019-05-16 | Stop reason: HOSPADM

## 2019-05-16 RX ORDER — SUCCINYLCHOLINE CHLORIDE 20 MG/ML
INJECTION INTRAMUSCULAR; INTRAVENOUS AS NEEDED
Status: DISCONTINUED | OUTPATIENT
Start: 2019-05-16 | End: 2019-05-16 | Stop reason: HOSPADM

## 2019-05-16 RX ORDER — HYDROMORPHONE HYDROCHLORIDE 1 MG/ML
0.5 INJECTION, SOLUTION INTRAMUSCULAR; INTRAVENOUS; SUBCUTANEOUS
Status: ACTIVE | OUTPATIENT
Start: 2019-05-16 | End: 2019-05-17

## 2019-05-16 RX ORDER — FENTANYL CITRATE 50 UG/ML
50 INJECTION, SOLUTION INTRAMUSCULAR; INTRAVENOUS AS NEEDED
Status: DISCONTINUED | OUTPATIENT
Start: 2019-05-16 | End: 2019-05-16 | Stop reason: HOSPADM

## 2019-05-16 RX ORDER — NALOXONE HYDROCHLORIDE 0.4 MG/ML
0.4 INJECTION, SOLUTION INTRAMUSCULAR; INTRAVENOUS; SUBCUTANEOUS AS NEEDED
Status: DISCONTINUED | OUTPATIENT
Start: 2019-05-16 | End: 2019-05-18 | Stop reason: HOSPADM

## 2019-05-16 RX ORDER — CELECOXIB 200 MG/1
200 CAPSULE ORAL ONCE
Status: COMPLETED | OUTPATIENT
Start: 2019-05-16 | End: 2019-05-16

## 2019-05-16 RX ORDER — NEOSTIGMINE METHYLSULFATE 1 MG/ML
INJECTION INTRAVENOUS AS NEEDED
Status: DISCONTINUED | OUTPATIENT
Start: 2019-05-16 | End: 2019-05-16 | Stop reason: HOSPADM

## 2019-05-16 RX ORDER — SODIUM CHLORIDE 0.9 % (FLUSH) 0.9 %
5-40 SYRINGE (ML) INJECTION EVERY 8 HOURS
Status: DISCONTINUED | OUTPATIENT
Start: 2019-05-16 | End: 2019-05-16 | Stop reason: HOSPADM

## 2019-05-16 RX ORDER — ROCURONIUM BROMIDE 10 MG/ML
INJECTION, SOLUTION INTRAVENOUS AS NEEDED
Status: DISCONTINUED | OUTPATIENT
Start: 2019-05-16 | End: 2019-05-16 | Stop reason: HOSPADM

## 2019-05-16 RX ORDER — SODIUM CHLORIDE, SODIUM LACTATE, POTASSIUM CHLORIDE, CALCIUM CHLORIDE 600; 310; 30; 20 MG/100ML; MG/100ML; MG/100ML; MG/100ML
125 INJECTION, SOLUTION INTRAVENOUS CONTINUOUS
Status: DISCONTINUED | OUTPATIENT
Start: 2019-05-16 | End: 2019-05-16 | Stop reason: HOSPADM

## 2019-05-16 RX ORDER — WARFARIN SODIUM 5 MG/1
5 TABLET ORAL ONCE
Status: COMPLETED | OUTPATIENT
Start: 2019-05-16 | End: 2019-05-16

## 2019-05-16 RX ORDER — MIDAZOLAM HYDROCHLORIDE 1 MG/ML
1 INJECTION, SOLUTION INTRAMUSCULAR; INTRAVENOUS AS NEEDED
Status: DISCONTINUED | OUTPATIENT
Start: 2019-05-16 | End: 2019-05-16 | Stop reason: HOSPADM

## 2019-05-16 RX ORDER — OXYCODONE AND ACETAMINOPHEN 5; 325 MG/1; MG/1
1 TABLET ORAL AS NEEDED
Status: DISCONTINUED | OUTPATIENT
Start: 2019-05-16 | End: 2019-05-16 | Stop reason: HOSPADM

## 2019-05-16 RX ORDER — GLYCOPYRROLATE 0.2 MG/ML
INJECTION INTRAMUSCULAR; INTRAVENOUS AS NEEDED
Status: DISCONTINUED | OUTPATIENT
Start: 2019-05-16 | End: 2019-05-16 | Stop reason: HOSPADM

## 2019-05-16 RX ORDER — HYDROMORPHONE HYDROCHLORIDE 1 MG/ML
0.2 INJECTION, SOLUTION INTRAMUSCULAR; INTRAVENOUS; SUBCUTANEOUS
Status: DISCONTINUED | OUTPATIENT
Start: 2019-05-16 | End: 2019-05-16 | Stop reason: HOSPADM

## 2019-05-16 RX ORDER — LIDOCAINE HYDROCHLORIDE 10 MG/ML
0.1 INJECTION, SOLUTION EPIDURAL; INFILTRATION; INTRACAUDAL; PERINEURAL AS NEEDED
Status: DISCONTINUED | OUTPATIENT
Start: 2019-05-16 | End: 2019-05-16 | Stop reason: HOSPADM

## 2019-05-16 RX ORDER — CEFAZOLIN SODIUM 1 G/3ML
INJECTION, POWDER, FOR SOLUTION INTRAMUSCULAR; INTRAVENOUS AS NEEDED
Status: DISCONTINUED | OUTPATIENT
Start: 2019-05-16 | End: 2019-05-16 | Stop reason: HOSPADM

## 2019-05-16 RX ORDER — OXYCODONE HYDROCHLORIDE 5 MG/1
5 TABLET ORAL
Status: DISCONTINUED | OUTPATIENT
Start: 2019-05-16 | End: 2019-05-18 | Stop reason: HOSPADM

## 2019-05-16 RX ORDER — DIPHENHYDRAMINE HYDROCHLORIDE 50 MG/ML
12.5 INJECTION, SOLUTION INTRAMUSCULAR; INTRAVENOUS AS NEEDED
Status: DISCONTINUED | OUTPATIENT
Start: 2019-05-16 | End: 2019-05-16 | Stop reason: HOSPADM

## 2019-05-16 RX ORDER — FENTANYL CITRATE 50 UG/ML
25 INJECTION, SOLUTION INTRAMUSCULAR; INTRAVENOUS
Status: DISCONTINUED | OUTPATIENT
Start: 2019-05-16 | End: 2019-05-16 | Stop reason: HOSPADM

## 2019-05-16 RX ORDER — LIDOCAINE HYDROCHLORIDE 20 MG/ML
INJECTION, SOLUTION EPIDURAL; INFILTRATION; INTRACAUDAL; PERINEURAL AS NEEDED
Status: DISCONTINUED | OUTPATIENT
Start: 2019-05-16 | End: 2019-05-16 | Stop reason: HOSPADM

## 2019-05-16 RX ORDER — MIDAZOLAM HYDROCHLORIDE 1 MG/ML
INJECTION, SOLUTION INTRAMUSCULAR; INTRAVENOUS AS NEEDED
Status: DISCONTINUED | OUTPATIENT
Start: 2019-05-16 | End: 2019-05-16 | Stop reason: HOSPADM

## 2019-05-16 RX ORDER — DEXAMETHASONE SODIUM PHOSPHATE 4 MG/ML
INJECTION, SOLUTION INTRA-ARTICULAR; INTRALESIONAL; INTRAMUSCULAR; INTRAVENOUS; SOFT TISSUE AS NEEDED
Status: DISCONTINUED | OUTPATIENT
Start: 2019-05-16 | End: 2019-05-16 | Stop reason: HOSPADM

## 2019-05-16 RX ORDER — ACETAMINOPHEN 325 MG/1
650 TABLET ORAL ONCE
Status: COMPLETED | OUTPATIENT
Start: 2019-05-16 | End: 2019-05-16

## 2019-05-16 RX ORDER — TRANEXAMIC ACID 100 MG/ML
INJECTION, SOLUTION INTRAVENOUS AS NEEDED
Status: DISCONTINUED | OUTPATIENT
Start: 2019-05-16 | End: 2019-05-16 | Stop reason: HOSPADM

## 2019-05-16 RX ORDER — HYDROMORPHONE HYDROCHLORIDE 2 MG/ML
INJECTION, SOLUTION INTRAMUSCULAR; INTRAVENOUS; SUBCUTANEOUS AS NEEDED
Status: DISCONTINUED | OUTPATIENT
Start: 2019-05-16 | End: 2019-05-16 | Stop reason: HOSPADM

## 2019-05-16 RX ORDER — SODIUM CHLORIDE 9 MG/ML
125 INJECTION, SOLUTION INTRAVENOUS CONTINUOUS
Status: DISPENSED | OUTPATIENT
Start: 2019-05-16 | End: 2019-05-17

## 2019-05-16 RX ORDER — OXYCODONE HYDROCHLORIDE 5 MG/1
10 TABLET ORAL
Status: DISCONTINUED | OUTPATIENT
Start: 2019-05-16 | End: 2019-05-18 | Stop reason: HOSPADM

## 2019-05-16 RX ORDER — POLYETHYLENE GLYCOL 3350 17 G/17G
17 POWDER, FOR SOLUTION ORAL DAILY
Status: DISCONTINUED | OUTPATIENT
Start: 2019-05-17 | End: 2019-05-18 | Stop reason: HOSPADM

## 2019-05-16 RX ORDER — FAMOTIDINE 20 MG/1
20 TABLET, FILM COATED ORAL
Status: DISCONTINUED | OUTPATIENT
Start: 2019-05-16 | End: 2019-05-18 | Stop reason: HOSPADM

## 2019-05-16 RX ORDER — SODIUM CHLORIDE 0.9 % (FLUSH) 0.9 %
5-40 SYRINGE (ML) INJECTION EVERY 8 HOURS
Status: DISCONTINUED | OUTPATIENT
Start: 2019-05-16 | End: 2019-05-18 | Stop reason: HOSPADM

## 2019-05-16 RX ORDER — CELECOXIB 200 MG/1
200 CAPSULE ORAL 2 TIMES DAILY
Status: DISCONTINUED | OUTPATIENT
Start: 2019-05-16 | End: 2019-05-18 | Stop reason: HOSPADM

## 2019-05-16 RX ORDER — ACETAMINOPHEN 325 MG/1
650 TABLET ORAL EVERY 6 HOURS
Status: DISCONTINUED | OUTPATIENT
Start: 2019-05-16 | End: 2019-05-18 | Stop reason: HOSPADM

## 2019-05-16 RX ORDER — SODIUM CHLORIDE 0.9 % (FLUSH) 0.9 %
5-40 SYRINGE (ML) INJECTION AS NEEDED
Status: DISCONTINUED | OUTPATIENT
Start: 2019-05-16 | End: 2019-05-18 | Stop reason: HOSPADM

## 2019-05-16 RX ORDER — LEVOTHYROXINE SODIUM 150 UG/1
150 TABLET ORAL
Status: DISCONTINUED | OUTPATIENT
Start: 2019-05-17 | End: 2019-05-18 | Stop reason: HOSPADM

## 2019-05-16 RX ORDER — FLECAINIDE ACETATE 100 MG/1
100 TABLET ORAL 2 TIMES DAILY
Status: DISCONTINUED | OUTPATIENT
Start: 2019-05-16 | End: 2019-05-18 | Stop reason: HOSPADM

## 2019-05-16 RX ORDER — SODIUM CHLORIDE 9 MG/ML
25 INJECTION, SOLUTION INTRAVENOUS CONTINUOUS
Status: DISCONTINUED | OUTPATIENT
Start: 2019-05-16 | End: 2019-05-16 | Stop reason: HOSPADM

## 2019-05-16 RX ORDER — MIDAZOLAM HYDROCHLORIDE 1 MG/ML
0.5 INJECTION, SOLUTION INTRAMUSCULAR; INTRAVENOUS
Status: DISCONTINUED | OUTPATIENT
Start: 2019-05-16 | End: 2019-05-16 | Stop reason: HOSPADM

## 2019-05-16 RX ORDER — CEFAZOLIN SODIUM/WATER 2 G/20 ML
2 SYRINGE (ML) INTRAVENOUS EVERY 8 HOURS
Status: DISCONTINUED | OUTPATIENT
Start: 2019-05-16 | End: 2019-05-16 | Stop reason: HOSPADM

## 2019-05-16 RX ORDER — ONDANSETRON 2 MG/ML
4 INJECTION INTRAMUSCULAR; INTRAVENOUS AS NEEDED
Status: DISCONTINUED | OUTPATIENT
Start: 2019-05-16 | End: 2019-05-16 | Stop reason: HOSPADM

## 2019-05-16 RX ORDER — FACIAL-BODY WIPES
10 EACH TOPICAL DAILY PRN
Status: DISCONTINUED | OUTPATIENT
Start: 2019-05-18 | End: 2019-05-18 | Stop reason: HOSPADM

## 2019-05-16 RX ORDER — ROPIVACAINE HYDROCHLORIDE 5 MG/ML
INJECTION, SOLUTION EPIDURAL; INFILTRATION; PERINEURAL
Status: COMPLETED | OUTPATIENT
Start: 2019-05-16 | End: 2019-05-16

## 2019-05-16 RX ADMIN — HYDROMORPHONE HYDROCHLORIDE 0.5 MG: 2 INJECTION, SOLUTION INTRAMUSCULAR; INTRAVENOUS; SUBCUTANEOUS at 11:30

## 2019-05-16 RX ADMIN — SUCCINYLCHOLINE CHLORIDE 120 MG: 20 INJECTION INTRAMUSCULAR; INTRAVENOUS at 10:50

## 2019-05-16 RX ADMIN — PROPOFOL 120 MG: 10 INJECTION, EMULSION INTRAVENOUS at 10:50

## 2019-05-16 RX ADMIN — ACETAMINOPHEN 650 MG: 325 TABLET ORAL at 09:03

## 2019-05-16 RX ADMIN — ACETAMINOPHEN 650 MG: 325 TABLET ORAL at 20:13

## 2019-05-16 RX ADMIN — CELECOXIB 200 MG: 200 CAPSULE ORAL at 09:03

## 2019-05-16 RX ADMIN — DEXAMETHASONE SODIUM PHOSPHATE 4 MG: 4 INJECTION, SOLUTION INTRA-ARTICULAR; INTRALESIONAL; INTRAMUSCULAR; INTRAVENOUS; SOFT TISSUE at 11:03

## 2019-05-16 RX ADMIN — MIDAZOLAM HYDROCHLORIDE 1 MG: 1 INJECTION, SOLUTION INTRAMUSCULAR; INTRAVENOUS at 10:41

## 2019-05-16 RX ADMIN — SODIUM CHLORIDE 125 ML/HR: 900 INJECTION, SOLUTION INTRAVENOUS at 20:58

## 2019-05-16 RX ADMIN — FENTANYL CITRATE 50 MCG: 50 INJECTION, SOLUTION INTRAMUSCULAR; INTRAVENOUS at 09:31

## 2019-05-16 RX ADMIN — SODIUM CHLORIDE, SODIUM LACTATE, POTASSIUM CHLORIDE, AND CALCIUM CHLORIDE 125 ML/HR: 600; 310; 30; 20 INJECTION, SOLUTION INTRAVENOUS at 09:03

## 2019-05-16 RX ADMIN — GLYCOPYRROLATE 0.4 MG: 0.2 INJECTION INTRAMUSCULAR; INTRAVENOUS at 12:43

## 2019-05-16 RX ADMIN — ACETAMINOPHEN 650 MG: 325 TABLET ORAL at 15:13

## 2019-05-16 RX ADMIN — SENNOSIDES, DOCUSATE SODIUM 1 TABLET: 50; 8.6 TABLET, FILM COATED ORAL at 17:25

## 2019-05-16 RX ADMIN — WARFARIN SODIUM 5 MG: 5 TABLET ORAL at 15:13

## 2019-05-16 RX ADMIN — SODIUM CHLORIDE, SODIUM LACTATE, POTASSIUM CHLORIDE, AND CALCIUM CHLORIDE: 600; 310; 30; 20 INJECTION, SOLUTION INTRAVENOUS at 10:42

## 2019-05-16 RX ADMIN — HYDROMORPHONE HYDROCHLORIDE 0.5 MG: 2 INJECTION, SOLUTION INTRAMUSCULAR; INTRAVENOUS; SUBCUTANEOUS at 11:56

## 2019-05-16 RX ADMIN — LIDOCAINE HYDROCHLORIDE 80 MG: 20 INJECTION, SOLUTION EPIDURAL; INFILTRATION; INTRACAUDAL; PERINEURAL at 10:50

## 2019-05-16 RX ADMIN — FENTANYL CITRATE 100 MCG: 50 INJECTION, SOLUTION INTRAMUSCULAR; INTRAVENOUS at 10:47

## 2019-05-16 RX ADMIN — SODIUM CHLORIDE 125 ML/HR: 900 INJECTION, SOLUTION INTRAVENOUS at 13:56

## 2019-05-16 RX ADMIN — HYDROMORPHONE HYDROCHLORIDE 0.5 MG: 2 INJECTION, SOLUTION INTRAMUSCULAR; INTRAVENOUS; SUBCUTANEOUS at 11:14

## 2019-05-16 RX ADMIN — CELECOXIB 200 MG: 200 CAPSULE ORAL at 20:13

## 2019-05-16 RX ADMIN — CEFAZOLIN SODIUM 2 G: 1 INJECTION, POWDER, FOR SOLUTION INTRAMUSCULAR; INTRAVENOUS at 10:59

## 2019-05-16 RX ADMIN — PROPOFOL 30 MG: 10 INJECTION, EMULSION INTRAVENOUS at 10:54

## 2019-05-16 RX ADMIN — HYDROMORPHONE HYDROCHLORIDE 0.5 MG: 2 INJECTION, SOLUTION INTRAMUSCULAR; INTRAVENOUS; SUBCUTANEOUS at 13:21

## 2019-05-16 RX ADMIN — Medication 2 G: at 18:31

## 2019-05-16 RX ADMIN — BENZOCAINE AND MENTHOL 1 LOZENGE: 15; 3.6 LOZENGE ORAL at 23:37

## 2019-05-16 RX ADMIN — ROPIVACAINE HYDROCHLORIDE 20 ML: 5 INJECTION, SOLUTION EPIDURAL; INFILTRATION; PERINEURAL at 09:53

## 2019-05-16 RX ADMIN — ROCURONIUM BROMIDE 10 MG: 10 INJECTION, SOLUTION INTRAVENOUS at 10:50

## 2019-05-16 RX ADMIN — FLECAINIDE ACETATE 100 MG: 100 TABLET ORAL at 17:26

## 2019-05-16 RX ADMIN — NEOSTIGMINE METHYLSULFATE 3 MG: 1 INJECTION INTRAVENOUS at 12:43

## 2019-05-16 RX ADMIN — ROCURONIUM BROMIDE 10 MG: 10 INJECTION, SOLUTION INTRAVENOUS at 11:56

## 2019-05-16 RX ADMIN — MIDAZOLAM HYDROCHLORIDE 2 MG: 1 INJECTION, SOLUTION INTRAMUSCULAR; INTRAVENOUS at 09:31

## 2019-05-16 RX ADMIN — ROCURONIUM BROMIDE 30 MG: 10 INJECTION, SOLUTION INTRAVENOUS at 11:02

## 2019-05-16 NOTE — ROUTINE PROCESS
Patient: Geno Cantu MRN: [id number]  SSN: [id number] YOB: 1940  Age: [de-identified] y.o. Sex: male Patient is status post Procedure(s): LEFT TOTAL KNEE REPLACEMENT. Surgeon(s) and Role: * Mat Hammer MD - Primary Local/Dose/Irrigation:  20 ml exparel, 30 ml half marcaine plain, 20 ml saline. Peripheral IV Left;Posterior Hand (Active) Airway - Endotracheal Tube 05/16/19 (Active) Dressing/Packing:  Wound Knee Left-Dressing Type: (staples, aquacel, cast padding, ace.) (05/16/19 1100) Splint/Cast:  ] Other:

## 2019-05-16 NOTE — PROGRESS NOTES
Bedside and Verbal shift change report given to [address] Varela (oncoming nurse) by Radha Briseno (offgoing nurse). Report included the following information SBAR, Kardex, Procedure Summary, Intake/Output, MAR and Accordion.

## 2019-05-16 NOTE — ANESTHESIA PREPROCEDURE EVALUATION
Relevant Problems No relevant active problems Anesthetic History No history of anesthetic complications Review of Systems / Medical History Patient summary reviewed, nursing notes reviewed and pertinent labs reviewed Pulmonary Within defined limits Neuro/Psych Within defined limits Cardiovascular Hypertension Dysrhythmias : atrial fibrillation Comments: Mod AI  
GI/Hepatic/Renal 
Within defined limits Endo/Other Hypothyroidism Other Findings Physical Exam 
 
Airway Mallampati: II 
TM Distance: > 6 cm Neck ROM: normal range of motion Mouth opening: Normal 
 
 Cardiovascular Regular rate and rhythm,  S1 and S2 normal,  no murmur, click, rub, or gallop Dental 
No notable dental hx Pulmonary Breath sounds clear to auscultation Abdominal 
GI exam deferred Other Findings Anesthetic Plan ASA: 3 Anesthesia type: general 
 
 
Post-op pain plan if not by surgeon: peripheral nerve block single Induction: Intravenous Anesthetic plan and risks discussed with: Patient

## 2019-05-16 NOTE — PROGRESS NOTES
Primary Nurse Toney Britton and Shari RN performed a dual skin assessment on this patient No impairment noted Zane score is 21

## 2019-05-16 NOTE — H&P
[address]   HISTORY AND PHYSICAL    Name:  Kalani Willis  MR#:  [id number]  :  1940  ACCOUNT #:  [de-identified]  ADMIT DATE:  2019    HISTORY OF PRESENT ILLNESS:  The patient is admitted today for left knee pain. I have followed the patient for a long period of time with severe arthritis of the left knee. He has basically bone-on-bone contact. He is scheduled to undergo left total knee replacement. PHYSICAL EXAMINATION:  On exam, he has diffuse tenderness about the left knee. The knee is stable. No evidence of calf vein DVT or infection. IMPRESSION:  Advanced degenerative arthritis of left knee. PLAN:  He is scheduled to undergo left total knee replacement. I have reviewed with him the procedure, postop course, postop rehab. He understands all potential complications. We discussed the issues of infection. He is on Coumadin for atrial fibrillation, and we will resume Coumadin postop. He understands the issues with DVT and pulmonary embolus. I answered all of his questions regarding left total knee replacement.       Hanny Lozano MD [id number]  D:  2019 10:28  T:  2019 10:37  JOB #:  [id number]  CC:  Candelario Montoya MD

## 2019-05-16 NOTE — ANESTHESIA PROCEDURE NOTES
Peripheral Block    Performed by: Lisa Moser DO  Authorized by: Lisa Moser DO       Pre-procedure: Indications: at surgeon's request and post-op pain management    Preanesthetic Checklist: patient identified, risks and benefits discussed, site marked, timeout performed, anesthesia consent given and patient being monitored      Block Type:   Block Type:   Adductor canal  Laterality:  Left  Monitoring:  Standard ASA monitoring, responsive to questions, continuous pulse ox, oxygen, frequent vital sign checks and heart rate  Injection Technique:  Single shot  Procedures: ultrasound guided    Patient Position: supine  Prep: chlorhexidine    Location:  Mid thigh  Needle Type:  Stimuplex  Needle Gauge:  20 G  Needle Localization:  Ultrasound guidance    Assessment:  Number of attempts:  1  Injection Assessment:  Incremental injection every 5 mL, no paresthesia, local visualized surrounding nerve on ultrasound, negative aspiration for blood and no intravascular symptoms  Patient tolerance:  Patient tolerated the procedure well with no immediate complications

## 2019-05-16 NOTE — BRIEF OP NOTE
BRIEF OPERATIVE NOTE Date of Procedure: 5/16/2019 Preoperative Diagnosis: DEGENERATIVE JOINT DISEASE LEFT KNEE Postoperative Diagnosis: DEGENERATIVE JOINT DISEASE LEFT KNEE Procedure(s): LEFT TOTAL KNEE REPLACEMENT Surgeon(s) and Role: * León Britt MD - Primary Surgical Assistant: Grecia Starr PA-C Surgical Staff: 
Circ-1: Mona Keane Circ-Relief: Violetta Shane RN Physician Assistant: Yolanda Potts PA-C Scrub Tech-1: Donaldo Waldron Scrub RN-Relief: Mary Walters RN Event Time In Time Out Incision Start 1117 Incision Close 1302 Anesthesia: Spinal  
Estimated Blood Loss: 200 mL Specimens: * No specimens in log * Findings: DJD Left knee Complications: None. Implants:  
Implant Name Type Inv. Item Serial No.  Lot No. LRB No. Used Action CEMENT BNE GENTAMC GHV 40GM -- SMARTSET - SN/A  CEMENT BNE GENTAMC GHV 40GM -- SMARTSET N/A [facility] [employer] [id number] Left 2 Implanted PAT ДМИТРИЙ DOME MEDIAL 41MM -- ATTUNE - SNA  PAT ДМИТРИЙ DOME MEDIAL 41MM -- ATTUNE NA [facility] [employer] [id number] Left 1 Implanted BEARING TIB BASE FIX SZ8 ДМИТРИЙ -- ATTUNE - SNA  BEARING TIB BASE FIX SZ8 ДМИТРИЙ -- ATTUNE NA [facility] [employer] [id number] Left 1 Implanted FEM PS SZ 8 LT ДМИТРИЙ -- ATTUNE - SNA  FEM PS SZ 8 LT ДМИТРИЙ -- ATTUNE NA [facility] [employer] [id number] Left 1 Implanted INSERT TIB FB PS SZ 8 5MM -- ATTUNE - SNA  INSERT TIB FB PS SZ 8 5MM -- ATTUNE NA [facility] [employer] [id number] Left 1 Implanted

## 2019-05-16 NOTE — ANESTHESIA POSTPROCEDURE EVALUATION
Post-Anesthesia Evaluation and Assessment Patient: Lexis Geiger MRN: [id number]  SSN: [id number] YOB: 1940  Age: [de-identified] y.o. Sex: male I have evaluated the patient and they are stable and ready for discharge from the PACU. Cardiovascular Function/Vital Signs Visit Vitals /61 Pulse 64 Temp 37 °C (98.6 °F) Resp 11 SpO2 100% Patient is status post Spinal anesthesia for Procedure(s): LEFT TOTAL KNEE REPLACEMENT. Nausea/Vomiting: None Postoperative hydration reviewed and adequate. Pain: 
Pain Scale 1: Numeric (0 - 10) (05/16/19 1319) Pain Intensity 1: 0 (05/16/19 1319) Managed Neurological Status:  
Neuro (WDL): Exceptions to WDL (05/16/19 1319) Neuro Neurologic State: Drowsy (05/16/19 1319) LUE Motor Response: Purposeful (05/16/19 1319) LLE Motor Response: Purposeful (05/16/19 1319) RUE Motor Response: Purposeful (05/16/19 1319) RLE Motor Response: Purposeful (05/16/19 1319) At baseline Mental Status, Level of Consciousness: Alert and  oriented to person, place, and time Pulmonary Status:  
O2 Device: Nasal cannula (05/16/19 1319) Adequate oxygenation and airway patent Complications related to anesthesia: None Post-anesthesia assessment completed. No concerns Signed By: Jahaira Hernandez MD   
 May 16, 2019 Procedure(s): LEFT TOTAL KNEE REPLACEMENT. general 
 
<BSHSIANPOST> Vitals Value Taken Time /48 5/16/2019  1:45 PM  
Temp 37 °C (98.6 °F) 5/16/2019  1:19 PM  
Pulse 64 5/16/2019  1:49 PM  
Resp 15 5/16/2019  1:49 PM  
SpO2 98 % 5/16/2019  1:49 PM  
Vitals shown include unvalidated device data.

## 2019-05-16 NOTE — PERIOP NOTES
TRANSFER - OUT REPORT: 
 
Verbal report given to Joe Eucedar (name) on Kunal Hood  being transferred to  (unit) for routine post - op Report consisted of patients Situation, Background, Assessment and  
Recommendations(SBAR). Time Pre op antibiotic [id number] Anesthesia Stop time: 0527 Booker Present on Transfer to floor:no Information from the following report(s) SBAR. OR summary, procedure summary, Intake/Output, MAR,  was reviewed with the receiving nurse. Opportunity for questions and clarification was provided. Is the patient on 02? NO Is the patient on a monitor? NO Is the nurse transporting with the patient? NO Surgical Waiting Area notified of patient's transfer from PACU? YES The following personal items collected during your admission accompanied patient upon transfer:  
Dental Appliance: Dental Appliances: None Vision:   
Hearing Aid:   
Jewelry:   
Clothing: Clothing: (clothing and glasses sent to [facility] of belongings and glasses with patient in PACU Other Valuables:   
Valuables sent to safe:

## 2019-05-16 NOTE — PROGRESS NOTES
Ortho Post-Op Note 5/16/2019 
3:46 PM 
 
POD:  Day of Surgery S/P:  Procedure(s): LEFT TOTAL KNEE REPLACEMENT Afebrile/VSS, NAD, oriented, still slightly groggy from anesthesia- GET Doing well without complaints of nausea Pain well controlled Calves soft/NTTP Bilaterally Compartments soft, able to SLR Dressing clean and dry Moving lower extremities well. Neurocirculatory exam intact and within normal range. Lab Results Component Value Date/Time HGB 14.4 05/13/2019 12:42 PM  
 INR 3.2 (H) 05/13/2019 12:42 PM  
 INR (POC) 1.3 (H) 05/16/2019 09:19 AM  
 
Recent Labs 05/13/19 
1242 03/07/19 
0831 01/31/19 
1027 07/31/18 
1408 CREA 1.16 1.33* 1.53* 1.41* BUN 32* 28* 32* 27 CrCl cannot be calculated (Unknown ideal weight.). PLAN: 
DVT prophylaxis: warfarin, H/O a-fib WBAT with PT-mobilization Pain Control- tylenol, celebrex, oxycodone, and ice Plan to D/C home with HH/PT 1-2 days AURELIO Chicas

## 2019-05-16 NOTE — PROGRESS NOTES
TRANSFER - IN REPORT: 
 
Verbal report received from JERRY Arreaga(name) on Eliz Grief  being received from [place]) for routine post - op Report consisted of patients Situation, Background, Assessment and  
Recommendations(SBAR). Information from the following report(s) SBAR, Kardex, OR Summary, Procedure Summary, Intake/Output, MAR, Accordion and Recent Results was reviewed with the receiving nurse. Opportunity for questions and clarification was provided. Assessment completed upon patients arrival to unit and care assumed.

## 2019-05-16 NOTE — PROGRESS NOTES
Patient assessed for readiness to ambulate. Vital Signs Level of Consciousness: Alert Temp: 97.6 °F (36.4 °C) Temp Source: Oral 
Pulse (Heart Rate): 67 Heart Rate Source: Monitor Cardiac Rhythm: AVB- 1st degree Resp Rate: 16 
BP: 152/70 MAP (Monitor): 81 
MAP (Calculated): 97 BP 1 Location: Right arm BP 1 Method: Automatic 
BP Patient Position: Standing MEWS Score: 1. Patient ambulated with assistance of 2 nurses. Patient ambulated with gait belt and walker. Patient walked to Doorway. Patient returned safely to bed.  
 
 
2219 Bedside shift change report given to Jeff Brambila RN (oncoming nurse) by [employer], RN (offgoing nurse). Report included the following information SBAR, Kardex and Recent Results.

## 2019-05-17 ENCOUNTER — HOME HEALTH ADMISSION (OUTPATIENT)
Dept: HOME HEALTH SERVICES | Facility: HOME HEALTH | Age: 79
End: 2019-05-17
Payer: MEDICARE

## 2019-05-17 LAB
ANION GAP SERPL CALC-SCNC: 7 MMOL/L (ref 5–15)
BUN SERPL-MCNC: 28 MG/DL (ref 6–20)
BUN/CREAT SERPL: 18 (ref 12–20)
CALCIUM SERPL-MCNC: 7.8 MG/DL (ref 8.5–10.1)
CHLORIDE SERPL-SCNC: 108 MMOL/L (ref 97–108)
CO2 SERPL-SCNC: 25 MMOL/L (ref 21–32)
CREAT SERPL-MCNC: 1.54 MG/DL (ref 0.7–1.3)
GLUCOSE SERPL-MCNC: 137 MG/DL (ref 65–100)
HGB BLD-MCNC: 11.3 G/DL (ref 12.1–17)
INR PPP: 1.4 (ref 0.9–1.1)
POTASSIUM SERPL-SCNC: 4.8 MMOL/L (ref 3.5–5.1)
PROTHROMBIN TIME: 14.3 SEC (ref 9–11.1)
SODIUM SERPL-SCNC: 140 MMOL/L (ref 136–145)

## 2019-05-17 PROCEDURE — 74011250636 HC RX REV CODE- 250/636: Performed by: PHYSICIAN ASSISTANT

## 2019-05-17 PROCEDURE — 97116 GAIT TRAINING THERAPY: CPT

## 2019-05-17 PROCEDURE — 74011250637 HC RX REV CODE- 250/637: Performed by: ORTHOPAEDIC SURGERY

## 2019-05-17 PROCEDURE — 97161 PT EVAL LOW COMPLEX 20 MIN: CPT

## 2019-05-17 PROCEDURE — 36415 COLL VENOUS BLD VENIPUNCTURE: CPT

## 2019-05-17 PROCEDURE — 77030012893

## 2019-05-17 PROCEDURE — 74011250637 HC RX REV CODE- 250/637: Performed by: PHYSICIAN ASSISTANT

## 2019-05-17 PROCEDURE — 80048 BASIC METABOLIC PNL TOTAL CA: CPT

## 2019-05-17 PROCEDURE — 85018 HEMOGLOBIN: CPT

## 2019-05-17 PROCEDURE — 97110 THERAPEUTIC EXERCISES: CPT

## 2019-05-17 PROCEDURE — 65270000029 HC RM PRIVATE

## 2019-05-17 PROCEDURE — 85610 PROTHROMBIN TIME: CPT

## 2019-05-17 RX ORDER — OXYCODONE HYDROCHLORIDE 5 MG/1
5-10 TABLET ORAL
Qty: 60 TAB | Refills: 0 | Status: SHIPPED | OUTPATIENT
Start: 2019-05-17 | End: 2019-05-24

## 2019-05-17 RX ORDER — WARFARIN SODIUM 5 MG/1
5 TABLET ORAL
Status: COMPLETED | OUTPATIENT
Start: 2019-05-17 | End: 2019-05-17

## 2019-05-17 RX ADMIN — OXYCODONE HYDROCHLORIDE 5 MG: 5 TABLET ORAL at 11:16

## 2019-05-17 RX ADMIN — ACETAMINOPHEN 650 MG: 325 TABLET ORAL at 14:49

## 2019-05-17 RX ADMIN — ACETAMINOPHEN 650 MG: 325 TABLET ORAL at 09:18

## 2019-05-17 RX ADMIN — LEVOTHYROXINE SODIUM 150 MCG: 150 TABLET ORAL at 05:59

## 2019-05-17 RX ADMIN — FLECAINIDE ACETATE 100 MG: 100 TABLET ORAL at 18:25

## 2019-05-17 RX ADMIN — OXYCODONE HYDROCHLORIDE 5 MG: 5 TABLET ORAL at 18:25

## 2019-05-17 RX ADMIN — CELECOXIB 200 MG: 200 CAPSULE ORAL at 20:47

## 2019-05-17 RX ADMIN — OXYCODONE HYDROCHLORIDE 5 MG: 5 TABLET ORAL at 07:59

## 2019-05-17 RX ADMIN — BENZOCAINE AND MENTHOL 1 LOZENGE: 15; 3.6 LOZENGE ORAL at 20:47

## 2019-05-17 RX ADMIN — SENNOSIDES, DOCUSATE SODIUM 1 TABLET: 50; 8.6 TABLET, FILM COATED ORAL at 18:25

## 2019-05-17 RX ADMIN — FLECAINIDE ACETATE 100 MG: 100 TABLET ORAL at 08:00

## 2019-05-17 RX ADMIN — OXYCODONE HYDROCHLORIDE 5 MG: 5 TABLET ORAL at 14:49

## 2019-05-17 RX ADMIN — WARFARIN SODIUM 5 MG: 5 TABLET ORAL at 10:57

## 2019-05-17 RX ADMIN — POLYETHYLENE GLYCOL 3350 17 G: 17 POWDER, FOR SOLUTION ORAL at 09:18

## 2019-05-17 RX ADMIN — ACETAMINOPHEN 650 MG: 325 TABLET ORAL at 03:38

## 2019-05-17 RX ADMIN — CELECOXIB 200 MG: 200 CAPSULE ORAL at 09:18

## 2019-05-17 RX ADMIN — SODIUM CHLORIDE 125 ML/HR: 900 INJECTION, SOLUTION INTRAVENOUS at 03:37

## 2019-05-17 RX ADMIN — Medication 2 G: at 03:37

## 2019-05-17 RX ADMIN — ACETAMINOPHEN 650 MG: 325 TABLET ORAL at 20:47

## 2019-05-17 RX ADMIN — OXYCODONE HYDROCHLORIDE 5 MG: 5 TABLET ORAL at 21:47

## 2019-05-17 RX ADMIN — Medication 7 ML: at 06:00

## 2019-05-17 RX ADMIN — Medication 10 ML: at 20:48

## 2019-05-17 RX ADMIN — SENNOSIDES, DOCUSATE SODIUM 1 TABLET: 50; 8.6 TABLET, FILM COATED ORAL at 09:18

## 2019-05-17 NOTE — PROGRESS NOTES
Care Management Interventions PCP Verified by CM: Yes 
Palliative Care Criteria Met (RRAT>21 & CHF Dx)?: No 
Mode of Transport at Discharge: Self Transition of Care Consult (CM Consult): Home Health [address]: Yes [facility] Signup: No 
Discharge Durable Medical Equipment: No 
Health Maintenance Reviewed: Yes Physical Therapy Consult: Yes Occupational Therapy Consult: No 
Speech Therapy Consult: No 
Current Support Network: Lives with Spouse Confirm Follow Up Transport: Family Plan discussed with Pt/Family/Caregiver: Yes Freedom of Choice Offered: Yes [employer] Information Provided?: No 
Discharge Location Discharge Placement: Home with home health Reason for Admission:   Left Total Knee Replacement RRAT Score:       10 Plan for utilizing home health:      Yes, first choice [address] (ACO) 2nd choice AT [facility], referral sent via [address] to [address] Current Advanced Directive/Advance Care Plan: full code, [address] on file Likelihood of Readmission:  low Transition of Care Plan:   Home with [place] , spouse will transport home at dc. The patient is doing well with therapy. He will work with them again this afternoon and talk with Dr. Kelli Mccracken regarding discharge today vs tomorrow. SHA      
 
[address] accepted the case.  SHA

## 2019-05-17 NOTE — PROGRESS NOTES
Bedside and Verbal shift change report given to [address] (oncoming nurse) by Sandhya Antoine (offgoing nurse). Report included the following information SBAR, Kardex, Intake/Output, MAR and Recent Results.

## 2019-05-17 NOTE — PROGRESS NOTES
Problem: Mobility Impaired (Adult and Pediatric) Goal: *Acute Goals and Plan of Care (Insert Text) Description Physical Therapy Goals Initiated 5/17/2019 1. Patient will move from supine to sit and sit to supine  and roll side to side in bed with modified independence within 4 days. 2. Patient will perform sit to stand with modified independence within 4 days. 3. Patient will ambulate with modified independence for 150 feet with the least restrictive device within 4 days. 4. Patient will ascend/descend 12 stairs with 1 handrail(s) with modified independence within 4 days. 5. Patient will perform home exercise program per protocol with modified independence within 4 days. 6. Patient will demonstrate AROM 0-90 degrees in operative joint within 4 days. Outcome: Progressing Towards Goal 
 PHYSICAL THERAPY EVALUATION Patient: Triny Villar (57 y.o. male) Date: 5/17/2019 Primary Diagnosis: Primary osteoarthritis of left knee [M17.12] Procedure(s) (LRB): LEFT TOTAL KNEE REPLACEMENT (Left) 1 Day Post-Op Precautions:   Fall, WBAT 
 
ASSESSMENT : 
Based on the objective data described below, the patient presents with decreased mobility and pain after L TKS POD1. He had a considerable flexion contracture prior to surgery and does have some residual tightness in extension. Educated at length about positioning and exercises to promote extension and patient is very engaged. His mobility with the walker is slow but stable and he is able to bear weight on the LLE without difficulty. At home, he will have assist from his wife and will need to be able to manage 3 steps to enter. His bedroom is upstairs, but they have a stair lift if needed. Expect that he will progress well and be able to return home with HHPT as soon as medically cleared. We will follow up this afternoon with stair training. Patient will benefit from skilled intervention to address the above impairments. Patient?s rehabilitation potential is considered to be Good Factors which may influence rehabilitation potential include:  
? None noted ? Mental ability/status ? Medical condition ? Home/family situation and support systems ? Safety awareness 
? Pain tolerance/management 
? Other: PLAN : 
Recommendations and Planned Interventions: 
?           Bed Mobility Training             ? Neuromuscular Re-Education ? Transfer Training                   ? Orthotic/Prosthetic Training 
? Gait Training                         ? Modalities ? Therapeutic Exercises           ? Edema Management/Control ? Therapeutic Activities            ? Patient and Family Training/Education ? Other (comment): Frequency/Duration: Patient will be followed by physical therapy  twice daily to address goals. Discharge Recommendations: Home Health Further Equipment Recommendations for Discharge: none, patient has a RW and cane SUBJECTIVE:  
Patient stated ? I can't believe it doesn't hurt to stand on it anymore. ? OBJECTIVE DATA SUMMARY:  
HISTORY:   
Past Medical History:  
Diagnosis Date Aortic insufficiency Arthritis Atrial fibrillation ([address] Chronic pain Hearing loss 4/1/2010 Hematuria, microscopic 4/1/2010 Hypothyroidism 4/1/2010 On warfarin therapy Prostate CA ([facility] [address] 1992 Rectus diastasis 4/1/2010 Seborrheic dermatitis, unspecified 4/1/2010 Unspecified adverse effect of anesthesia DIFFICULT TO AWAKEN Past Surgical History:  
Procedure Laterality Date ENDOSCOPY, COLON, DIAGNOSTIC  11/19/07  
 w/polypectomy HX GI    
 COLONOSCOPY HX GI    
 RECTAL FISTULA REPAIR  
 HX HEENT  [id number]   
 HX HEENT Right 01/2019 [address] HERNIA REPAIR  2004  
 hernia- HX KNEE ARTHROSCOPY  7/08 MENISCECTOMY LEFT  
 HX KNEE ARTHROSCOPY    
 LEFT AND RIGHT ARTHROSCOPY  
 HX ORTHOPAEDIC  2012 BONE SPUR REMOVAL   
 HX RADICAL PROSTATECTOMY  1994 [address] [facility] Prior Level of Function/Home Situation: independent, lives with wife Personal factors and/or comorbidities impacting plan of care: L TKA, pain, weakness Home Situation Home Environment: Private residence One/Two Story Residence: Two story Living Alone: No 
Support Systems: Spouse/Significant Other/Partner Patient Expects to be Discharged to[de-identified] Private residence Current DME Used/Available at Home: Blood pressure cuff EXAMINATION/PRESENTATION/DECISION MAKING:  
Critical Behavior: 
Neurologic State: Alert Orientation Level: Oriented X4 Cognition: Follows commands Hearing: Auditory Auditory Impairment: Hard of hearing, bilateral 
Skin:  post op dressing in place with minimal serosanguinous drainage noted on aquacel Edema: moderate LLE edema and ecchymosis noted Range Of Motion: 
AROM: Within functional limits(L knee -5 to 80) Strength:   
Strength: Within functional limits(LLE 3/5 due to pain) Tone & Sensation:  
Tone: Normal 
  
  
  
  
Sensation: Intact Coordination: 
Coordination: Within functional limits Vision:  
  
Functional Mobility: 
Bed Mobility: 
  
Supine to Sit: Modified independent Sit to Supine: Modified independent Transfers: 
Sit to Stand: Modified independent; Adaptive equipment; Additional time Stand to Sit: Modified independent; Adaptive equipment; Additional time Balance:  
Sitting: Intact; Without support Standing: Intact; With support Ambulation/Gait Training: 
Distance (ft): 75 Feet (ft)(twice with a seated rest) Assistive Device: Gait belt;Walker, rolling Ambulation - Level of Assistance: Stand-by assistance; Adaptive equipment; Additional time Gait Abnormalities: Antalgic;Decreased step clearance; Step to gait(progressed to step through pattern) Right Side Weight Bearing: Full Left Side Weight Bearing: As tolerated Base of Support: Widened Stance: Right decreased Speed/Adenike: Pace decreased (<100 feet/min) Step Length: Left shortened;Right shortened Swing Pattern: Right asymmetrical 
  
Interventions: Safety awareness training; Tactile cues; Verbal cues; Visual/Demos Stairs: Therapeutic Exercises: All TKA post op exercises Functional Measure: 
Barthel Index: 
Bathin Bladder: 10 Bowels: 10 
Groomin Dressin Feeding: 10 Mobility: 0 Stairs: 0 Toilet Use: 10 Transfer (Bed to Chair and Back): 10 Total: 60/100 Percentage of impairment  
0% 1-19% 20-39% 40-59% 60-79% 80-99% 100% Barthel Score 0-100 100 99-80 79-60 59-40 20-39 1-19 
 0 The Barthel ADL Index: Guidelines 1. The index should be used as a record of what a patient does, not as a record of what a patient could do. 2. The main aim is to establish degree of independence from any help, physical or verbal, however minor and for whatever reason. 3. The need for supervision renders the patient not independent. 4. A patient's performance should be established using the best available evidence. Asking the patient, friends/relatives and nurses are the usual sources, but direct observation and common sense are also important. However direct testing is not needed. 5. Usually the patient's performance over the preceding 24-48 hours is important, but occasionally longer periods will be relevant. 6. Middle categories imply that the patient supplies over 50 per cent of the effort. 7. Use of aids to be independent is allowed. Dawn Larsen., Barthel, D.W. ([id number]). Functional evaluation: the Barthel Index. [address] [facility] (14)2. LYNSEY Roy, Federico [place]., [place] Ey[place].Geraldine, [address] ().  Measuring the change indisability after inpatient rehabilitation; comparison of the responsiveness of the Barthel Index and Functional [place] Measure. Journal of Neurology, Neurosurgery, and Psychiatry, 66(4), 494-366. VARSHA Weir, DEV Kraus, & Emma Rios M.A. (2004.) Assessment of post-stroke quality of life in cost-effectiveness studies: The usefulness of the Barthel Index and the EuroQoL-5D. [facility], 13, 249-54 Physical Therapy Evaluation Charge Determination History Examination Presentation Decision-Making MEDIUM  Complexity : 1-2 comorbidities / personal factors will impact the outcome/ POC  MEDIUM Complexity : 3 Standardized tests and measures addressing body structure, function, activity limitation and / or participation in recreation  LOW Complexity : Stable, uncomplicated  LOW Complexity : FOTO score of  Based on the above components, the patient evaluation is determined to be of the following complexity level: LOW Pain: 
Pain Scale 1: Numeric (0 - 10) Rated a 2/10 after activity Activity Tolerance:  
Good Please refer to the flowsheet for vital signs taken during this treatment. After treatment:  
?         Patient left in no apparent distress sitting up in chair ? Patient left in no apparent distress in bed, ice in place ? Call bell left within reach ? Nursing notified ? Caregiver present ? Bed alarm activated COMMUNICATION/EDUCATION:  
The patient?s plan of care was discussed with: Registered Nurse. ?         Fall prevention education was provided and the patient/caregiver indicated understanding. ? Patient/family have participated as able in goal setting and plan of care. ?         Patient/family agree to work toward stated goals and plan of care. ?         Patient understands intent and goals of therapy, but is neutral about his/her participation. ?          Patient is unable to participate in goal setting and plan of care. 
 
Thank you for this referral. 
Stephany Lowery, PT Time Calculation: 32 mins

## 2019-05-17 NOTE — PROGRESS NOTES
TOTAL KNEE PROGRESS NOTE Subjective:  
 
Post-Operative Day: 1 Status Post left Total Knee Arthroplasty Systemic or Specific Complaints:No Complaints Objective:  
 
Patient Vitals for the past 24 hrs: 
 BP Temp Pulse Resp SpO2  
05/17/19 0334 110/58 98.1 °F (36.7 °C) 60 14 95 % 05/16/19 2311 131/54 98.3 °F (36.8 °C) 69 14 97 % 05/16/19 1830 144/61 98.2 °F (36.8 °C) 71 16 98 % 05/16/19 1721 152/70      
05/16/19 1720 158/72      
05/16/19 1719 151/71 97.6 °F (36.4 °C) 67 16 98 % 05/16/19 1639 133/75 98.2 °F (36.8 °C) 71 16 98 % 05/16/19 1535 121/58 98.2 °F (36.8 °C) 76 16 98 % 05/16/19 1442 168/69 97.4 °F (36.3 °C) 70 15 97 % 05/16/19 1415 148/57  66 16 99 % 05/16/19 1405     100 % 05/16/19 1400 146/55  65 20 100 % 05/16/19 1345 133/48  (!) 56 12 92 % 05/16/19 1330 152/61  64 11 100 % 05/16/19 1325 147/58  60 10 99 % 05/16/19 1320 135/63  64 (!) 0 100 % 05/16/19 1319 145/56 98.6 °F (37 °C) 66  98 % 05/16/19 1008 143/58  65  100 % 05/16/19 0947 126/57  61  98 % 05/16/19 0848 143/57 98.1 °F (36.7 °C) 62 16 98 % General: alert, cooperative, no distress, appears stated age Wound: Wound clean and dry no evidence of infection. , No Erythema, No Edema, No Drainage and Wound Intact Motion: Extension: Full Extension DVT Exam: No evidence of DVT seen on physical exam. 
Negative Salty's sign. No cords or calf tenderness. No significant calf/ankle edema. Data Review:   
Recent Results (from the past 24 hour(s)) GLUCOSE, POC Collection Time: 05/16/19  9:16 AM  
Result Value Ref Range Glucose (POC) 86 65 - 100 mg/dL Performed by Freddy Hanna   
POC INR Collection Time: 05/16/19  9:19 AM  
Result Value Ref Range INR (POC) 1.3 (H) <1.2 PROTHROMBIN TIME + INR Collection Time: 05/17/19  3:43 AM  
Result Value Ref Range INR 1.4 (H) 0.9 - 1.1 Prothrombin time 14.3 (H) 9.0 - 11.1 sec HEMOGLOBIN  
 Collection Time: 05/17/19  3:43 AM  
Result Value Ref Range HGB 11.3 (L) 12.1 - 17.0 g/dL METABOLIC PANEL, BASIC Collection Time: 05/17/19  3:43 AM  
Result Value Ref Range Sodium 140 136 - 145 mmol/L Potassium 4.8 3.5 - 5.1 mmol/L Chloride 108 97 - 108 mmol/L  
 CO2 25 21 - 32 mmol/L Anion gap 7 5 - 15 mmol/L Glucose 137 (H) 65 - 100 mg/dL BUN 28 (H) 6 - 20 MG/DL Creatinine 1.54 (H) 0.70 - 1.30 MG/DL  
 BUN/Creatinine ratio 18 12 - 20 GFR est AA 53 (L) >60 ml/min/1.73m2 GFR est non-AA 44 (L) >60 ml/min/1.73m2 Calcium 7.8 (L) 8.5 - 10.1 MG/DL Assessment:  
 
Status Post left Total Knee Arthroplasty. Doing well postoperatively. . Bandage aquacel, clean/dry. Labs stable. PT progressing well. Pain control WNL. Plan:  
 
Continues current post-op course Continue PT per protocol Plan to discharge to Home [facility]. tomorrow.

## 2019-05-17 NOTE — PROGRESS NOTES
Bedside and Verbal shift change report given to [facility] (oncoming nurse) by Brenton Kim RN (offgoing nurse). Report given with SBAR, Kardex and MAR.

## 2019-05-17 NOTE — OP NOTES
[address]   OPERATIVE REPORT    Name:  Haroldo Saucedo  MR#:  [id number]  :  1940  ACCOUNT #:  [de-identified]  DATE OF SERVICE:  2019    PREOPERATIVE DIAGNOSES:  1. Advanced degenerative arthritis of left knee. 2.  Varus deformity and marked flexion contracture, left knee. POSTOPERATIVE DIAGNOSES:  1. Advanced degenerative arthritis of left knee. 2.  Varus deformity and marked flexion contracture, left knee. PROCEDURE PERFORMED:  Left total knee replacement. SURGEON:  Alejo Tamayo MD    ASSISTANT:  Hannah Mike PA-C    ANESTHESIA:  General.    COMPLICATIONS:  None. SPECIMENS REMOVED:  Tibial and femoral bone fragments. IMPLANTS:  Left total knee components as listed in operative report. ESTIMATED BLOOD LOSS:  200 mL. DRAINS:  None. FINDINGS:  Advanced degenerative arthritis in medial, lateral and patellofemoral joints with marked flexion contracture. INDICATIONS:  The patient has a long history of pain in his left knee. He has failed a long course of conservative treatment and now presents for left total knee replacement. PROCEDURE:  On day of operation, the patient was taken to the operating room. General anesthesia administered. He was placed in supine position. The left lower extremity was prepped and draped in the usual fashion. After exsanguination with an Esmarch tourniquet inflated to 290 mmHg, a midline longitudinal incision was made over the knee and was carried through subcutaneous tissue. A medial parapatellar capsular incision made. Advanced degenerative changes were noted throughout the knee. The knee was debrided of osteophytes and soft tissue. A drill was used to gain access to the femoral canal.  Distal femoral cutting guide was assembled with a 5-degree valgus cut. Distal femoral cut made with an oscillating saw. The femur was sized and felt to be #8 in the Attune system. Anterior-posterior cuts were made. Chamfer cuts were made. Box cut for the posterior stabilized prosthesis made. External tibial alignment guide assembled. Tibial plateau cut was made with an oscillating saw. Flexion and extension gaps evaluated and felt to be appropriate. The tibia was sized and felt to be #8 in the Attune system. Drill and punch were used to prepare the tibial plateau. The patella was prepared with an oscillating saw and sized for 41 mm. Trial components put into place. Some medial release was necessary to provide ligamentous balancing and 5-mm insert provided the best fit, range of motion, with proper tracking of the patella and proper ligamentous balancing. The trial components were then removed. The knee was sterilely irrigated with pulse irrigation as well as antibiotic solution. The components were cemented into place. A 5-mm trial insert put into place. Soft tissues infiltrated with Exparel local anesthetic. After the cement matured, the 5-mm insert was put into place and felt to be stable. Tourniquet released. Coagulation achieved with electrocautery. Capsule repaired with #1 Vicryl suture, supplemented with #2 PDS. A 2-0 Vicryl was used to close the subcutaneous tissue and staples used to close the skin. Sterile dressings applied. The patient taken to the recovery room in satisfactory condition. The assistant, Renetta Castano PA-C, assisted me with positioning, retraction, implant installation, and incision closure. Freya Limon MD      [id number]  D:  05/16/2019 14:03  T:  05/16/2019 14:12  JOB #:  [id number]  CC:   MD Vito Donahue MD

## 2019-05-17 NOTE — DISCHARGE INSTRUCTIONS
DC Orders All Total Knees    Case Management for DC planning to Home HC .   - PT 3 times a week for 2 weeks; WBAT. - PT/INR Every Monday/Thursday for 2 weeks, Call Dr. Andrew Beatty with results ([phone]). - Remove AQUACEL bandage on the 7th day post-operatively (PLEASE reference discharge instructions INCISION CARE for additional information). - Remove staples at 2 weeks post-op; 5/30/19 .   - Follow up in Office in 2 weeks. After Hospital Care Plan:  Discharge Instructions Knee Replacement-Dr. Andrew Beatty    Patient Name: Cynthia Marquez  Date of procedure: 5/16/2019   Procedure: Procedure(s):  LEFT TOTAL KNEE REPLACEMENT  Surgeon: Matteo Ordoñez) and Role:     Matty Bird MD - Primary    PCP: Emre Chavez MD  Date of discharge: No discharge date for patient encounter. Follow up appointments   Follow up with Dr. Andrew Beatty in 2 weeks. Call [phone] to make an appointment.  If home health has been arranged for you the agency will contact you to arrange dates/times for visits. Please call them if you do not hear from them within 24 hours after you are discharged    When to call your Orthopaedic Surgeon: Call [phone].  If you call after 5pm or on a weekend, the on call physician will be contacted   Unrelieved pain   Signs of infection-if your incision is red; continues to have drainage; drainage has a foul odor or if you have a persistent fever over 101 degrees   Signs of a blood clot in your leg-calf pain, tenderness, redness, swelling of lower leg    When to call your Primary Care Physician:   Concerns about medical conditions such as diabetes, high blood pressure, asthma, congestive heart failure   Call if blood sugars are elevated, persistent headache or dizziness, coughing or congestion, constipation or diarrhea, burning with urination, abnormal heart rate    When to call [phone] go to the nearest emergency room   Acute onset of chest pain, shortness of breath, difficulty breathing      Activity   Weight bearing as tolerated with walker or crutches. Refer to pages 23-31 of your handbook for instructions and pictures   Complete your Home Exercise Program daily as instructed by your therapist.  Refer to pages 33-41 of your handbook for instructions and pictures   Get up every one hour and walk (except at night when sleeping)   Do not drive or operate heavy machinery    Incision Care   The Aquacel (brown, waterproof) surgical dressing is to remain on your knee for 7 days. On the 7th day have someone gently peel the dressing off by carefully lifting the edge and stretching it slightly to break the adhesive seal   If the home health agency has not removed the Aquacel bandage by the 7th day please remove it yourself   You will have staples in your knee incision. They will be removed by the home health agency staff   If your Aquacel dressing comes loose/off before the 7th day, you may replace it with a dry sterile gauze dressing; change it daily. Once your incision is not draining, you may leave it open to air   You may take a shower with the Aquacel dressing in place. Once the Aquacel is removed, you may shower and get your incision wet but do not submerge your incision under water in a bath tub, hot tub or swimming pool for 6 weeks after surgery. Preventing blood clots    Take Coumadin as prescribed by Dr. Luigi Valdes for one month following surgery   Wear elastic stockings (TEDS) for 4 weeks.   You should remove them for approximately 1 hour daily for showering/sponge bathing    Pain management   Keep ice wrap in place except when walking; changing gel packs every 4 hours   Lie down and elevate your leg on pillows for about 30 minutes after walking to decrease swelling and pain    Do ankle pumps (10 repetitions) every hour while awake and get up frequently to walk    Take Tylenol 500mg every 6 hours for 2 weeks    Take narcotic pain pill as prescribed if needed. Take with food; avoid alcohol while taking pain medication. Decrease the amount of narcotic pain medication as your pain lessens     Diet   Resume usual diet; drink plenty of fluids; eat foods high in fiber   You may want to take a stool softener (such as Senokot-S or Colace) to prevent constipation while you are taking pain medication.   If constipation occurs, take a laxative (such as Dulcolax tablets, Milk of Magnesia, or a suppository)

## 2019-05-18 VITALS
HEART RATE: 71 BPM | DIASTOLIC BLOOD PRESSURE: 53 MMHG | SYSTOLIC BLOOD PRESSURE: 128 MMHG | OXYGEN SATURATION: 94 % | RESPIRATION RATE: 16 BRPM | TEMPERATURE: 97.4 F

## 2019-05-18 LAB
INR PPP: 1.8 (ref 0.9–1.1)
PROTHROMBIN TIME: 17.9 SEC (ref 9–11.1)

## 2019-05-18 PROCEDURE — 97116 GAIT TRAINING THERAPY: CPT

## 2019-05-18 PROCEDURE — 36415 COLL VENOUS BLD VENIPUNCTURE: CPT

## 2019-05-18 PROCEDURE — 97110 THERAPEUTIC EXERCISES: CPT

## 2019-05-18 PROCEDURE — 77030012893

## 2019-05-18 PROCEDURE — 85610 PROTHROMBIN TIME: CPT

## 2019-05-18 PROCEDURE — 74011250637 HC RX REV CODE- 250/637: Performed by: ORTHOPAEDIC SURGERY

## 2019-05-18 PROCEDURE — 74011250637 HC RX REV CODE- 250/637: Performed by: PHYSICIAN ASSISTANT

## 2019-05-18 RX ADMIN — ACETAMINOPHEN 650 MG: 325 TABLET ORAL at 04:17

## 2019-05-18 RX ADMIN — OXYCODONE HYDROCHLORIDE 5 MG: 5 TABLET ORAL at 10:53

## 2019-05-18 RX ADMIN — Medication 8 ML: at 06:00

## 2019-05-18 RX ADMIN — FLECAINIDE ACETATE 100 MG: 100 TABLET ORAL at 08:58

## 2019-05-18 RX ADMIN — CELECOXIB 200 MG: 200 CAPSULE ORAL at 08:58

## 2019-05-18 RX ADMIN — ACETAMINOPHEN 650 MG: 325 TABLET ORAL at 08:58

## 2019-05-18 RX ADMIN — OXYCODONE HYDROCHLORIDE 5 MG: 5 TABLET ORAL at 07:06

## 2019-05-18 RX ADMIN — SENNOSIDES, DOCUSATE SODIUM 1 TABLET: 50; 8.6 TABLET, FILM COATED ORAL at 08:58

## 2019-05-18 RX ADMIN — POLYETHYLENE GLYCOL 3350 17 G: 17 POWDER, FOR SOLUTION ORAL at 08:58

## 2019-05-18 RX ADMIN — WARFARIN SODIUM 7 MG: 5 TABLET ORAL at 11:38

## 2019-05-18 RX ADMIN — OXYCODONE HYDROCHLORIDE 5 MG: 5 TABLET ORAL at 00:45

## 2019-05-18 RX ADMIN — LEVOTHYROXINE SODIUM 150 MCG: 150 TABLET ORAL at 07:06

## 2019-05-18 RX ADMIN — OXYCODONE HYDROCHLORIDE 5 MG: 5 TABLET ORAL at 04:17

## 2019-05-18 NOTE — PROGRESS NOTES
Problem: Mobility Impaired (Adult and Pediatric) Goal: *Acute Goals and Plan of Care (Insert Text) Description Physical Therapy Goals Initiated 5/17/2019 1. Patient will move from supine to sit and sit to supine  and roll side to side in bed with modified independence within 4 days. 2. Patient will perform sit to stand with modified independence within 4 days. 3. Patient will ambulate with modified independence for 150 feet with the least restrictive device within 4 days. 4. Patient will ascend/descend 12 stairs with 1 handrail(s) with modified independence within 4 days. 5. Patient will perform home exercise program per protocol with modified independence within 4 days. 6. Patient will demonstrate AROM 0-90 degrees in operative joint within 4 days. Outcome: Resolved/Met PHYSICAL THERAPY TREATMENT/DISCHARGE Patient: Joel Roman (59 y.o. male) Date: 5/18/2019 Diagnosis: Primary osteoarthritis of left knee [M17.12] Primary localized osteoarthritis of left knee Procedure(s) (LRB): LEFT TOTAL KNEE REPLACEMENT (Left) 2 Days Post-Op Precautions: Fall, WBAT Chart, physical therapy assessment, plan of care and goals were reviewed. ASSESSMENT: 
Patient reports being fatigued this morning, but overall good pain control and without complaints. He was able to ambulate with the RW with a reciprocating gait without assistance. Reviewed stair management and he was able to asc/desc stairs without difficulty. He also performed post op TKA exercises in bed effectively. Edema is moderate and discussed with patient methods to manage edema and activity recommendations and restrictions. He is clear for D/C home from a PT standpoint and RN is aware. Progression toward goals: 
?          Improving appropriately and progressing toward goals ? Improving slowly and progressing toward goals ? Not making progress toward goals and plan of care will be adjusted PLAN: 
 Patient will be discharged from acute skilled physical therapy at this time. Rationale for discharge: 
?     Goals Achieved ? Paola Salinas ? Patient not participating in therapy ? Other: 
Discharge Recommendations:  Home Health Further Equipment Recommendations for Discharge:  none SUBJECTIVE:  
Patient stated ? I am just really tired this morning. ? OBJECTIVE DATA SUMMARY:  
Critical Behavior: 
Neurologic State: Alert, Appropriate for age Orientation Level: Appropriate for age, Oriented X4 Cognition: Appropriate decision making, Appropriate for age attention/concentration, Appropriate safety awareness, Follows commands Range of Motion: 
AROM: (L knee -5 to 80, limited by pain) Functional Mobility Training: 
Bed Mobility: 
  
Supine to Sit: Modified independent Sit to Supine: Modified independent Scooting: Modified independent Transfers: 
Sit to Stand: Modified independent; Adaptive equipment; Additional time Stand to Sit: Modified independent; Adaptive equipment; Additional time Bed to Chair: Modified independent; Adaptive equipment; Additional time Balance: 
Sitting: Intact; Without support Standing: Intact; With support Ambulation/Gait Training: 
Distance (ft): 200 Feet (ft) Assistive Device: Gait belt;Walker, rolling Ambulation - Level of Assistance: Modified independent; Adaptive equipment; Additional time Gait Abnormalities: Antalgic;Decreased step clearance; Step to gait(able to progress to step through gait) Right Side Weight Bearing: Full Left Side Weight Bearing: As tolerated Base of Support: Widened Stance: Left decreased Speed/Adenike: Pace decreased (<100 feet/min) Step Length: Left shortened;Right shortened Swing Pattern: Left asymmetrical 
  
Interventions: Safety awareness training; Tactile cues; Verbal cues; Visual/Demos Stairs: 
Number of Stairs Trained: 4 Stairs - Level of Assistance: Modified independent; Adaptive equipment; Additional time Rail Use: Left (plus cane) Therapeutic Exercises:  
 
EXERCISE Sets Reps Active Active Assist  
Passive Self ROM Comments Ankle Pumps   ? ?                                           ?                                           ?                                             
Quad Sets   ? ?                                           ?                                           ?                                             
Hamstring Sets   ? ?                                           ?                                           ?                                             
Short Arc Quads   ? ?                                           ?                                           ?                                             
Knee Extension Stretch ? ?                                            
?                                            
?                                             
Heel Slides   ? ?                                           ?                                           ?                                             
Long Arc Quads   ? ?                                           ?                                           ?                                             
Knee Flexion Stretch   ? ?                                           ?                                           ?                                             
Straight Leg Raises   ?                                            ?                                           ? ?                                             
 
 
Pain: 
Pain Scale 1: Numeric (0 - 10) Pain Intensity 1: 5 Pain Location 1: Knee Pain Orientation 1: Left Pain Description 1: Aching Pain Intervention(s) 1: Medication (see MAR); Cold pack Activity Tolerance:  
Good Please refer to the flowsheet for vital signs taken during this treatment. After treatment:  
?  Patient left in no apparent distress sitting up in chair ? Patient left in no apparent distress in bed 
? Call bell left within reach ? Nursing notified ? Caregiver present ? Bed alarm activated COMMUNICATION/COLLABORATION:  
The patient?s plan of care was discussed with: Registered Nurse Eleanor Turcios, PT Time Calculation: 45 mins

## 2019-05-18 NOTE — PROGRESS NOTES
Bedside and Verbal shift change report given to Italia Puente (oncoming nurse) by Wen Barrios RN (offgoing nurse). Report given with Anatoliy WEI and MAR.

## 2019-05-18 NOTE — PROGRESS NOTES
Sustained a skin tear to lt hand during the process of removing tape . Affected area cleansed with ns and xeroform applied. Md will be notified by day shift nurse.

## 2019-05-18 NOTE — PROGRESS NOTES
The Joint Replacement Discharge Education video was reviewed by the patient/family. The content was discussed utilizing teach back, questions were answered. The patient verbalized an understanding of the instructions. I have reviewed discharge instructions with the patient and spouse. The patient and spouse verbalized understanding.

## 2019-05-19 ENCOUNTER — HOME CARE VISIT (OUTPATIENT)
Dept: SCHEDULING | Facility: HOME HEALTH | Age: 79
End: 2019-05-19
Payer: MEDICARE

## 2019-05-19 VITALS
TEMPERATURE: 98.8 F | DIASTOLIC BLOOD PRESSURE: 60 MMHG | OXYGEN SATURATION: 98 % | WEIGHT: 200 LBS | HEART RATE: 75 BPM | RESPIRATION RATE: 18 BRPM | HEIGHT: 69 IN | SYSTOLIC BLOOD PRESSURE: 122 MMHG | BODY MASS INDEX: 29.62 KG/M2

## 2019-05-19 VITALS
TEMPERATURE: 97.2 F | DIASTOLIC BLOOD PRESSURE: 60 MMHG | HEART RATE: 97 BPM | RESPIRATION RATE: 16 BRPM | SYSTOLIC BLOOD PRESSURE: 140 MMHG | OXYGEN SATURATION: 98 %

## 2019-05-19 PROCEDURE — G0299 HHS/HOSPICE OF RN EA 15 MIN: HCPCS

## 2019-05-19 PROCEDURE — 400013 HH SOC

## 2019-05-19 PROCEDURE — G0151 HHCP-SERV OF PT,EA 15 MIN: HCPCS

## 2019-05-19 PROCEDURE — 3331090002 HH PPS REVENUE DEBIT

## 2019-05-19 PROCEDURE — 3331090001 HH PPS REVENUE CREDIT

## 2019-05-20 ENCOUNTER — HOME CARE VISIT (OUTPATIENT)
Dept: SCHEDULING | Facility: HOME HEALTH | Age: 79
End: 2019-05-20
Payer: MEDICARE

## 2019-05-20 ENCOUNTER — HOME HEALTH ADMISSION (OUTPATIENT)
Dept: HOME HEALTH SERVICES | Facility: HOME HEALTH | Age: 79
End: 2019-05-20

## 2019-05-20 ENCOUNTER — PATIENT OUTREACH (OUTPATIENT)
Dept: FAMILY MEDICINE CLINIC | Age: 79
End: 2019-05-20

## 2019-05-20 VITALS
TEMPERATURE: 98.9 F | HEART RATE: 72 BPM | OXYGEN SATURATION: 98 % | DIASTOLIC BLOOD PRESSURE: 52 MMHG | SYSTOLIC BLOOD PRESSURE: 118 MMHG | RESPIRATION RATE: 18 BRPM

## 2019-05-20 LAB
INR BLD: 1.5 (ref 0.9–1.1)
PT POC: 18.4 SECONDS (ref 11.8–14.9)

## 2019-05-20 PROCEDURE — 3331090001 HH PPS REVENUE CREDIT

## 2019-05-20 PROCEDURE — 3331090002 HH PPS REVENUE DEBIT

## 2019-05-20 PROCEDURE — G0300 HHS/HOSPICE OF LPN EA 15 MIN: HCPCS

## 2019-05-20 NOTE — PROGRESS NOTES
[facility] Discharge Follow-Up Date/Time:  2019 1:22 PM 
 
Patient was admitted to [facility] on 19 and discharged on 19 for DJD of left knee . The physician discharge summary was available at the time of outreach. Patient was contacted within 1 business days of discharge. Top Challenges reviewed with the provider [address] [place] [address] admit for DJD of left knee -19-for scheduled procedure · 19-LEFT TOTAL KNEE REPLACEMENT (Left Knee) · Home with GERALDINE POZO [facility] for OT/PT/nursing · Monitor INR-on coumadin- labs by [facility] nurse Method of communication with provider : staff message, phone Inpatient RRAT score: 10 Was this a readmission? no  
Patient stated reason for the readmission: n/a Nurse Navigator (NN) contacted the patient by telephone to perform post hospital discharge assessment. Verified name and  with patient as identifiers. Provided introduction to self, and explanation of the Nurse Navigator role. Reviewed discharge instructions and red flags with patient who verbalized understanding. Patient given an opportunity to ask questions and does not have any further questions or concerns at this time. The patient agrees to contact the PCP office for questions related to their healthcare. NN provided contact information for future reference. Disease Specific:   N/A Summary of patient's top problems: 1. LEFT TOTAL KNEE REPLACEMENT (Left Knee)- scheduled procedure. Being followed by [facility] Home Health orders at discharge: PT/OT/Nursing Home Health company: [facility] Date of initial visit: 19 Durable Medical Equipment ordered/company: none Durable Medical Equipment received: none Barriers to care? None verbalized Advance Care Planning:  
Does patient have an Advance Directive:  reviewed and current Medication(s):  
New Medications at Discharge: Oxycodone Changed Medications at Discharge: none Discontinued Medications at Discharge: none Medication reconciliation was performed with patient, who verbalizes understanding of administration of home medications. There were no barriers to obtaining medications identified at this time. Referral to Pharm D needed: no  
 
Current Outpatient Medications Medication Sig  
 oxyCODONE IR (ROXICODONE) 5 mg immediate release tablet Take 1-2 Tabs by mouth every four (4) hours as needed for Pain for up to 7 days. Max Daily Amount: 60 mg.  
 warfarin (COUMADIN) 5 mg tablet Take 5 mg by mouth. Indications: Efra Vines  warfarin (COUMADIN) 7.5 mg tablet Take 7.5 mg by mouth. Indications: MONDAYS, WEDNESDAYS, THURSDAYS ,[address] AND SUNDAYS  acetaminophen (TYLENOL) 500 mg tablet Take 500 mg by mouth every six (6) hours as needed for Pain.  levothyroxine (SYNTHROID) 150 mcg tablet TAKE 1 TABLET BEFORE BREAKFAST  multivitamin (ONE A DAY) tablet Take 1 Tab by mouth daily.  OTHER Take 2 Tabs by mouth every evening. fibercon/calciun  flecainide (TAMBOCOR) 100 mg tablet Take 100 mg by mouth two (2) times a day.  ASCORBIC ACID (VITAMIN C PO) Take 500 mg by mouth daily (after breakfast). No current facility-administered medications for this visit. There are no discontinued medications. [facility] follow up appointment(s):  
Future Appointments Date Time Provider Reynold Waterman 5/21/2019  1:30 PM Ada Carson PT [facility]  
5/23/2019 To Be Determined Briana Zee RN [address] [place], [place] [address]Materia  
5/24/2019 11:30 AM Ada Carson PT [facility]  
5/27/2019 To Be Determined Tammy Caballero [facility]  
5/27/2019 To Be Determined Renata Lamb LPN [facility]  
5/29/2019 To Be Determined Tammy Caballero [facility]  
5/30/2019 To Be Determined Briana Zee RN [facility]  
5/31/2019 To Be Determined Ada Carson  [place] [id number]  
8/1/2019  9:00 AM Isabell Greene MD [address] [place] [address]  
 1/30/2020 10:30 AM Antoine Ramos  Kathy Mann [facility] follow up appointment(s): none [facility]:  offered and patient declined, declined PCP follow up at this time. pk 
 
 
Goals  Returns to baseline activity level. 05/20/19 · Reviewed discharge instructions · Medication reconciliation completed · [address] to follow for PT/nursing · Decline PCP appointment at this time. · NN will follow in one week. pk 
  
  Understands red flags post discharge. 05/20/19 · Review red flags: ? Increased pain, swelling, warmth, or redness. ? Red streaks leading from the incision. ? Pus draining from the incision. ? A fever. · You have signs of a blood clot, such as: 
? Pain in your calf, back of the knee, thigh, or groin. ? Redness and swelling in your leg or groin. · Your incision comes open and begins to bleed, or the bleeding increases. · You have pain that does not get better after you take pain medicine NN will follow in one week.  pk

## 2019-05-20 NOTE — DISCHARGE SUMMARY
Discharge Summary    Physician: Anu Herbert MD    Physician Assistant: Pedro Cruz PA-C    Admit Diagnosis:  Primary osteoarthritis of left knee [M17.12]    Final Diagnosis:   1. Advanced degenerative arthritis of left knee . Procedure: Left total knee replacement    Complications: None. History of Present Illness: The patient has a long-standing history of pain within the left knee . The patient has severe degenerative arthritis of the left knee and has exhausted conservative therapy at this time including prior intra-articular injections, activity modifications, OTC NSAIDS. The patient has been limited in their ability to perform ADLs, walk short distances or climb steps appropriately. The patient presents for the above-mentioned procedure. The patient has been cleared from a medical and cardiac standpoint. Past Medical History:  Recorded in the chart. Physical Examination: Also recorded in the chart. The patient is noted for ambulating with an antalgic gait favoring the left side. Examination of the left knee reveals a lack of 3 degrees to full extension with flexion to 117 degrees. Diffuse tenderness and crepitus with ROM, more so medially. Synovial hypertrophy. The cruciate and collateral ligaments are stable. No effusion. No sign of infection. No ecchymosis or erythema. No cellulitis or rash. No calf pain, no evidence of DVT. I detect no obvious motor or sensory deficits in the lower extremities. The extensor mechanism is intact. The neurovascular status is intact . X-rays reveal marked degenerative changes of the left knee diffusely. Course in the Hospital:  The patient was admitted to the hospital.  The Pt. Underwent a left total knee replacement . Postoperatively, the pt. did well. The pt. Remained stable from a medical standpoint. The patient ambulated, WBAT weightbearing within the hospital with Physical Therapy. The patient continued with this therapy at [facility] with PT.   The patient began taking Coumadin pre-operatively for DVT prophylaxis and will continue on this for one month. At the time of discharge, there was no evidence of any DVT noted. The patient had negative Homans signs bilateral lower extremities. At the time of discharge, the patient's left knee incision appeared with staples intact. No signs of infection or inflammation noted. The patient will follow up in our office in 2-1/2 weeks. DC med list:  Discharge Medication List as of 5/18/2019 10:47 AM      START taking these medications    Details   oxyCODONE IR (ROXICODONE) 5 mg immediate release tablet Take 1-2 Tabs by mouth every four (4) hours as needed for Pain for up to 7 days. Max Daily Amount: 60 mg., Print, Disp-60 Tab, R-0         CONTINUE these medications which have NOT CHANGED    Details   !! warfarin (COUMADIN) 5 mg tablet Take 5 mg by mouth. Indications: TUESDAYS AND FRIDAYS, Historical Med      !! warfarin (COUMADIN) 7.5 mg tablet Take 7.5 mg by mouth. Indications: MONDAYS, WEDNESDAYS, THURSDAYS ,SATURDAYS AND SUNDAYS, Historical Med      acetaminophen (TYLENOL) 500 mg tablet Take 500 mg by mouth every six (6) hours as needed for Pain., Historical Med      levothyroxine (SYNTHROID) 150 mcg tablet TAKE 1 TABLET BEFORE BREAKFAST, Normal, Disp-90 Tab, R-1      multivitamin (ONE A DAY) tablet Take 1 Tab by mouth daily. , Historical Med      OTHER Take 2 Tabs by mouth every evening. fibercon/calciun , Historical Med      flecainide (TAMBOCOR) 100 mg tablet Take 100 mg by mouth two (2) times a day., Historical Med      ASCORBIC ACID (VITAMIN C PO) Take 500 mg by mouth daily (after breakfast). , Historical Med       !! - Potential duplicate medications found. Please discuss with provider.           Patient Disposition: Home  Followup Care:  Discharge Condition: good  Activity as tolerated  Regular Diet  As directed    Follow-up Information     Follow up With Specialties Details Why Contact Info    Jakob Jimenez Karli Gleason MD [facility]   [address] [place] [id number]  [address] [place] [address]                  Lorelei Chapman PA-C

## 2019-05-21 ENCOUNTER — HOME CARE VISIT (OUTPATIENT)
Dept: SCHEDULING | Facility: HOME HEALTH | Age: 79
End: 2019-05-21
Payer: MEDICARE

## 2019-05-21 VITALS
DIASTOLIC BLOOD PRESSURE: 80 MMHG | TEMPERATURE: 98 F | SYSTOLIC BLOOD PRESSURE: 130 MMHG | RESPIRATION RATE: 16 BRPM | OXYGEN SATURATION: 96 % | HEART RATE: 74 BPM

## 2019-05-21 PROCEDURE — G0151 HHCP-SERV OF PT,EA 15 MIN: HCPCS

## 2019-05-21 PROCEDURE — 3331090002 HH PPS REVENUE DEBIT

## 2019-05-21 PROCEDURE — 3331090001 HH PPS REVENUE CREDIT

## 2019-05-22 PROCEDURE — 3331090002 HH PPS REVENUE DEBIT

## 2019-05-22 PROCEDURE — 3331090001 HH PPS REVENUE CREDIT

## 2019-05-23 ENCOUNTER — HOME CARE VISIT (OUTPATIENT)
Dept: SCHEDULING | Facility: HOME HEALTH | Age: 79
End: 2019-05-23
Payer: MEDICARE

## 2019-05-23 ENCOUNTER — PATIENT OUTREACH (OUTPATIENT)
Dept: CASE MANAGEMENT | Age: 79
End: 2019-05-23

## 2019-05-23 ENCOUNTER — HOME CARE VISIT (OUTPATIENT)
Dept: HOME HEALTH SERVICES | Facility: HOME HEALTH | Age: 79
End: 2019-05-23
Payer: MEDICARE

## 2019-05-23 VITALS
TEMPERATURE: 99.1 F | HEART RATE: 75 BPM | SYSTOLIC BLOOD PRESSURE: 132 MMHG | OXYGEN SATURATION: 99 % | RESPIRATION RATE: 18 BRPM | DIASTOLIC BLOOD PRESSURE: 64 MMHG

## 2019-05-23 LAB
INR BLD: 1.8 (ref 0.9–1.1)
PT POC: 22 SECONDS (ref 11.8–14.9)

## 2019-05-23 PROCEDURE — G0151 HHCP-SERV OF PT,EA 15 MIN: HCPCS

## 2019-05-23 PROCEDURE — 3331090001 HH PPS REVENUE CREDIT

## 2019-05-23 PROCEDURE — G0299 HHS/HOSPICE OF RN EA 15 MIN: HCPCS

## 2019-05-23 PROCEDURE — 3331090002 HH PPS REVENUE DEBIT

## 2019-05-23 NOTE — PROGRESS NOTES
This note will not be viewable in [address] Post Discharge Follow-up contact after Joint Replacement    Patient discharged on 5/18/19  By  León Britt   following  left knee Arthroplasty. Spoke with patient today, who reports they are \" doing well according to my home health PT, and they have been great. \"  Denies Fever, Shortness of Breath or Chest Pain. Home Health has visited. Patient also reports:. Incision  clean, dry, intact. Aquacel has been removed and staples to be removed next week. Calf is non-tender,   operative extremity has minimal swelling. Pain is well managed with Tylenol only. Discussed use of ice & elevation. Patient is progressing with therapy and is exercising independently. Taking Coumadin for anticoagulation, Tylenol for pain. Patient is not experiencing symptoms of constipation & urinating without difficulty. Discussed side effects of anticoagulants & pain medications (bleeding/bruising, constipation, lightheaded/dizziness)  Follow up appointment is scheduled for next week. Discussed calling surgeon Dr Earl Rondon  for drainage, bleeding, swelling in operative extremity, fever or pain. Discussed calling PCP Dr Evelio Riley with other medical issues.

## 2019-05-24 ENCOUNTER — HOME CARE VISIT (OUTPATIENT)
Dept: HOME HEALTH SERVICES | Facility: HOME HEALTH | Age: 79
End: 2019-05-24
Payer: MEDICARE

## 2019-05-24 VITALS
RESPIRATION RATE: 16 BRPM | OXYGEN SATURATION: 97 % | TEMPERATURE: 98 F | DIASTOLIC BLOOD PRESSURE: 80 MMHG | SYSTOLIC BLOOD PRESSURE: 130 MMHG | HEART RATE: 75 BPM

## 2019-05-24 PROCEDURE — 3331090001 HH PPS REVENUE CREDIT

## 2019-05-24 PROCEDURE — 3331090002 HH PPS REVENUE DEBIT

## 2019-05-25 PROCEDURE — 3331090002 HH PPS REVENUE DEBIT

## 2019-05-25 PROCEDURE — 3331090001 HH PPS REVENUE CREDIT

## 2019-05-26 PROCEDURE — 3331090002 HH PPS REVENUE DEBIT

## 2019-05-26 PROCEDURE — 3331090001 HH PPS REVENUE CREDIT

## 2019-05-27 ENCOUNTER — HOME CARE VISIT (OUTPATIENT)
Dept: SCHEDULING | Facility: HOME HEALTH | Age: 79
End: 2019-05-27
Payer: MEDICARE

## 2019-05-27 VITALS
DIASTOLIC BLOOD PRESSURE: 71 MMHG | OXYGEN SATURATION: 97 % | RESPIRATION RATE: 16 BRPM | SYSTOLIC BLOOD PRESSURE: 132 MMHG | TEMPERATURE: 98 F | HEART RATE: 72 BPM

## 2019-05-27 VITALS
TEMPERATURE: 98.9 F | RESPIRATION RATE: 18 BRPM | OXYGEN SATURATION: 97 % | DIASTOLIC BLOOD PRESSURE: 61 MMHG | SYSTOLIC BLOOD PRESSURE: 132 MMHG | HEART RATE: 69 BPM

## 2019-05-27 PROCEDURE — 3331090002 HH PPS REVENUE DEBIT

## 2019-05-27 PROCEDURE — G0151 HHCP-SERV OF PT,EA 15 MIN: HCPCS

## 2019-05-27 PROCEDURE — G0299 HHS/HOSPICE OF RN EA 15 MIN: HCPCS

## 2019-05-27 PROCEDURE — 3331090001 HH PPS REVENUE CREDIT

## 2019-05-28 PROCEDURE — 3331090002 HH PPS REVENUE DEBIT

## 2019-05-28 PROCEDURE — 3331090001 HH PPS REVENUE CREDIT

## 2019-05-29 ENCOUNTER — HOME CARE VISIT (OUTPATIENT)
Dept: SCHEDULING | Facility: HOME HEALTH | Age: 79
End: 2019-05-29
Payer: MEDICARE

## 2019-05-29 PROCEDURE — 3331090001 HH PPS REVENUE CREDIT

## 2019-05-29 PROCEDURE — 3331090002 HH PPS REVENUE DEBIT

## 2019-05-29 PROCEDURE — G0151 HHCP-SERV OF PT,EA 15 MIN: HCPCS

## 2019-05-30 ENCOUNTER — HOME CARE VISIT (OUTPATIENT)
Dept: SCHEDULING | Facility: HOME HEALTH | Age: 79
End: 2019-05-30
Payer: MEDICARE

## 2019-05-30 VITALS
DIASTOLIC BLOOD PRESSURE: 70 MMHG | OXYGEN SATURATION: 97 % | SYSTOLIC BLOOD PRESSURE: 120 MMHG | HEART RATE: 70 BPM | RESPIRATION RATE: 16 BRPM | TEMPERATURE: 98 F

## 2019-05-30 VITALS
RESPIRATION RATE: 12 BRPM | SYSTOLIC BLOOD PRESSURE: 122 MMHG | OXYGEN SATURATION: 98 % | HEART RATE: 79 BPM | TEMPERATURE: 98.8 F | DIASTOLIC BLOOD PRESSURE: 64 MMHG

## 2019-05-30 LAB
INR BLD: 2 (ref 0.9–1.1)
PT POC: 24.4 SECONDS (ref 11.8–14.9)

## 2019-05-30 PROCEDURE — G0151 HHCP-SERV OF PT,EA 15 MIN: HCPCS

## 2019-05-30 PROCEDURE — 3331090001 HH PPS REVENUE CREDIT

## 2019-05-30 PROCEDURE — 3331090002 HH PPS REVENUE DEBIT

## 2019-05-30 PROCEDURE — G0299 HHS/HOSPICE OF RN EA 15 MIN: HCPCS

## 2019-05-31 VITALS
OXYGEN SATURATION: 97 % | HEART RATE: 70 BPM | DIASTOLIC BLOOD PRESSURE: 70 MMHG | SYSTOLIC BLOOD PRESSURE: 130 MMHG | RESPIRATION RATE: 16 BRPM | TEMPERATURE: 98 F

## 2019-05-31 PROCEDURE — 3331090002 HH PPS REVENUE DEBIT

## 2019-05-31 PROCEDURE — 3331090001 HH PPS REVENUE CREDIT

## 2019-06-01 PROCEDURE — 3331090002 HH PPS REVENUE DEBIT

## 2019-06-01 PROCEDURE — 3331090001 HH PPS REVENUE CREDIT

## 2019-06-02 PROCEDURE — 3331090002 HH PPS REVENUE DEBIT

## 2019-06-02 PROCEDURE — 3331090001 HH PPS REVENUE CREDIT

## 2019-06-05 ENCOUNTER — PATIENT OUTREACH (OUTPATIENT)
Dept: FAMILY MEDICINE CLINIC | Age: 79
End: 2019-06-05

## 2019-06-05 NOTE — PROGRESS NOTES
ANTHONY Follow-up assessment: [facility] admit for DJD of left knee 5/16-5/18/19  Outbound call made to patient today to complete ANTHONY follow up assessment. Patient verified by 3 identifiers. Patient had Post -op follow up on 5/31/19. Patient continue to have limitations of left knee. Per Thom Ma, DPT :   Left knee pain, unspecified chronicity (Primary Dx); History of total knee replacement, left;   Decreased range of motion of left knee;   Left leg weakness;   Gait abnormality. Therapy referral sent. Follow-up appointments reviewed, and medication reconciliation completed. NN contact information given for questions and concerns. Current Outpatient Medications:     docusate sodium (COLACE) 50 mg capsule, Take 50 mg by mouth two (2) times daily as needed for Constipation. , Disp: , Rfl:     warfarin (COUMADIN) 5 mg tablet, Take 5 mg by mouth. Indications: TUESDAYS AND FRIDAYS, Disp: , Rfl:     warfarin (COUMADIN) 7.5 mg tablet, Take 7.5 mg by mouth. Indications: MONDAYS, WEDNESDAYS, THURSDAYS ,SATURDAYS AND SUNDAYS, Disp: , Rfl:     acetaminophen (TYLENOL) 500 mg tablet, Take 500 mg by mouth every six (6) hours as needed for Pain., Disp: , Rfl:     levothyroxine (SYNTHROID) 150 mcg tablet, TAKE 1 TABLET BEFORE BREAKFAST (Patient taking differently: Take 150 mcg by mouth Daily (before breakfast). TAKE 1 TABLET BEFORE BREAKFAST), Disp: 90 Tab, Rfl: 1    multivitamin (ONE A DAY) tablet, Take 1 Tab by mouth daily. , Disp: , Rfl:     OTHER, Take 2 Tabs by mouth every evening. fibercon/calciun , Disp: , Rfl:     flecainide (TAMBOCOR) 100 mg tablet, Take 100 mg by mouth two (2) times a day., Disp: , Rfl:     ASCORBIC ACID (VITAMIN C PO), Take 500 mg by mouth daily (after breakfast). , Disp: , Rfl:       Goals Addressed                 This Visit's Progress     Returns to baseline activity level.    On track     05/20/19  · Reviewed discharge instructions  · Medication reconciliation completed  · [facility] to follow for PT/nursing  · Decline PCP appointment at this time. · NN will follow in one week. pk    06/05/19  · Had Post-op follow up  · Still not at baseline  · New referral for PT/OT with GERALDINE BREWSTER [facility]  · Continue to decline need for PCP appointment  · Coumadin management by Aidan Barry MD  · NN will follow in one week. pk       Understands red flags post discharge. On track     05/20/19   · Review red flags:  ? Increased pain, swelling, warmth, or redness. ? Red streaks leading from the incision. ? Pus draining from the incision. ? A fever. · You have signs of a blood clot, such as:  ? Pain in your calf, back of the knee, thigh, or groin. ? Redness and swelling in your leg or groin. · Your incision comes open and begins to bleed, or the bleeding increases. · You have pain that does not get better after you take pain medicine  NN will follow in one week. pk          Case management Plan:  NN will continue to attempt contacts with patient by telephone or during office visit within next 7-10 days.  Will continue to follow as necessary for the remaining 30 days post visit and will reassess for needs before discharge

## 2019-06-24 ENCOUNTER — PATIENT OUTREACH (OUTPATIENT)
Dept: FAMILY MEDICINE CLINIC | Age: 79
End: 2019-06-24

## 2019-06-24 NOTE — PROGRESS NOTES
.  Patient has graduated from the Transitions of Care Coordination  program on 6/24/19. Patient's symptoms are stable at this time. Patient/family has the ability to self-manage. Care management goals have been completed at this time. No further nurse navigator follow up scheduled. Goals Addressed     None          Pt has nurse navigator's contact information for any further questions, concerns, or needs.   Patients upcoming visits:    Future Appointments   Date Time Provider Reynold Waterman   8/1/2019  9:00 AM Leanne Dao MD [facility]   1/30/2020 10:30 AM Leanne Dao MD [address] [place] [address]

## 2019-08-01 ENCOUNTER — OFFICE VISIT (OUTPATIENT)
Dept: FAMILY MEDICINE CLINIC | Age: 79
End: 2019-08-01

## 2019-08-01 ENCOUNTER — HOSPITAL ENCOUNTER (OUTPATIENT)
Dept: LAB | Age: 79
Discharge: HOME | End: 2019-08-01
Payer: MEDICARE

## 2019-08-01 VITALS
DIASTOLIC BLOOD PRESSURE: 50 MMHG | RESPIRATION RATE: 18 BRPM | BODY MASS INDEX: 27.4 KG/M2 | OXYGEN SATURATION: 94 % | HEIGHT: 69 IN | HEART RATE: 72 BPM | TEMPERATURE: 98.2 F | SYSTOLIC BLOOD PRESSURE: 116 MMHG | WEIGHT: 185 LBS

## 2019-08-01 DIAGNOSIS — R79.89 ELEVATED SERUM CREATININE: ICD-10-CM

## 2019-08-01 DIAGNOSIS — M25.461 SWELLING OF RIGHT KNEE JOINT: ICD-10-CM

## 2019-08-01 DIAGNOSIS — R73.02 GLUCOSE INTOLERANCE (IMPAIRED GLUCOSE TOLERANCE): ICD-10-CM

## 2019-08-01 DIAGNOSIS — J06.9 UPPER RESPIRATORY TRACT INFECTION, UNSPECIFIED TYPE: Primary | ICD-10-CM

## 2019-08-01 DIAGNOSIS — E03.9 ACQUIRED HYPOTHYROIDISM: ICD-10-CM

## 2019-08-01 DIAGNOSIS — E78.5 HYPERLIPIDEMIA, UNSPECIFIED HYPERLIPIDEMIA TYPE: ICD-10-CM

## 2019-08-01 PROCEDURE — 84481 FREE ASSAY (FT-3): CPT

## 2019-08-01 PROCEDURE — 83036 HEMOGLOBIN GLYCOSYLATED A1C: CPT

## 2019-08-01 PROCEDURE — 80061 LIPID PANEL: CPT

## 2019-08-01 PROCEDURE — 84443 ASSAY THYROID STIM HORMONE: CPT

## 2019-08-01 PROCEDURE — 80053 COMPREHEN METABOLIC PANEL: CPT

## 2019-08-01 PROCEDURE — 82043 UR ALBUMIN QUANTITATIVE: CPT

## 2019-08-01 RX ORDER — CETIRIZINE HCL 10 MG
10 TABLET ORAL DAILY
Qty: 30 TAB | Refills: 0 | Status: SHIPPED | OUTPATIENT
Start: 2019-08-01 | End: 2020-01-30

## 2019-08-01 RX ORDER — FLUTICASONE PROPIONATE 50 MCG
2 SPRAY, SUSPENSION (ML) NASAL DAILY
Qty: 1 BOTTLE | Refills: 0 | Status: SHIPPED | OUTPATIENT
Start: 2019-08-01 | End: 2020-01-30

## 2019-08-01 NOTE — PROGRESS NOTES
Chief Complaint   Patient presents with    Thyroid Problem    Cold Symptoms     Cough and Chest Congestion    Leg Swelling     Left knee Sx. [address] [place] [address] 5/16-5/18/2019     1. Have you been to the ER, urgent care clinic since your last visit? Hospitalized since your last visit? Yes Where: [address] [place] [address] 5/16/2019-5/18/2019 Left Knee Sx;    2. Have you seen or consulted any other health care providers outside of the [address] [place] [address] since your last visit? Include any pap smears or colon screening.  No

## 2019-08-01 NOTE — PROGRESS NOTES
This is the Subsequent Medicare Annual Wellness Exam, performed 12 months or more after the Initial AWV or the last Subsequent AWV I have reviewed the patient's medical history in detail and updated the computerized patient record. History Past Medical History:  
Diagnosis Date  Aortic insufficiency  Arthritis  Atrial fibrillation ([address]  Chronic pain  Hearing loss 4/1/2010  Hematuria, microscopic 4/1/2010  Hypothyroidism 4/1/2010  On warfarin therapy  Prostate CA ([address] 1992  Rectus diastasis 4/1/2010  Seborrheic dermatitis, unspecified 4/1/2010  Unspecified adverse effect of anesthesia DIFFICULT TO AWAKEN Past Surgical History:  
Procedure Laterality Date  ENDOSCOPY, COLON, DIAGNOSTIC  11/19/07  
 w/polypectomy  HX GI    
 COLONOSCOPY  
 HX GI    
 RECTAL FISTULA REPAIR  
 HX HEENT  [id number] HX HEENT Right 01/2019 [address] [facility] HERNIA REPAIR  2004  
 hernia-  HX KNEE ARTHROSCOPY  7/08 MENISCECTOMY LEFT  
 HX KNEE ARTHROSCOPY    
 LEFT AND RIGHT ARTHROSCOPY  
 HX ORTHOPAEDIC  2012 BONE SPUR REMOVAL   
 [address] [facility]  HX TONSILLECTOMY  1945 Current Outpatient Medications Medication Sig Dispense Refill  docusate sodium (COLACE) 50 mg capsule Take 50 mg by mouth two (2) times daily as needed for Constipation.  warfarin (COUMADIN) 5 mg tablet Take 5 mg by mouth. Indications: [place]  warfarin (COUMADIN) 7.5 mg tablet Take 7.5 mg by mouth. Indications: MONDAYS, WEDNESDAYS, THURSDAYS ,[address] AND SUNDAYS  acetaminophen (TYLENOL) 500 mg tablet Take 500 mg by mouth every six (6) hours as needed for Pain.  levothyroxine (SYNTHROID) 150 mcg tablet TAKE 1 TABLET BEFORE BREAKFAST (Patient taking differently: Take 150 mcg by mouth Daily (before breakfast).  TAKE 1 TABLET BEFORE BREAKFAST) 90 Tab 1  
  multivitamin (ONE A DAY) tablet Take 1 Tab by mouth daily.  OTHER Take 2 Tabs by mouth every evening. fibercon/calciun  flecainide (TAMBOCOR) 100 mg tablet Take 100 mg by mouth two (2) times a day.  ASCORBIC ACID (VITAMIN C PO) Take 500 mg by mouth daily (after breakfast). Allergies Allergen Reactions  Penicillins Rash Family History Problem Relation Age of Onset  Stroke Mother  Heart Disease Father CHF  Diabetes Father  Cancer Sister   
     breast  
 Thyroid Disease Sister  Diabetes Daughter 9  
     insulin-dependent  Anesth Problems Neg Hx Social History Tobacco Use  Smoking status: Never Smoker  Smokeless tobacco: Never Used Substance Use Topics  Alcohol use: Yes Alcohol/week: 5.0 standard drinks Types: 2 Glasses of wine, 4 Standard drinks or equivalent per week Comment: moderate Patient Active Problem List  
Diagnosis Code  Hearing loss H91.90  
 History of prostate cancer Z85.46  
 Hypothyroidism E03.9  
 HTN, goal below 140/90 I10  
 Hematuria, microscopic R31.29  
 Rectus diastasis M62.08  
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  
 Unspecified adverse effect of anesthesia T41.45XA  Atrial fibrillation (HCC) I48.91  
 On warfarin therapy Z79.01  
 Aortic insufficiency I35.1  Primary localized osteoarthritis of left knee M17.12  
 Primary osteoarthritis of left knee M17.12 Depression Risk Factor Screening:  
 
3 most recent PHQ Screens 2019 Little interest or pleasure in doing things Not at all Feeling down, depressed, irritable, or hopeless Not at all Total Score PHQ 2 0 Alcohol Risk Factor Screening:  
{screenin} Functional Ability and Level of Safety:  
Hearing Loss {Desc; hearing loss:60995::\"Hearing is good. \"} Activities of Daily Living The home contains: {AWV Home XBMXGK:13793::\"JJ safety equipment. \"} 
{Functional ADL's:81623::\"Patient does total self care\"} Fall Risk Fall Risk Assessment, last 12 mths 2019 Able to walk? Yes Fall in past 12 months? Yes Fall with injury? No  
Number of falls in past 12 months 2 Fall Risk Score 2 Abuse Screen 
{Abuse Screen:::\"Patient is not abused\"} Cognitive Screening Evaluation of Cognitive Function: 
Has your family/caregiver stated any concerns about your memory: {AWV no/yes:98689::\"no\"} {Cognitive Screenin::\"Normal\"} Patient Care Team  
Patient Care Team: 
Francy Clarke MD as PCP - [facility]) Karli Machado MD (Cardiology) Debo White MD (Dermatology) Dae Eckert MD as Physician (Ophthalmology) Dean Knox RN as Nurse Navigator (Orthopedic Surgery) Assessment/Plan Education and counseling provided: 
{Education List, choose as appropriate:::\"Are appropriate based on today's review and evaluation\"} Diagnoses and all orders for this visit: 
 
1. Medicare annual wellness visit, subsequent Health Maintenance Due Topic Date Due  Pneumococcal 65+ years (2 of 2 - PPSV23) 2018  Influenza Age 5 to Adult  2019

## 2019-08-01 NOTE — PROGRESS NOTES
Patient Name: Bettina Sommers   MRN: [id number]    Gala Burnett is a 78 y.o. male who presents with the following:     Reports 10-day history of intermittent productive cough. Mucinex seems to be helping. Recently returned from a trip from [facility] in [place]. Denies any fevers, wheezing, shortness of breath, chest pain. He underwent a right total knee replacement in May. States that he has had ongoing swelling of his right knee and occasional pitting edema of his legs. His orthopedic gave him as needed high HCTZ of which patient stated it did not help. States that his orthopedic doctor next week. History of hypothyroidism, on levothyroxine. He has had elevated creatinine per chart review. Has intentionally lost about 15 pounds. BP Readings from Last 3 Encounters:   08/01/19 116/50   05/30/19 130/70   05/30/19 122/64     Wt Readings from Last 3 Encounters:   08/01/19 185 lb (83.9 kg)   05/19/19 200 lb (90.7 kg)   05/13/19 203 lb (92.1 kg)     Review of Systems   Constitutional: Negative for fever, malaise/fatigue and weight loss. Respiratory: Positive for cough. Negative for hemoptysis, shortness of breath and wheezing. Cardiovascular: Positive for leg swelling. Negative for chest pain, palpitations and PND. Gastrointestinal: Negative for abdominal pain, constipation, diarrhea, nausea and vomiting. Musculoskeletal: Positive for joint pain. The patient's medications, allergies, past medical history, surgical history, family history and social history were reviewed and updated where appropriate. Prior to Admission medications    Medication Sig Start Date End Date Taking? Authorizing Provider   docusate sodium (COLACE) 50 mg capsule Take 50 mg by mouth two (2) times daily as needed for Constipation. Yes Provider, Historical   warfarin (COUMADIN) 5 mg tablet Take 5 mg by mouth.  Indications: TUESDAYS AND FRIDAYS   Yes Provider, Historical   warfarin (COUMADIN) 7.5 mg tablet Take 7.5 mg by mouth. Indications: MONDAYS, WEDNESDAYS, THURSDAYS ,SATURDAYS AND SUNDAYS   Yes Provider, Historical   acetaminophen (TYLENOL) 500 mg tablet Take 500 mg by mouth every six (6) hours as needed for Pain. Yes Provider, Historical   levothyroxine (SYNTHROID) 150 mcg tablet TAKE 1 TABLET BEFORE BREAKFAST  Patient taking differently: Take 150 mcg by mouth Daily (before breakfast). TAKE 1 TABLET BEFORE BREAKFAST 2/18/19  Yes Francy Clarke MD   multivitamin (ONE A DAY) tablet Take 1 Tab by mouth daily. Yes Provider, Historical   OTHER Take 2 Tabs by mouth every evening. fibercon/calciun    Yes Provider, Historical   flecainide (TAMBOCOR) 100 mg tablet Take 100 mg by mouth two (2) times a day. 10/31/17  Yes Provider, Historical   ASCORBIC ACID (VITAMIN C PO) Take 500 mg by mouth daily (after breakfast). Yes Provider, Historical       Allergies   Allergen Reactions    Penicillins Rash           OBJECTIVE    Visit Vitals  /50 (BP 1 Location: Right arm, BP Patient Position: Sitting)   Pulse 72   Temp 98.2 °F (36.8 °C) (Oral)   Resp 18   Ht 5' 9\" (1.753 m)   Wt 185 lb (83.9 kg)   SpO2 94%   BMI 27.32 kg/m²       Physical Exam   Constitutional: He is oriented to person, place, and time and well-developed, well-nourished, and in no distress. No distress. HENT:   Head: Normocephalic and atraumatic. Right Ear: Tympanic membrane is not perforated and not erythematous. No middle ear effusion. No decreased hearing is noted. Left Ear: Tympanic membrane is not perforated and not erythematous. No middle ear effusion. No decreased hearing is noted. Nose: Nose normal. Right sinus exhibits no maxillary sinus tenderness and no frontal sinus tenderness. Left sinus exhibits no maxillary sinus tenderness and no frontal sinus tenderness. Mouth/Throat: Uvula is midline, oropharynx is clear and moist and mucous membranes are normal.   cobblestoning in posterior OP   Neck: Normal range of motion.  Neck supple. Cardiovascular: Normal rate, regular rhythm and normal heart sounds. Exam reveals no gallop and no friction rub. No murmur heard. Pulses:       Dorsalis pedis pulses are 2+ on the right side, and 2+ on the left side. Posterior tibial pulses are 2+ on the right side, and 2+ on the left side. Pulmonary/Chest: Effort normal and breath sounds normal. No respiratory distress. He has no wheezes. Musculoskeletal: He exhibits edema (scant pitting edema in BLE.). Right knee: He exhibits deformity (gross bony enlargement). He exhibits normal range of motion, no swelling, no effusion and no ecchymosis. Lymphadenopathy:     He has no cervical adenopathy. Neurological: He is alert and oriented to person, place, and time. Skin: He is not diaphoretic. Psychiatric: Mood, memory, affect and judgment normal.   Nursing note and vitals reviewed. ASSESSMENT AND PLAN  John Novoa is a 78 y.o. male who presents today for:    1. Upper respiratory tract infection, unspecified type  discussed diagnosis & treatment options, most likely viral at this time, reviewed the importance of avoiding unnecessary antibiotic therapy, will start on meds below, humidifier. Reviewed signs and symptoms that would indicate a worsening medical condition which would require immediate evaluation and treatment; patient expressed understanding of plan. - cetirizine (ZYRTEC) 10 mg tablet; Take 1 Tab by mouth daily. Dispense: 30 Tab; Refill: 0  - fluticasone propionate (FLONASE) 50 mcg/actuation nasal spray; 2 Sprays by Both Nostrils route daily. Dispense: 1 Bottle; Refill: 0    2. Swelling of right knee joint  Recommend compression stockings and f/u with ortho. 3. Acquired hypothyroidism  Stable, continue current treatment pending review of labs.   - TSH AND FREE T4  - T3, FREE    4. Elevated serum creatinine  - MICROALBUMIN, UR, RAND W/ MICROALB/CREAT RATIO    5. Glucose intolerance (impaired glucose tolerance)  - HEMOGLOBIN A1C WITH EAG    6. Hyperlipidemia, unspecified hyperlipidemia type  I have reviewed/discussed the above normal BMI with the patient. I have recommended the following interventions: dietary management education, guidance, and counseling, encourage exercise, monitor weight and prescribed dietary intake. - METABOLIC PANEL, COMPREHENSIVE  - LIPID PANEL       There are no discontinued medications. Follow-up and Dispositions    · Return in about 6 months (around 2/1/2020) for Medicare Wellness Visit (30 min). Medication risks/benefits/costs/interactions/alternatives discussed with patient. Advised patient to call back or return to office if symptoms worsen/change/persist. If patient cannot reach us or should anything more severe/urgent arise he/she should proceed directly to the nearest emergency department. Discussed expected course/resolution/complications of diagnosis in detail with patient. Patient given a written after visit summary which includes his/her diagnoses, current medications and vitals. Patient expressed understanding with the diagnosis and plan. Aivi N. Marylee Loffler M.D.

## 2019-08-01 NOTE — PATIENT INSTRUCTIONS
Medicare Wellness Visit, Male The best way to live healthy is to have a lifestyle where you eat a well-balanced diet, exercise regularly, limit alcohol use, and quit all forms of tobacco/nicotine, if applicable. Regular preventive services are another way to keep healthy. Preventive services (vaccines, screening tests, monitoring & exams) can help personalize your care plan, which helps you manage your own care. Screening tests can find health problems at the earliest stages, when they are easiest to treat. [facility] follows the current, evidence-based guidelines published by the [facility] Keven Tyler ([facility]) when recommending preventive services for our patients. Because we follow these guidelines, sometimes recommendations change over time as research supports it. (For example, a prostate screening blood test is no longer routinely recommended for men with no symptoms.) Of course, you and your doctor may decide to screen more often for some diseases, based on your risk and co-morbidities (chronic disease you are already diagnosed with). Preventive services for you include: - Medicare offers their members a free annual wellness visit, which is time for you and your primary care provider to discuss and plan for your preventive service needs. Take advantage of this benefit every year! 
-All adults over age 72 should receive the recommended pneumonia vaccines. Current USPSTF guidelines recommend a series of two vaccines for the best pneumonia protection.  
-All adults should have a flu vaccine yearly and an ECG.  All adults age 61 and older should receive a shingles vaccine once in their lifetime.   
-All adults age 38-68 who are overweight should have a diabetes screening test once every three years.  
-Other screening tests & preventive services for persons with diabetes include: an eye exam to screen for diabetic retinopathy, a kidney function test, a foot exam, and stricter control over your cholesterol.  
-Cardiovascular screening for adults with routine risk involves an electrocardiogram (ECG) at intervals determined by the provider.  
-Colorectal cancer screening should be done for adults age 54-65 with no increased risk factors for colorectal cancer. There are a number of acceptable methods of screening for this type of cancer. Each test has its own benefits and drawbacks. Discuss with your provider what is most appropriate for you during your annual wellness visit. The different tests include: colonoscopy (considered the best screening method), a fecal occult blood test, a fecal DNA test, and sigmoidoscopy. 
-All adults born between [facility] should be screened once for Hepatitis C. 
-An Abdominal Aortic Aneurysm (AAA) Screening is recommended for men age 73-68 who has ever smoked in their lifetime. Here is a list of your current Health Maintenance items (your personalized list of preventive services) with a due date: 
Health Maintenance Due Topic Date Due  Pneumococcal Vaccine (2 of 2 - PPSV23) 01/18/2018  Flu Vaccine  08/01/2019

## 2019-08-02 LAB
ALBUMIN SERPL-MCNC: 3.9 G/DL (ref 3.5–4.7)
ALBUMIN/GLOB SERPL: 1.4 {RATIO} (ref 1.2–2.2)
ALP SERPL-CCNC: 70 IU/L (ref 39–117)
ALT SERPL-CCNC: 12 IU/L (ref 0–44)
AST SERPL-CCNC: 17 IU/L (ref 0–40)
BILIRUB SERPL-MCNC: 0.6 MG/DL (ref 0–1.2)
BUN SERPL-MCNC: 19 MG/DL (ref 8–27)
BUN/CREAT SERPL: 15 (ref 10–24)
CALCIUM SERPL-MCNC: 9.3 MG/DL (ref 8.6–10.2)
CHLORIDE SERPL-SCNC: 106 MMOL/L (ref 96–106)
CHOLEST SERPL-MCNC: 155 MG/DL (ref 100–199)
CO2 SERPL-SCNC: 21 MMOL/L (ref 20–29)
CREAT SERPL-MCNC: 1.29 MG/DL (ref 0.76–1.27)
CREAT UR-MCNC: NORMAL MG/DL
EST. AVERAGE GLUCOSE BLD GHB EST-MCNC: 117 MG/DL
GLOBULIN SER CALC-MCNC: 2.8 G/DL (ref 1.5–4.5)
GLUCOSE SERPL-MCNC: 103 MG/DL (ref 65–99)
HBA1C MFR BLD: 5.7 % (ref 4.8–5.6)
HDLC SERPL-MCNC: 38 MG/DL
INTERPRETATION, 910389: NORMAL
INTERPRETATION: NORMAL
LDLC SERPL CALC-MCNC: 101 MG/DL (ref 0–99)
MICROALBUMIN UR-MCNC: NORMAL
PDF IMAGE, 910387: NORMAL
POTASSIUM SERPL-SCNC: 5.3 MMOL/L (ref 3.5–5.2)
PROT SERPL-MCNC: 6.7 G/DL (ref 6–8.5)
SODIUM SERPL-SCNC: 142 MMOL/L (ref 134–144)
T3FREE SERPL-MCNC: 2.2 PG/ML (ref 2–4.4)
T4 FREE SERPL-MCNC: 2.13 NG/DL (ref 0.82–1.77)
TRIGL SERPL-MCNC: 79 MG/DL (ref 0–149)
TSH SERPL DL<=0.005 MIU/L-ACNC: 2.93 UIU/ML (ref 0.45–4.5)
VLDLC SERPL CALC-MCNC: 16 MG/DL (ref 5–40)

## 2019-08-02 RX ORDER — LEVOTHYROXINE SODIUM 150 UG/1
TABLET ORAL
Qty: 90 TAB | Refills: 3 | Status: SHIPPED | OUTPATIENT
Start: 2019-08-02 | End: 2019-08-09 | Stop reason: SDUPTHER

## 2019-08-02 NOTE — PROGRESS NOTES
Outbound call to patient. Date of birth verified. Patient made aware of lab results and recommendations per Dr. Olya Ball. Patient verbalized understanding.

## 2019-08-09 ENCOUNTER — TELEPHONE (OUTPATIENT)
Dept: FAMILY MEDICINE CLINIC | Age: 79
End: 2019-08-09

## 2019-08-09 RX ORDER — LEVOTHYROXINE SODIUM 150 UG/1
TABLET ORAL
Qty: 90 TAB | Refills: 3 | Status: SHIPPED | OUTPATIENT
Start: 2019-08-09 | End: 2020-09-29

## 2019-08-09 NOTE — TELEPHONE ENCOUNTER
Patient is calling stating that he would like      levothyroxine (SYNTHROID) 150 mcg tablet, to be [facility]. I see the medication was sent over to [facility] and confirmed on 8/2/19.        Best callback:  [phone]        LOV:  Thursday, August 01, 2019

## 2020-01-30 ENCOUNTER — HOSPITAL ENCOUNTER (OUTPATIENT)
Dept: LAB | Age: 80
Discharge: HOME | End: 2020-01-30
Payer: MEDICARE

## 2020-01-30 ENCOUNTER — OFFICE VISIT (OUTPATIENT)
Dept: FAMILY MEDICINE CLINIC | Age: 80
End: 2020-01-30

## 2020-01-30 VITALS
RESPIRATION RATE: 16 BRPM | TEMPERATURE: 97.6 F | OXYGEN SATURATION: 98 % | BODY MASS INDEX: 26.72 KG/M2 | DIASTOLIC BLOOD PRESSURE: 50 MMHG | HEIGHT: 69 IN | SYSTOLIC BLOOD PRESSURE: 100 MMHG | HEART RATE: 53 BPM | WEIGHT: 180.4 LBS

## 2020-01-30 DIAGNOSIS — Z85.46 HISTORY OF PROSTATE CANCER: ICD-10-CM

## 2020-01-30 DIAGNOSIS — R26.81 GAIT INSTABILITY: ICD-10-CM

## 2020-01-30 DIAGNOSIS — Z00.00 MEDICARE ANNUAL WELLNESS VISIT, SUBSEQUENT: Primary | ICD-10-CM

## 2020-01-30 DIAGNOSIS — R73.02 GLUCOSE INTOLERANCE (IMPAIRED GLUCOSE TOLERANCE): ICD-10-CM

## 2020-01-30 DIAGNOSIS — Z79.01 ON WARFARIN THERAPY: ICD-10-CM

## 2020-01-30 DIAGNOSIS — E03.9 HYPOTHYROIDISM, UNSPECIFIED TYPE: ICD-10-CM

## 2020-01-30 DIAGNOSIS — I48.91 ATRIAL FIBRILLATION, UNSPECIFIED TYPE (HCC): ICD-10-CM

## 2020-01-30 DIAGNOSIS — I10 HTN, GOAL BELOW 140/90: ICD-10-CM

## 2020-01-30 DIAGNOSIS — E78.5 HYPERLIPIDEMIA, UNSPECIFIED HYPERLIPIDEMIA TYPE: ICD-10-CM

## 2020-01-30 PROCEDURE — 80053 COMPREHEN METABOLIC PANEL: CPT

## 2020-01-30 PROCEDURE — 80061 LIPID PANEL: CPT

## 2020-01-30 PROCEDURE — 84443 ASSAY THYROID STIM HORMONE: CPT

## 2020-01-30 PROCEDURE — 83036 HEMOGLOBIN GLYCOSYLATED A1C: CPT

## 2020-01-30 PROCEDURE — 84153 ASSAY OF PSA TOTAL: CPT

## 2020-01-30 PROCEDURE — 85027 COMPLETE CBC AUTOMATED: CPT

## 2020-01-30 NOTE — PROGRESS NOTES
Patient Name: Yahaira Newberry   MRN: [id number]    Bernadine Hoffman is a 78 y.o. male who presents with the following:     PSA: remote hx of prostate cancer  Lab Results   Component Value Date/Time    Prostate Specific Ag <0.1 01/31/2019 10:27 AM    Prostate Specific Ag <0.1 01/22/2018 10:53 AM    Prostate Specific Ag <0.1 01/07/2015 10:48 AM    Prostate Specific Ag 0.0 08/23/2010 10:35 AM    Prostate Specific Ag <0.1 08/19/2009 02:26 PM     CAD risk factors:  HTN: wnl, no meds  BP Readings from Last 3 Encounters:   01/30/20 100/50   08/01/19 116/50   05/30/19 130/70     Lipid: due  Lab Results   Component Value Date/Time    Cholesterol, total 155 08/01/2019 10:13 AM    HDL Cholesterol 38 (L) 08/01/2019 10:13 AM    LDL, calculated 101 (H) 08/01/2019 10:13 AM    VLDL, calculated 16 08/01/2019 10:13 AM    Triglyceride 79 08/01/2019 10:13 AM    CHOL/HDL Ratio 3.2 08/19/2009 02:26 PM     DM: due  Lab Results   Component Value Date/Time    Hemoglobin A1c 5.7 (H) 08/01/2019 10:13 AM     Hx of hypothyroidism, on levothyroxine. Occasionally feels off balance. S/p L TKR and feels like his knee hasn't fully healed. Did have a fall about 6 months ago but feels fine. Is on Xarelto. Review of Systems   Constitutional: Negative for fever, malaise/fatigue and weight loss. Respiratory: Negative for cough, hemoptysis, shortness of breath and wheezing. Cardiovascular: Negative for chest pain, palpitations, leg swelling and PND. Gastrointestinal: Negative for abdominal pain, constipation, diarrhea, nausea and vomiting. The patient's medications, allergies, past medical history, surgical history, family history and social history were reviewed and updated where appropriate. Prior to Admission medications    Medication Sig Start Date End Date Taking?  Authorizing Provider   rivaroxaban (XARELTO) 15 mg tab tablet  9/3/19  Yes Provider, Historical   levothyroxine (SYNTHROID) 150 mcg tablet TAKE 1 TABLET BEFORE BREAKFAST 8/9/19  Yes Yaya Nelson, AMIRAH   multivitamin (ONE A DAY) tablet Take 1 Tab by mouth daily. Yes Provider, Historical   OTHER Take 2 Tabs by mouth every evening. fibercon/calciun    Yes Provider, Historical   flecainide (TAMBOCOR) 100 mg tablet Take 100 mg by mouth two (2) times a day. 10/31/17  Yes Provider, Historical   ASCORBIC ACID (VITAMIN C PO) Take 500 mg by mouth daily (after breakfast). Yes Provider, Historical   cetirizine (ZYRTEC) 10 mg tablet Take 1 Tab by mouth daily. 8/1/19 1/30/20  Leena Carrizales MD   fluticasone propionate (FLONASE) 50 mcg/actuation nasal spray 2 Sprays by Both Nostrils route daily. 8/1/19 1/30/20  Leena Carrizales MD   docusate sodium (COLACE) 50 mg capsule Take 50 mg by mouth two (2) times daily as needed for Constipation. 1/30/20  Provider, Historical   warfarin (COUMADIN) 5 mg tablet Take 5 mg by mouth. Indications: TUESDAYS AND FRIDAYS    Provider, Historical   warfarin (COUMADIN) 7.5 mg tablet Take 7.5 mg by mouth. Indications: MONDAYS, WEDNESDAYS, THURSDAYS ,SATURDAYS AND SUNDAYS    Provider, Historical   acetaminophen (TYLENOL) 500 mg tablet Take 500 mg by mouth every six (6) hours as needed for Pain. 1/30/20  Provider, Historical       Allergies   Allergen Reactions    Penicillins Rash         OBJECTIVE    Visit Vitals  /50 (BP 1 Location: Right arm, BP Patient Position: Sitting)   Pulse (!) 53   Temp 97.6 °F (36.4 °C) (Oral)   Resp 16   Ht 5' 9\" (1.753 m)   Wt 180 lb 6.4 oz (81.8 kg)   SpO2 98%   BMI 26.64 kg/m²       Physical Exam  Vitals signs and nursing note reviewed. Constitutional:       General: He is not in acute distress. Appearance: He is not diaphoretic. Eyes:      Conjunctiva/sclera: Conjunctivae normal.      Pupils: Pupils are equal, round, and reactive to light. Cardiovascular:      Rate and Rhythm: Normal rate and regular rhythm. Heart sounds: Normal heart sounds. No murmur. No friction rub. No gallop. Pulmonary:      Effort: Pulmonary effort is normal. No respiratory distress. Breath sounds: Normal breath sounds. No wheezing. Skin:     General: Skin is warm and dry. Findings: No rash. Neurological:      Mental Status: He is alert and oriented to person, place, and time. Psychiatric:         Mood and Affect: Mood and affect normal.         Cognition and Memory: Memory normal.         Judgment: Judgment normal.           ASSESSMENT AND PLAN  Jessica Chavez is a 78 y.o. male who presents today for:    1. Medicare annual wellness visit, subsequent  See other note. 2. HTN, goal below 140/90  Stable, continue current treatment. 3. Hypothyroidism, unspecified type  Stable, continue current treatment pending review of labs. - TSH AND FREE T4    4. Glucose intolerance (impaired glucose tolerance)  - HEMOGLOBIN A1C WITH EAG    5. Hyperlipidemia, unspecified hyperlipidemia type  - METABOLIC PANEL, COMPREHENSIVE  - LIPID PANEL    6. On warfarin therapy  - CBC W/O DIFF    7. History of prostate cancer  - PSA, DIAGNOSTIC (PROSTATE SPECIFIC AG)    8. Gait instability  Offered PT but pt declined for now; he will notify me if he changes his mind. Cautioned re: falls given being on blood thinners. 9. Atrial fibrillation, unspecified type ([address]  Stable, continue current treatment. I have reviewed/discussed the above normal BMI with the patient. I have recommended the following interventions: dietary management education, guidance, and counseling, encourage exercise, monitor weight and prescribed dietary intake. Medications Discontinued During This Encounter   Medication Reason    cetirizine (ZYRTEC) 10 mg tablet     fluticasone propionate (FLONASE) 50 mcg/actuation nasal spray     acetaminophen (TYLENOL) 500 mg tablet     docusate sodium (COLACE) 50 mg capsule        Follow-up and Dispositions    · Return in about 1 year (around 1/30/2021) for Medicare Wellness Visit (30 min). Medication risks/benefits/costs/interactions/alternatives discussed with patient. Advised patient to call back or return to office if symptoms worsen/change/persist. If patient cannot reach us or should anything more severe/urgent arise he/she should proceed directly to the nearest emergency department. Discussed expected course/resolution/complications of diagnosis in detail with patient. Patient given a written after visit summary which includes his/her diagnoses, current medications and vitals. Patient expressed understanding with the diagnosis and plan. Annie Bob M.D. Diagnoses and all orders for this visit:    1. Medicare annual wellness visit, subsequent    2. HTN, goal below 140/90    3. Hypothyroidism, unspecified type  -     TSH AND FREE T4    4. Glucose intolerance (impaired glucose tolerance)  -     HEMOGLOBIN A1C WITH EAG    5. Hyperlipidemia, unspecified hyperlipidemia type  -     METABOLIC PANEL, COMPREHENSIVE  -     LIPID PANEL    6. On warfarin therapy  -     CBC W/O DIFF    7. History of prostate cancer  -     PSA, DIAGNOSTIC (PROSTATE SPECIFIC AG)    8. Gait instability    9. Atrial fibrillation, unspecified type [facility])  Assessment & Plan: This condition is managed by Specialist.  Key CAD CHF Meds             rivaroxaban (XARELTO) 15 mg tab tablet (Taking)     flecainide (TAMBOCOR) 100 mg tablet (Taking) Take 100 mg by mouth two (2) times a day. warfarin (COUMADIN) 5 mg tablet Take 5 mg by mouth. Indications: TUESDAYS AND FRIDAYS    warfarin (COUMADIN) 7.5 mg tablet Take 7.5 mg by mouth.  Indications: MONDAYS, WEDNESDAYS, THURSDAYS ,SATURDAYS AND SUNDAYS        Lab Results   Component Value Date/Time    Sodium 142 08/01/2019 10:13 AM    Potassium 5.3 08/01/2019 10:13 AM    Cholesterol, total 155 08/01/2019 10:13 AM    HDL Cholesterol 38 08/01/2019 10:13 AM    LDL, calculated 101 08/01/2019 10:13 AM    Triglyceride 79 08/01/2019 10:13 AM    INR 1.8 05/18/2019 04:29 AM INR POC 2 05/30/2019 10:13 AM    Prothrombin time 17.9 05/18/2019 04:29 AM

## 2020-01-30 NOTE — ASSESSMENT & PLAN NOTE
This condition is managed by Specialist.  Key CAD CHF Meds             rivaroxaban (XARELTO) 15 mg tab tablet (Taking)     flecainide (TAMBOCOR) 100 mg tablet (Taking) Take 100 mg by mouth two (2) times a day. warfarin (COUMADIN) 5 mg tablet Take 5 mg by mouth. Indications: TUESDAYS AND FRIDAYS    warfarin (COUMADIN) 7.5 mg tablet Take 7.5 mg by mouth.  Indications: MONDAYS, WEDNESDAYS, THURSDAYS ,SATURDAYS AND SUNDAYS        Lab Results   Component Value Date/Time    Sodium 142 08/01/2019 10:13 AM    Potassium 5.3 08/01/2019 10:13 AM    Cholesterol, total 155 08/01/2019 10:13 AM    HDL Cholesterol 38 08/01/2019 10:13 AM    LDL, calculated 101 08/01/2019 10:13 AM    Triglyceride 79 08/01/2019 10:13 AM    INR 1.8 05/18/2019 04:29 AM    INR POC 2 05/30/2019 10:13 AM    Prothrombin time 17.9 05/18/2019 04:29 AM

## 2020-01-30 NOTE — PROGRESS NOTES
This is the Subsequent Medicare Annual Wellness Exam, performed 12 months or more after the Initial AWV or the last Subsequent AWV    I have reviewed the patient's medical history in detail and updated the computerized patient record.      History     Patient Active Problem List   Diagnosis Code    Hearing loss H91.90    History of prostate cancer Z85.46    Hypothyroidism E03.9    HTN, goal below 140/90 I10    Hematuria, microscopic R31.29    Rectus diastasis M62.08    ED (erectile dysfunction) N52.9    Tear of meniscus of right knee S83.206A    Left inguinal hernia K40.90    Seborrheic dermatitis, unspecified L21.9    Keratoacanthoma L85.8    Degenerative arthritis of left knee M17.12    Degenerative arthritis of hip M16.9    Unspecified adverse effect of anesthesia T41.45XA    Atrial fibrillation (HCC) I48.91    On warfarin therapy Z79.01    Aortic insufficiency I35.1    Primary localized osteoarthritis of left knee M17.12    Primary osteoarthritis of left knee M17.12     Past Medical History:   Diagnosis Date    Aortic insufficiency     Arthritis     Atrial fibrillation (HCC)     Chronic pain     Hearing loss 4/1/2010    Hematuria, microscopic 4/1/2010    Hypothyroidism 4/1/2010    On warfarin therapy     Prostate CA ([facility] [address] 1992    Rectus diastasis 4/1/2010    Seborrheic dermatitis, unspecified 4/1/2010    Unspecified adverse effect of anesthesia     DIFFICULT TO AWAKEN      Past Surgical History:   Procedure Laterality Date    ENDOSCOPY, COLON, DIAGNOSTIC  11/19/07    w/polypectomy    HX GI      COLONOSCOPY    HX GI      RECTAL FISTULA REPAIR    HX HEENT  [id number]    [facility] [address] ,FACE     HX HEENT Right 01/2019    CAUTERY,NOSE     HX HERNIA REPAIR  2004    hernia-    HX KNEE ARTHROSCOPY  7/08    MENISCECTOMY LEFT    HX KNEE ARTHROSCOPY      LEFT AND RIGHT ARTHROSCOPY    HX ORTHOPAEDIC  2012    BONE SPUR REMOVAL     HX RADICAL PROSTATECTOMY  1994    HX TONSILLECTOMY  1943     Current Outpatient Medications   Medication Sig Dispense Refill    rivaroxaban (XARELTO) 15 mg tab tablet       levothyroxine (SYNTHROID) 150 mcg tablet TAKE 1 TABLET BEFORE BREAKFAST 90 Tab 3    multivitamin (ONE A DAY) tablet Take 1 Tab by mouth daily.  OTHER Take 2 Tabs by mouth every evening. fibercon/calciun       flecainide (TAMBOCOR) 100 mg tablet Take 100 mg by mouth two (2) times a day.  ASCORBIC ACID (VITAMIN C PO) Take 500 mg by mouth daily (after breakfast).  warfarin (COUMADIN) 5 mg tablet Take 5 mg by mouth. Indications: TUESDAYS AND FRIDAYS      warfarin (COUMADIN) 7.5 mg tablet Take 7.5 mg by mouth. Indications: MONDAYS, WEDNESDAYS, THURSDAYS ,SATURDAYS AND SUNDAYS       Allergies   Allergen Reactions    Penicillins Rash       Family History   Problem Relation Age of Onset    Stroke Mother     Heart Disease Father         CHF    Diabetes Father     Cancer Sister         breast    Thyroid Disease Sister     Diabetes Daughter 5        insulin-dependent    Anesth Problems Neg Hx      Social History     Tobacco Use    Smoking status: Never Smoker    Smokeless tobacco: Never Used   Substance Use Topics    Alcohol use: Yes     Alcohol/week: 5.0 standard drinks     Types: 2 Glasses of wine, 4 Standard drinks or equivalent per week     Comment: moderate       Depression Risk Factor Screening:     3 most recent PHQ Screens 1/30/2020   Little interest or pleasure in doing things Not at all   Feeling down, depressed, irritable, or hopeless Not at all   Total Score PHQ 2 0       Alcohol Risk Factor Screening (MALE > 65): Do you average more 1 drink per night or more than 7 drinks a week: No    In the past three months have you have had more than 4 drinks containing alcohol on one occasion: No      Functional Ability and Level of Safety:   Hearing: Hearing is good. Activities of Daily Living: The home contains: no safety equipment.   Patient does total self care    Ambulation: with no difficulty    Fall Risk:  Fall Risk Assessment, last 12 mths 1/30/2020   Able to walk? Yes   Fall in past 12 months? No   Fall with injury? -   Number of falls in past 12 months -   Fall Risk Score -       Abuse Screen:  Patient is not abused    Cognitive Screening   Has your family/caregiver stated any concerns about your memory: no      Patient Care Team   Patient Care Team:  Dipika Jean Baptiste MD as PCP - General (Family Practice)  Dipika Jean Baptiste MD as PCP - [facility]  Maggi Kwok MD (Cardiology)  Henry Mccoy MD (Dermatology)  Elsy Bright MD as Physician (Ophthalmology)    Assessment/Plan   Education and counseling provided:  Are appropriate based on today's review and evaluation    Diagnoses and all orders for this visit:    1. Medicare annual wellness visit, subsequent    2. HTN, goal below 140/90    3. Hypothyroidism, unspecified type  -     TSH AND FREE T4    4. Glucose intolerance (impaired glucose tolerance)  -     HEMOGLOBIN A1C WITH EAG    5. Hyperlipidemia, unspecified hyperlipidemia type  -     METABOLIC PANEL, COMPREHENSIVE  -     LIPID PANEL    6. On warfarin therapy  -     CBC W/O DIFF    7. History of prostate cancer  -     PSA, DIAGNOSTIC (PROSTATE SPECIFIC AG)    8. Gait instability    9. Atrial fibrillation, unspecified type [facility])  Assessment & Plan: This condition is managed by Specialist.  Key CAD CHF Meds             rivaroxaban (XARELTO) 15 mg tab tablet (Taking)     flecainide (TAMBOCOR) 100 mg tablet (Taking) Take 100 mg by mouth two (2) times a day. warfarin (COUMADIN) 5 mg tablet Take 5 mg by mouth. Indications: TUESDAYS AND FRIDAYS    warfarin (COUMADIN) 7.5 mg tablet Take 7.5 mg by mouth.  Indications: MONDAYS, WEDNESDAYS, THURSDAYS ,SATURDAYS AND SUNDAYS        Lab Results   Component Value Date/Time    Sodium 142 08/01/2019 10:13 AM    Potassium 5.3 08/01/2019 10:13 AM    Cholesterol, total 155 08/01/2019 10:13 AM    HDL Cholesterol 38 08/01/2019 10:13 AM    LDL, calculated 101 08/01/2019 10:13 AM    Triglyceride 79 08/01/2019 10:13 AM    INR 1.8 05/18/2019 04:29 AM    INR POC 2 05/30/2019 10:13 AM    Prothrombin time 17.9 05/18/2019 04:29 AM           Health Maintenance Due   Topic Date Due    GLAUCOMA SCREENING Q2Y  01/29/2020    MEDICARE YEARLY EXAM  02/01/2020

## 2020-01-30 NOTE — PATIENT INSTRUCTIONS
Medicare Wellness Visit, Male    The best way to live healthy is to have a lifestyle where you eat a well-balanced diet, exercise regularly, limit alcohol use, and quit all forms of tobacco/nicotine, if applicable. Regular preventive services are another way to keep healthy. Preventive services (vaccines, screening tests, monitoring & exams) can help personalize your care plan, which helps you manage your own care. Screening tests can find health problems at the earliest stages, when they are easiest to treat. [facility] follows the current, evidence-based guidelines published by the [facility] ([facility]) when recommending preventive services for our patients. Because we follow these guidelines, sometimes recommendations change over time as research supports it. (For example, a prostate screening blood test is no longer routinely recommended for men with no symptoms). Of course, you and your doctor may decide to screen more often for some diseases, based on your risk and co-morbidities (chronic disease you are already diagnosed with). Preventive services for you include:  - Medicare offers their members a free annual wellness visit, which is time for you and your primary care provider to discuss and plan for your preventive service needs. Take advantage of this benefit every year!  -All adults over age 72 should receive the recommended pneumonia vaccines. Current USPSTF guidelines recommend a series of two vaccines for the best pneumonia protection.   -All adults should have a flu vaccine yearly and tetanus vaccine every 10 years.  -All adults age 48 and older should receive the shingles vaccines (series of two vaccines).        -All adults age 38-68 who are overweight should have a diabetes screening test once every three years.   -Other screening tests & preventive services for persons with diabetes include: an eye exam to screen for diabetic retinopathy, a kidney function test, a foot exam, and stricter control over your cholesterol.   -Cardiovascular screening for adults with routine risk involves an electrocardiogram (ECG) at intervals determined by the provider.   -Colorectal cancer screening should be done for adults age 54-65 with no increased risk factors for colorectal cancer. There are a number of acceptable methods of screening for this type of cancer. Each test has its own benefits and drawbacks. Discuss with your provider what is most appropriate for you during your annual wellness visit. The different tests include: colonoscopy (considered the best screening method), a fecal occult blood test, a fecal DNA test, and sigmoidoscopy.  -All adults born between [facility] should be screened once for Hepatitis C.  -An Abdominal Aortic Aneurysm (AAA) Screening is recommended for men age 73-68 who has ever smoked in their lifetime.      Here is a list of your current Health Maintenance items (your personalized list of preventive services) with a due date:  Health Maintenance Due   Topic Date Due    Glaucoma Screening   01/29/2020    Annual Well Visit  02/01/2020

## 2020-01-30 NOTE — PROGRESS NOTES
Chief Complaint   Patient presents with   [facility] Annual Wellness Visit     1. Have you been to the ER, urgent care clinic since your last visit? Hospitalized since your last visit? No    2. Have you seen or consulted any other health care providers outside of the [address] [place] [address] since your last visit? Include any pap smears or colon screening.  Dr. Alpesh Hernandez 12/2019 [facility]

## 2020-01-31 LAB
ALBUMIN SERPL-MCNC: 4.2 G/DL (ref 3.6–4.6)
ALBUMIN/GLOB SERPL: 1.6 {RATIO} (ref 1.2–2.2)
ALP SERPL-CCNC: 71 IU/L (ref 39–117)
ALT SERPL-CCNC: 16 IU/L (ref 0–44)
AST SERPL-CCNC: 15 IU/L (ref 0–40)
BILIRUB SERPL-MCNC: 0.6 MG/DL (ref 0–1.2)
BUN SERPL-MCNC: 27 MG/DL (ref 8–27)
BUN/CREAT SERPL: 20 (ref 10–24)
CALCIUM SERPL-MCNC: 9.7 MG/DL (ref 8.6–10.2)
CHLORIDE SERPL-SCNC: 103 MMOL/L (ref 96–106)
CHOLEST SERPL-MCNC: 146 MG/DL (ref 100–199)
CO2 SERPL-SCNC: 23 MMOL/L (ref 20–29)
CREAT SERPL-MCNC: 1.37 MG/DL (ref 0.76–1.27)
ERYTHROCYTE [DISTWIDTH] IN BLOOD BY AUTOMATED COUNT: 12.4 % (ref 11.6–15.4)
EST. AVERAGE GLUCOSE BLD GHB EST-MCNC: 108 MG/DL
GLOBULIN SER CALC-MCNC: 2.6 G/DL (ref 1.5–4.5)
GLUCOSE SERPL-MCNC: 93 MG/DL (ref 65–99)
HBA1C MFR BLD: 5.4 % (ref 4.8–5.6)
HCT VFR BLD AUTO: 43.7 % (ref 37.5–51)
HDLC SERPL-MCNC: 55 MG/DL
HGB BLD-MCNC: 14.8 G/DL (ref 13–17.7)
INTERPRETATION, 910389: NORMAL
INTERPRETATION: NORMAL
LDLC SERPL CALC-MCNC: 82 MG/DL (ref 0–99)
MCH RBC QN AUTO: 33.3 PG (ref 26.6–33)
MCHC RBC AUTO-ENTMCNC: 33.9 G/DL (ref 31.5–35.7)
MCV RBC AUTO: 98 FL (ref 79–97)
PDF IMAGE, 910387: NORMAL
PLATELET # BLD AUTO: 143 X10E3/UL (ref 150–450)
POTASSIUM SERPL-SCNC: 5.1 MMOL/L (ref 3.5–5.2)
PROT SERPL-MCNC: 6.8 G/DL (ref 6–8.5)
PSA SERPL-MCNC: <0.1 NG/ML (ref 0–4)
RBC # BLD AUTO: 4.44 X10E6/UL (ref 4.14–5.8)
SODIUM SERPL-SCNC: 145 MMOL/L (ref 134–144)
T4 FREE SERPL-MCNC: 1.98 NG/DL (ref 0.82–1.77)
TRIGL SERPL-MCNC: 44 MG/DL (ref 0–149)
TSH SERPL DL<=0.005 MIU/L-ACNC: 3.23 UIU/ML (ref 0.45–4.5)
VLDLC SERPL CALC-MCNC: 9 MG/DL (ref 5–40)
WBC # BLD AUTO: 6.2 X10E3/UL (ref 3.4–10.8)

## 2020-01-31 NOTE — PROGRESS NOTES
Please send a LETTER with the following: Your labs remain stable. Your kidney marker remains high but we can continue to monitor this. No medication changes at this time.

## 2020-07-10 NOTE — NURSE NAVIGATOR
[address] Handoff FROM:                                TO: [address] ([address] [place] [address] or Facility name) [address] 
[address] [place] [id number] Dept: [phone] Loc: [phone] Room#:  553/01 Nurse Navigator:  Charline Gao RN 
  
 
  SITUATION  
  
ASAScore: ASA 2 - Patient with mild systemic disease with no functional limitations Admitted:  5/16/2019  Hospital Day: 3      Attending Provider:  No att. providers found Consultations:  None PCP:  Constanza Nguyen MD   [phone] Admitting Dx:  Primary osteoarthritis of left knee [M17.12] Principal Problem: 
  Primary localized osteoarthritis of left knee (5/10/2019) Active Problems: 
  Primary osteoarthritis of left knee (5/16/2019) 4 Days Post-Op of  
Procedure(s): LEFT TOTAL KNEE REPLACEMENT  
BY: Yue Anthony MD             ON: 5/16/2019 Code Status: Prior             Advance Directive? Verified (Send w/patient) Isolation:  There are currently no Active Isolations       MDRO: No current active infections BACKGROUND Allergies: Allergies Allergen Reactions  Penicillins Rash Past Medical History:  
Diagnosis Date  Aortic insufficiency  Arthritis  Atrial fibrillation ([address]  Chronic pain  Hearing loss 4/1/2010  Hematuria, microscopic 4/1/2010  Hypothyroidism 4/1/2010  On warfarin therapy  Prostate CA ([address] 1992  Rectus diastasis 4/1/2010  Seborrheic dermatitis, unspecified 4/1/2010  Unspecified adverse effect of anesthesia DIFFICULT TO AWAKEN Past Surgical History:  
Procedure Laterality Date  ENDOSCOPY, COLON, DIAGNOSTIC  11/19/07  
 w/polypectomy  HX GI    
 COLONOSCOPY  HX GI    
 RECTAL FISTULA REPAIR  
 HX HEENT  [id number] [facility] [address] HX HEENT Right 01/2019 [id number] [facility] HERNIA REPAIR  2004  
 hernia-  HX KNEE ARTHROSCOPY  7/08 MENISCECTOMY LEFT  
 HX KNEE ARTHROSCOPY    
 LEFT AND RIGHT ARTHROSCOPY  
 HX ORTHOPAEDIC  2012 BONE SPUR REMOVAL   
 [facility]  HX TONSILLECTOMY  1943 Prior to Admission Medications Prescriptions Last Dose Informant Patient Reported? Taking? ASCORBIC ACID (VITAMIN C PO) 5/9/2019  Yes No  
Sig: Take 500 mg by mouth daily (after breakfast). OTHER 5/15/2019 at Unknown time  Yes Yes Sig: Take 2 Tabs by mouth every evening. fibercon/calciun   
acetaminophen (TYLENOL) 500 mg tablet 5/9/2019 at Unknown time  Yes Yes Sig: Take 500 mg by mouth every six (6) hours as needed for Pain. flecainide (TAMBOCOR) 100 mg tablet 5/16/2019 at 0700  Yes Yes Sig: Take 100 mg by mouth two (2) times a day. levothyroxine (SYNTHROID) 150 mcg tablet 5/16/2019 at 0700  No Yes Sig: TAKE 1 TABLET BEFORE BREAKFAST  
multivitamin (ONE A DAY) tablet 5/9/2019  Yes No  
Sig: Take 1 Tab by mouth daily. warfarin (COUMADIN) 5 mg tablet 5/10/2019  Yes No  
Sig: Take 5 mg by mouth. Indications: TUESDAYS AND FRIDAYS  
warfarin (COUMADIN) 7.5 mg tablet 5/12/2019  Yes No  
Sig: Take 7.5 mg by mouth. Indications: MONDAYS, WEDNESDAYS, THURSDAYS ,[address] AND SUNDAYS Facility-Administered Medications: None Vaccinations:   
Immunization History Administered Date(s) Administered  Influenza High Dose Vaccine PF 09/08/2016, 09/22/2017, 09/01/2018  Influenza Vaccine 08/20/2015  Influenza Vaccine Whole 09/02/2012  Pneumococcal Conjugate (PCV-13) 01/18/2017  Pneumococcal Vaccine (Pcv) 08/19/2009  TDAP Vaccine 08/19/2009  Zoster 05/01/2007  Zoster Recombinant 11/07/2018, 02/27/2019 ASSESSMENT Age: [de-identified] y.o. Gender: male Height:                      Weight:   
 
No data found. Active Orders There are no active orders of the following types: Diet. Orientation: Orientation Level: Appropriate for age, Oriented X4 Active Lines/Drains:  (Peg Tube / Booker / CL or S/L?):no 
 
Urinary Status: Voiding      Last BM: Last Bowel Movement Date: 05/16/19 Skin Integrity: Incision (comment), Wound (add Wound LDA)(left knee incision, left hand skin tear) Mobility: Slightly limited Weight Bearing Status: WBAT (Weight Bearing as Tolerated) Gait Training Assistive Device: Gait belt, Walker, rolling Ambulation - Level of Assistance: Modified independent, Adaptive equipment, Additional time Distance (ft): 200 Feet (ft) Stairs - Level of Assistance: Modified independent, Adaptive equipment, Additional time Number of Stairs Trained: 4 Rail Use: Left (plus cane) Interventions: Safety awareness training, Tactile cues, Verbal cues, Visual/Demos On Anticoagulation? YES  Coumadin Pain Medications given:  Oxycodone 5 mg 1-2 q 4 hours PRN Lab Results Component Value Date/Time Glucose 137 (H) 05/17/2019 03:43 AM  
 Hemoglobin A1c 5.7 05/13/2019 12:42 PM  
 INR 1.8 (H) 05/18/2019 04:29 AM  
 INR 1.4 (H) 05/17/2019 03:43 AM  
 HGB 11.3 (L) 05/17/2019 03:43 AM  
 HGB 14.4 05/13/2019 12:42 PM  
 HGB 16.3 01/31/2019 10:27 AM  
 HGB 15.4 07/31/2018 02:08 PM  
 
 
Readmission Risks: 
Score:      
  RECOMMENDATION See After Visit Summary (AVS) for: · Discharge instructions · After [address] · Medication Reconciliation [facility] Nurse Navigator ALEXA Paul, RN-BC Office  [phone] Cell      [phone] Fax      [phone] 
[email or web address] Mata 
 
 10-Jul-2020 23:33

## 2020-09-29 RX ORDER — LEVOTHYROXINE SODIUM 150 UG/1
TABLET ORAL
Qty: 90 TAB | Refills: 3 | Status: SHIPPED | OUTPATIENT
Start: 2020-09-29 | End: 2021-08-16

## 2021-01-07 ENCOUNTER — OFFICE VISIT (OUTPATIENT)
Dept: FAMILY MEDICINE CLINIC | Age: 81
End: 2021-01-07
Payer: MEDICARE

## 2021-01-07 VITALS
RESPIRATION RATE: 18 BRPM | TEMPERATURE: 97.3 F | DIASTOLIC BLOOD PRESSURE: 68 MMHG | BODY MASS INDEX: 29.24 KG/M2 | OXYGEN SATURATION: 99 % | WEIGHT: 198 LBS | SYSTOLIC BLOOD PRESSURE: 149 MMHG | HEART RATE: 60 BPM

## 2021-01-07 DIAGNOSIS — B00.1 FEVER BLISTER: ICD-10-CM

## 2021-01-07 DIAGNOSIS — H92.01 OTALGIA, RIGHT EAR: Primary | ICD-10-CM

## 2021-01-07 PROCEDURE — G0463 HOSPITAL OUTPT CLINIC VISIT: HCPCS | Performed by: NURSE PRACTITIONER

## 2021-01-07 PROCEDURE — G8536 NO DOC ELDER MAL SCRN: HCPCS | Performed by: NURSE PRACTITIONER

## 2021-01-07 PROCEDURE — G8754 DIAS BP LESS 90: HCPCS | Performed by: NURSE PRACTITIONER

## 2021-01-07 PROCEDURE — G8417 CALC BMI ABV UP PARAM F/U: HCPCS | Performed by: NURSE PRACTITIONER

## 2021-01-07 PROCEDURE — 99213 OFFICE O/P EST LOW 20 MIN: CPT | Performed by: NURSE PRACTITIONER

## 2021-01-07 PROCEDURE — G8753 SYS BP > OR = 140: HCPCS | Performed by: NURSE PRACTITIONER

## 2021-01-07 PROCEDURE — G8432 DEP SCR NOT DOC, RNG: HCPCS | Performed by: NURSE PRACTITIONER

## 2021-01-07 PROCEDURE — 1101F PT FALLS ASSESS-DOCD LE1/YR: CPT | Performed by: NURSE PRACTITIONER

## 2021-01-07 PROCEDURE — G8427 DOCREV CUR MEDS BY ELIG CLIN: HCPCS | Performed by: NURSE PRACTITIONER

## 2021-01-07 NOTE — PROGRESS NOTES
Chief Complaint   Patient presents with    Ear Pain     1/7 days       1. Have you been to the ER, urgent care clinic since your last visit? Hospitalized since your last visit? No    2. Have you seen or consulted any other health care providers outside of the [address] [place] [address] since your last visit? Include any pap smears or colon screening.  No    3 most recent PHQ Screens 1/7/2021   Little interest or pleasure in doing things Not at all   Feeling down, depressed, irritable, or hopeless Not at all   Total Score PHQ 2 0

## 2021-01-07 NOTE — PROGRESS NOTES
HISTORY OF PRESENT ILLNESS  Nikita Todd is a 78 y.o. male. HPI  C/o mild intermittent pain in right ear that began yesterday. No other URI symptoms. Also has developed a fever blister of right lower lip. Past medical history, social history, family history and medications were reviewed and updated. Blood pressure (!) 149/68, pulse 60, temperature 97.3 °F (36.3 °C), temperature source Temporal, resp. rate 18, height (P) 5' 9\" (1.753 m), weight 198 lb (89.8 kg), SpO2 99 %. Review of Systems   Constitutional: Negative for chills, fever and malaise/fatigue. HENT: Positive for ear pain and hearing loss (chronic). Negative for congestion, ear discharge, sinus pain, sore throat and tinnitus. Respiratory: Negative for cough and shortness of breath. Cardiovascular: Negative. All other systems reviewed and are negative. Physical Exam  Constitutional:       General: He is not in acute distress. HENT:      Right Ear: Tympanic membrane and ear canal normal. There is no impacted cerumen. Left Ear: Tympanic membrane normal.      Ears:      Comments: Moderate cerumen of left canal.     Mouth/Throat:      Mouth: Mucous membranes are moist.      Pharynx: Oropharynx is clear. Comments: Erythematous papule of right outer lower lip. Neck:      Musculoskeletal: Neck supple. Cardiovascular:      Rate and Rhythm: Normal rate and regular rhythm. Heart sounds: Normal heart sounds. Pulmonary:      Effort: Pulmonary effort is normal.      Breath sounds: Normal breath sounds. Lymphadenopathy:      Cervical: No cervical adenopathy. Skin:     General: Skin is warm and dry. Neurological:      Mental Status: He is alert. ASSESSMENT and PLAN  Diagnoses and all orders for this visit:    1. Otalgia, right ear  Normal exam.  Possible referred pain from fever blister. Trial tylenol as directed prn.    2. Fever blister  OTC abreva. Follow up prn if sx worsen or FTI.   We discussed the expected course, resolution and complications of the diagnosis in detail. Medication risks, benefits, costs, interactions and side effects were discussed. The patient was advised to contact the office for worsening of condition or FTI as anticipated. The patient expressed understanding of the diagnosis and plan.

## 2021-02-01 ENCOUNTER — OFFICE VISIT (OUTPATIENT)
Dept: FAMILY MEDICINE CLINIC | Age: 81
End: 2021-02-01
Payer: MEDICARE

## 2021-02-01 VITALS
RESPIRATION RATE: 18 BRPM | OXYGEN SATURATION: 100 % | DIASTOLIC BLOOD PRESSURE: 70 MMHG | BODY MASS INDEX: 29.3 KG/M2 | WEIGHT: 197.8 LBS | HEART RATE: 60 BPM | TEMPERATURE: 98.4 F | HEIGHT: 69 IN | SYSTOLIC BLOOD PRESSURE: 118 MMHG

## 2021-02-01 DIAGNOSIS — R73.02 GLUCOSE INTOLERANCE (IMPAIRED GLUCOSE TOLERANCE): ICD-10-CM

## 2021-02-01 DIAGNOSIS — R31.9 HEMATURIA, UNSPECIFIED TYPE: ICD-10-CM

## 2021-02-01 DIAGNOSIS — I10 HTN, GOAL BELOW 140/90: ICD-10-CM

## 2021-02-01 DIAGNOSIS — H61.22 IMPACTED CERUMEN OF LEFT EAR: ICD-10-CM

## 2021-02-01 DIAGNOSIS — Z85.46 HISTORY OF PROSTATE CANCER: ICD-10-CM

## 2021-02-01 DIAGNOSIS — E78.5 HYPERLIPIDEMIA, UNSPECIFIED HYPERLIPIDEMIA TYPE: ICD-10-CM

## 2021-02-01 DIAGNOSIS — E03.9 HYPOTHYROIDISM, UNSPECIFIED TYPE: ICD-10-CM

## 2021-02-01 DIAGNOSIS — Z00.00 ENCOUNTER FOR MEDICARE ANNUAL WELLNESS EXAM: Primary | ICD-10-CM

## 2021-02-01 PROCEDURE — 99214 OFFICE O/P EST MOD 30 MIN: CPT | Performed by: FAMILY MEDICINE

## 2021-02-01 PROCEDURE — G8419 CALC BMI OUT NRM PARAM NOF/U: HCPCS | Performed by: FAMILY MEDICINE

## 2021-02-01 PROCEDURE — 1101F PT FALLS ASSESS-DOCD LE1/YR: CPT | Performed by: FAMILY MEDICINE

## 2021-02-01 PROCEDURE — G0463 HOSPITAL OUTPT CLINIC VISIT: HCPCS | Performed by: FAMILY MEDICINE

## 2021-02-01 PROCEDURE — G8536 NO DOC ELDER MAL SCRN: HCPCS | Performed by: FAMILY MEDICINE

## 2021-02-01 PROCEDURE — G0439 PPPS, SUBSEQ VISIT: HCPCS | Performed by: FAMILY MEDICINE

## 2021-02-01 PROCEDURE — G8752 SYS BP LESS 140: HCPCS | Performed by: FAMILY MEDICINE

## 2021-02-01 PROCEDURE — G8754 DIAS BP LESS 90: HCPCS | Performed by: FAMILY MEDICINE

## 2021-02-01 PROCEDURE — G8510 SCR DEP NEG, NO PLAN REQD: HCPCS | Performed by: FAMILY MEDICINE

## 2021-02-01 PROCEDURE — G8427 DOCREV CUR MEDS BY ELIG CLIN: HCPCS | Performed by: FAMILY MEDICINE

## 2021-02-01 PROCEDURE — 69209 REMOVE IMPACTED EAR WAX UNI: CPT | Performed by: FAMILY MEDICINE

## 2021-02-01 NOTE — PROGRESS NOTES
Patient Name: Fely Mcdonald   MRN: [id number]    Alejandro Shafer is a 78 y.o. male who presents with the following:     Patient currently going to physical therapy to work on his balance. He had some neuropathy which has been confirmed by EMG through his neurologist.  Also has some spinal stenosis per recent MRI of his lower back. Symptoms are improving with physical therapy. Recent lab work done at the neurologist showed normal thyroid levels and cholesterol levels. He is now on Xarelto instead of warfarin for his A. Fib. Has noticed for a long time that there are bubbles in his urine. Occasionally will have some blood in his urine. Denies any pain. History of prostate cancer but has not seen a urologist in several years. Feels like he has earwax in his left ear; hx of hearing loss; not wearing his aids today. Review of Systems   Constitutional: Negative for fever, malaise/fatigue and weight loss. HENT: Positive for hearing loss. Respiratory: Negative for cough, hemoptysis, shortness of breath and wheezing. Cardiovascular: Negative for chest pain, palpitations, leg swelling and PND. Gastrointestinal: Negative for abdominal pain, constipation, diarrhea, nausea and vomiting. Genitourinary: Positive for hematuria. Negative for dysuria, flank pain, frequency and urgency. Musculoskeletal: Positive for back pain. The patient's medications, allergies, past medical history, surgical history, family history and social history were reviewed and updated where appropriate. Prior to Admission medications    Medication Sig Start Date End Date Taking? Authorizing Provider   levothyroxine (SYNTHROID) 150 mcg tablet TAKE 1 TABLET BEFORE BREAKFAST 9/29/20  Yes Rene Anderson MD   rivaroxaban (XARELTO) 15 mg tab tablet  9/3/19  Yes Provider, Historical   multivitamin (ONE A DAY) tablet Take 1 Tab by mouth daily.    Yes Provider, Historical   OTHER Take 2 Tabs by mouth every evening. fibercon/calciun    Yes Provider, Historical   ASCORBIC ACID (VITAMIN C PO) Take 500 mg by mouth daily (after breakfast). Yes Provider, Historical   warfarin (COUMADIN) 5 mg tablet Take 5 mg by mouth. Indications: TUESDAYS AND FRIDAYS 2/1/21  Provider, Historical   warfarin (COUMADIN) 7.5 mg tablet Take 7.5 mg by mouth. Indications: MONDAYS, WEDNESDAYS, THURSDAYS ,SATURDAYS AND SUNDAYS  2/1/21  Provider, Historical   flecainide (TAMBOCOR) 100 mg tablet Take 100 mg by mouth two (2) times a day. 10/31/17   Provider, Historical       Allergies   Allergen Reactions    Penicillins Rash         OBJECTIVE    Visit Vitals  /70 (BP 1 Location: Left upper arm, BP Patient Position: Sitting)   Pulse 60   Temp 98.4 °F (36.9 °C) (Temporal)   Resp 18   Ht 5' 9\" (1.753 m)   Wt 197 lb 12.8 oz (89.7 kg)   SpO2 100%   BMI 29.21 kg/m²       Physical Exam  Vitals signs and nursing note reviewed. Constitutional:       General: He is not in acute distress. Appearance: He is not diaphoretic. HENT:      Right Ear: Tympanic membrane, ear canal and external ear normal.      Left Ear: There is impacted cerumen. Eyes:      Conjunctiva/sclera: Conjunctivae normal.      Pupils: Pupils are equal, round, and reactive to light. Cardiovascular:      Rate and Rhythm: Normal rate and regular rhythm. Heart sounds: Normal heart sounds. No murmur. No friction rub. No gallop. Pulmonary:      Effort: Pulmonary effort is normal. No respiratory distress. Breath sounds: Normal breath sounds. No wheezing. Skin:     General: Skin is warm and dry. Neurological:      Mental Status: He is alert. ASSESSMENT AND PLAN  Bibiana Quiñones is a 78 y.o. male who presents today for:    1. Encounter for Medicare annual wellness exam    2. HTN, goal below 140/90  Not on meds. 3. Hypothyroidism, unspecified type  Will review labs from neurologist.     4. Glucose intolerance (impaired glucose tolerance)    5. Hyperlipidemia, unspecified hyperlipidemia type    6. History of prostate cancer  - PSA, DIAGNOSTIC (PROSTATE SPECIFIC AG); Future    7. Hematuria, unspecified type  R/o UTI.  - CULTURE, URINE; Future  - URINALYSIS W/ RFLX MICROSCOPIC; Future    8. Impacted cerumen of left ear  - REMOVAL IMPACTED CERUMEN IRRIGATION/LVG UNILAT       Medications Discontinued During This Encounter   Medication Reason    warfarin (COUMADIN) 5 mg tablet Therapy Completed    warfarin (COUMADIN) 7.5 mg tablet Therapy Completed    flecainide (TAMBOCOR) 100 mg tablet Therapy Completed     Follow-up and Dispositions    · Return in about 1 year (around 2/1/2022) for Medicare Wellness Visit (30 min). Treatment risks/benefits/costs/interactions/alternatives discussed with patient. Advised patient to call back or return to office if symptoms worsen/change/persist. If patient cannot reach us or should anything more severe/urgent arise he/she should proceed directly to the nearest emergency department. Discussed expected course/resolution/complications of diagnosis in detail with patient. Patient expressed understanding with the diagnosis and plan. Annie Uribe M.D.

## 2021-02-01 NOTE — PROGRESS NOTES
This is the Subsequent Medicare Annual Wellness Exam, performed 12 months or more after the Initial AWV or the last Subsequent AWV    I have reviewed the patient's medical history in detail and updated the computerized patient record. Depression Risk Factor Screening:     3 most recent PHQ Screens 2/1/2021   Little interest or pleasure in doing things Not at all   Feeling down, depressed, irritable, or hopeless Not at all   Total Score PHQ 2 0       Alcohol Risk Screen    Do you average more than 1 drink per night or more than 7 drinks a week: No    In the past three months have you have had more than 4 drinks containing alcohol on one occasion: No        Functional Ability and Level of Safety:    Hearing: Hearing is good. The patient wears hearing aids. Activities of Daily Living: The home contains: no safety equipment. Patient does total self care      Ambulation: with mild difficulty     Fall Risk:  Fall Risk Assessment, last 12 mths 2/1/2021   Able to walk? Yes   Fall in past 12 months? 1   Do you feel unsteady? 1   Are you worried about falling 1   Is the gait abnormal? 0   Number of falls in past 12 months 2   Fall with injury? 0      Abuse Screen:  Patient is not abused       Cognitive Screening    Has your family/caregiver stated any concerns about your memory: no       Assessment/Plan   Education and counseling provided:  Are appropriate based on today's review and evaluation    Diagnoses and all orders for this visit:    1. Encounter for Medicare annual wellness exam    2. HTN, goal below 140/90    3. Hypothyroidism, unspecified type    4. Glucose intolerance (impaired glucose tolerance)    5. Hyperlipidemia, unspecified hyperlipidemia type    6. History of prostate cancer  -     PSA, DIAGNOSTIC (PROSTATE SPECIFIC AG); Future    7. Hematuria, unspecified type  -     CULTURE, URINE; Future  -     URINALYSIS W/ RFLX MICROSCOPIC; Future    8.  Impacted cerumen of left ear  -     REMOVAL IMPACTED CERUMEN IRRIGATION/LVG UNILAT        Health Maintenance Due     Health Maintenance Due   Topic Date Due    COVID-19 Vaccine (1 of 2) 07/05/1956    GLAUCOMA SCREENING Q2Y  01/29/2020       Patient Care Team   Patient Care Team:  Braden Boone MD as PCP - General (Family Medicine)  Braden Boone MD as PCP - [facility]  Jevon Mccormick MD (Cardiology)  Jocelyn Kahn MD (Dermatology)  John Mckeon MD as Physician (Ophthalmology)  Eric Nelson MD (Neurology)    History     Patient Active Problem List   Diagnosis Code    Hearing loss H91.90    History of prostate cancer Z85.46    Hypothyroidism E03.9    HTN, goal below 140/90 I10    Hematuria, microscopic R31.29    Rectus diastasis M62.08    ED (erectile dysfunction) N52.9    Tear of meniscus of right knee S83.206A    Left inguinal hernia K40.90    Seborrheic dermatitis, unspecified L21.9    Keratoacanthoma L85.8    Degenerative arthritis of left knee M17.12    Degenerative arthritis of hip M16.9    Unspecified adverse effect of anesthesia T41.45XA    Atrial fibrillation ([address] I48.91    Aortic insufficiency I35.1    Primary localized osteoarthritis of left knee M17.12    Primary osteoarthritis of left knee M17.12     Past Medical History:   Diagnosis Date    Aortic insufficiency     Arthritis     Atrial fibrillation ([address]     Chronic pain     Hearing loss 4/1/2010    Hematuria, microscopic 4/1/2010    Hypothyroidism 4/1/2010    On warfarin therapy     Prostate CA ([address] 1992    Rectus diastasis 4/1/2010    Seborrheic dermatitis, unspecified 4/1/2010    Unspecified adverse effect of anesthesia     DIFFICULT TO AWAKEN      Past Surgical History:   Procedure Laterality Date    ENDOSCOPY, COLON, DIAGNOSTIC  11/19/07    w/polypectomy    HX GI      COLONOSCOPY    HX GI      RECTAL FISTULA REPAIR    HX HEENT  [id number]    BCCA SCALP,NECK ,FACE     HX HEENT Right 01/2019    CAUTERY,NOSE     HX HERNIA REPAIR  2004    hernia-    HX KNEE ARTHROSCOPY  7/08    MENISCECTOMY LEFT    HX KNEE ARTHROSCOPY      LEFT AND RIGHT ARTHROSCOPY    HX ORTHOPAEDIC  2012    BONE SPUR REMOVAL     HX RADICAL PROSTATECTOMY  1994    HX TONSILLECTOMY  1943     Current Outpatient Medications   Medication Sig Dispense Refill    levothyroxine (SYNTHROID) 150 mcg tablet TAKE 1 TABLET BEFORE BREAKFAST 90 Tab 3    rivaroxaban (XARELTO) 15 mg tab tablet       multivitamin (ONE A DAY) tablet Take 1 Tab by mouth daily.  OTHER Take 2 Tabs by mouth every evening. fibercon/calciun       ASCORBIC ACID (VITAMIN C PO) Take 500 mg by mouth daily (after breakfast). Allergies   Allergen Reactions    Penicillins Rash       Family History   Problem Relation Age of Onset   Britt Jaden Stroke Mother     Heart Disease Father         CHF    Diabetes Father     Cancer Sister         breast    Thyroid Disease Sister     Diabetes Daughter 5        insulin-dependent    Anesth Problems Neg Hx      Social History     Tobacco Use    Smoking status: Never Smoker    Smokeless tobacco: Never Used   Substance Use Topics    Alcohol use:  Yes     Alcohol/week: 5.0 standard drinks     Types: 2 Glasses of wine, 4 Standard drinks or equivalent per week     Comment: moderate

## 2021-02-01 NOTE — PROGRESS NOTES
Chief Complaint   Patient presents with   [facility] Annual Wellness Visit     Eligible for        1. Have you been to the ER, urgent care clinic since your last visit? Hospitalized since your last visit? 2. Have you seen or consulted any other health care providers outside of the [address] [place] [address] since your last visit? Include any pap smears or colon screening. 3 most recent PHQ Screens 2/1/2021   Little interest or pleasure in doing things Not at all   Feeling down, depressed, irritable, or hopeless Not at all   Total Score PHQ 2 0       Fall Risk Assessment, last 12 mths 2/1/2021   Able to walk? Yes   Fall in past 12 months? 1   Do you feel unsteady? 1   Are you worried about falling 1   Is the gait abnormal? 0   Number of falls in past 12 months 2   Fall with injury? 0             ADL Assessment 2/1/2021   Feeding yourself No Help Needed   Getting from bed to chair No Help Needed   Getting dressed No Help Needed   Bathing or showering No Help Needed   Walk across the room (includes cane/walker) No Help Needed   Using the telphone No Help Needed   Taking your medications No Help Needed   Preparing meals No Help Needed   Managing money (expenses/bills) No Help Needed   Moderately strenuous housework (laundry) No Help Needed   Shopping for personal items (toiletries/medicines) No Help Needed   Shopping for groceries No Help Needed   Driving No Help Needed   Climbing a flight of stairs No Help Needed   Getting to places beyond walking distances No Help Needed       Abuse Screening Questionnaire 2/1/2021   Do you ever feel afraid of your partner? N   Are you in a relationship with someone who physically or mentally threatens you? N   Is it safe for you to go home?  Y       Health Maintenance Due   Topic Date Due    COVID-19 Vaccine (1 of 2) 07/05/1956    GLAUCOMA SCREENING Q2Y  01/29/2020    Flu Vaccine (1) 09/01/2020    Medicare Yearly Exam  01/30/2021

## 2021-02-03 LAB
APPEARANCE UR: CLEAR
BACTERIA SPEC CULT: NORMAL
BACTERIA URNS QL MICRO: NEGATIVE /HPF
BILIRUB UR QL: NEGATIVE
COLOR UR: NORMAL
EPITH CASTS URNS QL MICRO: NORMAL /LPF
GLUCOSE UR STRIP.AUTO-MCNC: NEGATIVE MG/DL
HGB UR QL STRIP: NEGATIVE
HYALINE CASTS URNS QL MICRO: NORMAL /LPF
KETONES UR QL STRIP.AUTO: NEGATIVE MG/DL
LEUKOCYTE ESTERASE UR QL STRIP.AUTO: NEGATIVE
NITRITE UR QL STRIP.AUTO: NEGATIVE
PH UR STRIP: 6 [PH]
PROT UR STRIP-MCNC: 100 MG/DL
RBC #/AREA URNS HPF: NORMAL /HPF
SERVICE CMNT-IMP: NORMAL
SP GR UR REFRACTOMETRY: 1.01
UROBILINOGEN UR QL STRIP.AUTO: 0.2 EU/DL
WBC URNS QL MICRO: NORMAL /HPF

## 2021-08-16 RX ORDER — LEVOTHYROXINE SODIUM 150 UG/1
TABLET ORAL
Qty: 90 TABLET | Refills: 3 | Status: SHIPPED | OUTPATIENT
Start: 2021-08-16 | End: 2022-08-25

## 2022-03-19 PROBLEM — M17.12 PRIMARY OSTEOARTHRITIS OF LEFT KNEE: Status: ACTIVE | Noted: 2019-05-16

## 2022-03-20 PROBLEM — M17.12 PRIMARY LOCALIZED OSTEOARTHRITIS OF LEFT KNEE: Status: ACTIVE | Noted: 2019-05-10

## 2022-03-30 ENCOUNTER — TELEPHONE (OUTPATIENT)
Dept: FAMILY MEDICINE CLINIC | Age: 82
End: 2022-03-30

## 2022-03-30 NOTE — TELEPHONE ENCOUNTER
----- Message from [facility] sent at 3/29/2022  4:10 PM EDT -----  Subject: Message to Provider    QUESTIONS  Information for Provider? pt is wanting to verify if fasting is required   on day of or before 4/14/22.   ---------------------------------------------------------------------------  --------------  [address]  What is the best way for the office to contact you? OK to leave message on   voicemail  Preferred Call Back Phone Number? [phone]  ---------------------------------------------------------------------------  --------------  SCRIPT ANSWERS  Relationship to Patient?  Self

## 2022-03-30 NOTE — TELEPHONE ENCOUNTER
Called patient and confirmed two identifiers. Advised patient to fast 12 hrs before appointment drinking only water and black coffee. Informed the patient he can still take his medications the morning of appointment. Patient verbalized understanding.

## 2022-04-14 ENCOUNTER — OFFICE VISIT (OUTPATIENT)
Dept: FAMILY MEDICINE CLINIC | Age: 82
End: 2022-04-14
Payer: MEDICARE

## 2022-04-14 VITALS
DIASTOLIC BLOOD PRESSURE: 62 MMHG | WEIGHT: 199 LBS | TEMPERATURE: 98.6 F | HEART RATE: 65 BPM | OXYGEN SATURATION: 96 % | BODY MASS INDEX: 29.47 KG/M2 | SYSTOLIC BLOOD PRESSURE: 110 MMHG | HEIGHT: 69 IN | RESPIRATION RATE: 16 BRPM

## 2022-04-14 DIAGNOSIS — I10 HTN, GOAL BELOW 140/90: ICD-10-CM

## 2022-04-14 DIAGNOSIS — I48.91 ATRIAL FIBRILLATION, UNSPECIFIED TYPE (HCC): ICD-10-CM

## 2022-04-14 DIAGNOSIS — M65.321 TRIGGER INDEX FINGER OF RIGHT HAND: ICD-10-CM

## 2022-04-14 DIAGNOSIS — M72.0 DUPUYTREN CONTRACTURE: ICD-10-CM

## 2022-04-14 DIAGNOSIS — R73.02 GLUCOSE INTOLERANCE (IMPAIRED GLUCOSE TOLERANCE): ICD-10-CM

## 2022-04-14 DIAGNOSIS — E03.9 HYPOTHYROIDISM, UNSPECIFIED TYPE: ICD-10-CM

## 2022-04-14 DIAGNOSIS — Z85.46 HISTORY OF PROSTATE CANCER: ICD-10-CM

## 2022-04-14 DIAGNOSIS — Z00.00 ENCOUNTER FOR MEDICARE ANNUAL WELLNESS EXAM: Primary | ICD-10-CM

## 2022-04-14 DIAGNOSIS — E78.5 HYPERLIPIDEMIA, UNSPECIFIED HYPERLIPIDEMIA TYPE: ICD-10-CM

## 2022-04-14 LAB
ALBUMIN SERPL-MCNC: 3.8 G/DL (ref 3.5–5)
ALBUMIN/GLOB SERPL: 1.3 {RATIO} (ref 1.1–2.2)
ALP SERPL-CCNC: 69 U/L (ref 45–117)
ALT SERPL-CCNC: 25 U/L (ref 12–78)
ANION GAP SERPL CALC-SCNC: 5 MMOL/L (ref 5–15)
AST SERPL-CCNC: 17 U/L (ref 15–37)
BILIRUB SERPL-MCNC: 0.8 MG/DL (ref 0.2–1)
BUN SERPL-MCNC: 35 MG/DL (ref 6–20)
BUN/CREAT SERPL: 21 (ref 12–20)
CALCIUM SERPL-MCNC: 9.5 MG/DL (ref 8.5–10.1)
CHLORIDE SERPL-SCNC: 107 MMOL/L (ref 97–108)
CHOLEST SERPL-MCNC: 166 MG/DL
CO2 SERPL-SCNC: 28 MMOL/L (ref 21–32)
CREAT SERPL-MCNC: 1.69 MG/DL (ref 0.7–1.3)
ERYTHROCYTE [DISTWIDTH] IN BLOOD BY AUTOMATED COUNT: 13.5 % (ref 11.5–14.5)
EST. AVERAGE GLUCOSE BLD GHB EST-MCNC: 111 MG/DL
GLOBULIN SER CALC-MCNC: 3 G/DL (ref 2–4)
GLUCOSE SERPL-MCNC: 105 MG/DL (ref 65–100)
HBA1C MFR BLD: 5.5 % (ref 4–5.6)
HCT VFR BLD AUTO: 46.2 % (ref 36.6–50.3)
HDLC SERPL-MCNC: 57 MG/DL
HDLC SERPL: 2.9 {RATIO} (ref 0–5)
HGB BLD-MCNC: 15.3 G/DL (ref 12.1–17)
LDLC SERPL CALC-MCNC: 99.2 MG/DL (ref 0–100)
MCH RBC QN AUTO: 33.8 PG (ref 26–34)
MCHC RBC AUTO-ENTMCNC: 33.1 G/DL (ref 30–36.5)
MCV RBC AUTO: 102 FL (ref 80–99)
NRBC # BLD: 0 K/UL (ref 0–0.01)
NRBC BLD-RTO: 0 PER 100 WBC
PLATELET # BLD AUTO: 157 K/UL (ref 150–400)
PMV BLD AUTO: 10.5 FL (ref 8.9–12.9)
POTASSIUM SERPL-SCNC: 5 MMOL/L (ref 3.5–5.1)
PROT SERPL-MCNC: 6.8 G/DL (ref 6.4–8.2)
PSA SERPL-MCNC: 0 NG/ML (ref 0.01–4)
RBC # BLD AUTO: 4.53 M/UL (ref 4.1–5.7)
SODIUM SERPL-SCNC: 140 MMOL/L (ref 136–145)
T4 SERPL-MCNC: 10 UG/DL (ref 4.5–12.1)
TRIGL SERPL-MCNC: 49 MG/DL (ref ?–150)
TSH SERPL DL<=0.05 MIU/L-ACNC: 2.39 UIU/ML (ref 0.36–3.74)
VLDLC SERPL CALC-MCNC: 9.8 MG/DL
WBC # BLD AUTO: 6.6 K/UL (ref 4.1–11.1)

## 2022-04-14 PROCEDURE — 1101F PT FALLS ASSESS-DOCD LE1/YR: CPT | Performed by: FAMILY MEDICINE

## 2022-04-14 PROCEDURE — 99214 OFFICE O/P EST MOD 30 MIN: CPT | Performed by: FAMILY MEDICINE

## 2022-04-14 PROCEDURE — G8536 NO DOC ELDER MAL SCRN: HCPCS | Performed by: FAMILY MEDICINE

## 2022-04-14 PROCEDURE — G0439 PPPS, SUBSEQ VISIT: HCPCS | Performed by: FAMILY MEDICINE

## 2022-04-14 PROCEDURE — G0463 HOSPITAL OUTPT CLINIC VISIT: HCPCS | Performed by: FAMILY MEDICINE

## 2022-04-14 PROCEDURE — G8427 DOCREV CUR MEDS BY ELIG CLIN: HCPCS | Performed by: FAMILY MEDICINE

## 2022-04-14 PROCEDURE — G8752 SYS BP LESS 140: HCPCS | Performed by: FAMILY MEDICINE

## 2022-04-14 PROCEDURE — G8419 CALC BMI OUT NRM PARAM NOF/U: HCPCS | Performed by: FAMILY MEDICINE

## 2022-04-14 PROCEDURE — G8510 SCR DEP NEG, NO PLAN REQD: HCPCS | Performed by: FAMILY MEDICINE

## 2022-04-14 PROCEDURE — G8754 DIAS BP LESS 90: HCPCS | Performed by: FAMILY MEDICINE

## 2022-04-14 NOTE — PROGRESS NOTES
Patient Name: Sulma Triana   MRN: [id number]    Drew Gomes is a 78 y.o. male who presents with the following:     Has developed some scarring in his left palm. Also believes he has trigger finger in his right index finger which limits his mobility. He underwent an ablation as well as a cardioversion for his atrial fibrillation. Now on Eliquis; Xarelto was discontinued. Feels well. BP Readings from Last 3 Encounters:   04/14/22 110/62   02/01/21 118/70   01/07/21 (!) 149/68     Wt Readings from Last 3 Encounters:   04/14/22 199 lb (90.3 kg)   02/01/21 197 lb 12.8 oz (89.7 kg)   01/07/21 198 lb (89.8 kg)     Review of Systems   Constitutional: Negative for fever, malaise/fatigue and weight loss. Respiratory: Negative for cough, hemoptysis, shortness of breath and wheezing. Cardiovascular: Negative for chest pain, palpitations, leg swelling and PND. Gastrointestinal: Negative for abdominal pain, constipation, diarrhea, nausea and vomiting. Musculoskeletal: Positive for joint pain. The patient's medications, allergies, past medical history, surgical history, family history and social history were reviewed and updated where appropriate. Current Outpatient Medications:     apixaban (Eliquis) 2.5 mg tablet, Take 1 Tablet by mouth., Disp: , Rfl:     levothyroxine (SYNTHROID) 150 mcg tablet, TAKE 1 TABLET BEFORE BREAKFAST, Disp: 90 Tablet, Rfl: 3    multivitamin (ONE A DAY) tablet, Take 1 Tab by mouth daily. , Disp: , Rfl:     OTHER, Take 2 Tabs by mouth every evening. fibercon/calciun , Disp: , Rfl:     ASCORBIC ACID (VITAMIN C PO), Take 500 mg by mouth daily (after breakfast). , Disp: , Rfl:     rivaroxaban (XARELTO) 15 mg tab tablet, , Disp: , Rfl:     Allergies   Allergen Reactions    Penicillins Rash         OBJECTIVE    Visit Vitals  /62 (BP 1 Location: Right arm, BP Patient Position: Sitting, BP Cuff Size: Adult)   Pulse 65   Temp 98.6 °F (37 °C) (Temporal) Resp 16   Ht 5' 9\" (1.753 m)   Wt 199 lb (90.3 kg)   SpO2 96%   BMI 29.39 kg/m²       Physical Exam  Vitals and nursing note reviewed. Constitutional:       General: He is not in acute distress. Appearance: He is not diaphoretic. Eyes:      Conjunctiva/sclera: Conjunctivae normal.      Pupils: Pupils are equal, round, and reactive to light. Cardiovascular:      Rate and Rhythm: Normal rate and regular rhythm. Heart sounds: Normal heart sounds. No murmur heard. No friction rub. No gallop. Pulmonary:      Effort: Pulmonary effort is normal. No respiratory distress. Breath sounds: Normal breath sounds. No wheezing. Musculoskeletal:      Right hand: Deformity (contracture of right 2nd digit flexor tendon) present. No swelling. Decreased range of motion (limited flexion/extenion of 2nd finger). Left hand: Deformity (contracture of left palm) present. No swelling. Skin:     General: Skin is warm and dry. Neurological:      Mental Status: He is alert. ASSESSMENT AND PLAN  Tonya Dutta is a 78 y.o. male who presents today for:    1. Encounter for Medicare annual wellness exam    2. Atrial fibrillation, unspecified type ([address]  Stable, continue current treatment. 3. HTN, goal below 140/90  Stable, continue current treatment. 4. Hypothyroidism, unspecified type  - TSH 3RD GENERATION; Future  - T4 (THYROXINE); Future  - T4 (THYROXINE)  - TSH 3RD GENERATION    5. Glucose intolerance (impaired glucose tolerance)  - HEMOGLOBIN A1C WITH EAG; Future  - HEMOGLOBIN A1C WITH EAG    6. History of prostate cancer  - PSA, DIAGNOSTIC (PROSTATE SPECIFIC AG); Future  - PSA, DIAGNOSTIC (PROSTATE SPECIFIC AG)    7. Hyperlipidemia, unspecified hyperlipidemia type  - METABOLIC PANEL, COMPREHENSIVE; Future  - CBC W/O DIFF; Future  - LIPID PANEL; Future  - CBC W/O DIFF  - LIPID PANEL  - METABOLIC PANEL, COMPREHENSIVE    8. Dupuytren contracture  - REFERRAL TO ORTHOPEDIC SURGERY    9.  Trigger index finger of right hand  - REFERRAL TO ORTHOPEDIC SURGERY       Medications Discontinued During This Encounter   Medication Reason    rivaroxaban (XARELTO) 15 mg tab tablet LIST CLEANUP           Treatment risks/benefits/costs/interactions/alternatives discussed with patient. Advised patient to call back or return to office if symptoms worsen/change/persist. If patient cannot reach us or should anything more severe/urgent arise he/she should proceed directly to the nearest emergency department. Discussed expected course/resolution/complications of diagnosis in detail with patient. Patient expressed understanding with the diagnosis and plan. This dictation may have been completed with Dragon, the [employer] voice recognition software. Unanticipated grammatical, syntax, homophones, and other interpretive errors are sometimes inadvertently transcribed by the computer software. Please disregard any errors that have escaped final proofreading. Annie Menendez M.D.

## 2022-04-14 NOTE — PROGRESS NOTES
This is the Subsequent Medicare Annual Wellness Exam, performed 12 months or more after the Initial AWV or the last Subsequent AWV    I have reviewed the patient's medical history in detail and updated the computerized patient record. Assessment/Plan   Education and counseling provided:  Are appropriate based on today's review and evaluation    1. Encounter for Medicare annual wellness exam  2. Atrial fibrillation, unspecified type ([address]  3. HTN, goal below 140/90  4. Hypothyroidism, unspecified type  -     TSH 3RD GENERATION; Future  -     T4 (THYROXINE); Future  5. Glucose intolerance (impaired glucose tolerance)  -     HEMOGLOBIN A1C WITH EAG; Future  6. History of prostate cancer  -     PSA, DIAGNOSTIC (PROSTATE SPECIFIC AG); Future  7. Hyperlipidemia, unspecified hyperlipidemia type  -     METABOLIC PANEL, COMPREHENSIVE; Future  -     CBC W/O DIFF; Future  -     LIPID PANEL; Future  8. Dupuytren contracture  -     REFERRAL TO ORTHOPEDIC SURGERY  9. Trigger index finger of right hand  -     REFERRAL TO ORTHOPEDIC SURGERY       Depression Risk Factor Screening     3 most recent PHQ Screens 4/14/2022   Little interest or pleasure in doing things Not at all   Feeling down, depressed, irritable, or hopeless Not at all   Total Score PHQ 2 0       Alcohol & Drug Abuse Risk Screen    Do you average more than 1 drink per night or more than 7 drinks a week: No    In the past three months have you have had more than 4 drinks containing alcohol on one occasion: No          Functional Ability and Level of Safety    Hearing: Hearing is good. Activities of Daily Living: The home contains: no safety equipment.   ADL Assessment 4/14/2022   Feeding yourself No Help Needed   Getting from bed to chair No Help Needed   Getting dressed No Help Needed   Bathing or showering No Help Needed   Walk across the room (includes cane/walker) No Help Needed   Using the telphone No Help Needed   Taking your medications No Help Needed   Preparing meals No Help Needed   Managing money (expenses/bills) No Help Needed   Moderately strenuous housework (laundry) No Help Needed   Shopping for personal items (toiletries/medicines) No Help Needed   Shopping for groceries No Help Needed   Driving No Help Needed   Climbing a flight of stairs No Help Needed   Getting to places beyond walking distances No Help Needed         Ambulation: with no difficulty     Fall Risk:  Fall Risk Assessment, last 12 mths 4/14/2022   Able to walk? Yes   Fall in past 12 months? 1   Do you feel unsteady? 1   Are you worried about falling 0   Is TUG test greater than 12 seconds? 0   Is the gait abnormal? 0   Number of falls in past 12 months 2   Fall with injury?  0      Abuse Screen:  Patient is not abused       Cognitive Screening    Has your family/caregiver stated any concerns about your memory: no       Health Maintenance Due     Health Maintenance Due   Topic Date Due    COVID-19 Vaccine (1) Never done    Depression Screen  02/01/2022    Medicare Yearly Exam  02/02/2022       Patient Care Team   Patient Care Team:  Stephon Dozier MD as PCP - General (Family Medicine)  Stephon Dozier MD as PCP - [facility] Empaneled Provider  Trenton Li MD (Dermatology)  Saskia Hart MD as Physician (Ophthalmology)  Min Ritchie MD (Neurology)  Jass Pete MD as Physician (Cardio Vascular Surgery)    History     Patient Active Problem List   Diagnosis Code    Hearing loss H91.90    History of prostate cancer Z85.46    Hypothyroidism E03.9    HTN, goal below 140/90 I10    Hematuria, microscopic R31.29    Rectus diastasis M62.08    ED (erectile dysfunction) N52.9    Tear of meniscus of right knee S83.206A    Left inguinal hernia K40.90    Seborrheic dermatitis, unspecified L21.9    Keratoacanthoma L85.8    Degenerative arthritis of left knee M17.12    Degenerative arthritis of hip M16.9    Unspecified adverse effect of anesthesia T41.45XA    Atrial fibrillation ([facility] I48.91    Aortic insufficiency I35.1    Primary localized osteoarthritis of left knee M17.12    Primary osteoarthritis of left knee M17.12     Past Medical History:   Diagnosis Date    Aortic insufficiency     Arthritis     Atrial fibrillation (HCC)     Chronic pain     Hearing loss 4/1/2010    Hematuria, microscopic 4/1/2010    Hypothyroidism 4/1/2010    On warfarin therapy     Prostate CA ([facility] [address] 1992    Rectus diastasis 4/1/2010    Seborrheic dermatitis, unspecified 4/1/2010    Unspecified adverse effect of anesthesia     DIFFICULT TO AWAKEN      Past Surgical History:   Procedure Laterality Date    ENDOSCOPY, COLON, DIAGNOSTIC  11/19/07    w/polypectomy    HX GI      COLONOSCOPY    HX GI      RECTAL FISTULA REPAIR    HX HEENT  [id number]    BCCA SCALP,NECK ,FACE     HX HEENT Right 01/2019    CAUTERY,NOSE     HX HERNIA REPAIR  2004    hernia-    HX KNEE ARTHROSCOPY  7/08    MENISCECTOMY LEFT    HX KNEE ARTHROSCOPY      LEFT AND RIGHT ARTHROSCOPY    HX ORTHOPAEDIC  2012    BONE SPUR REMOVAL     HX RADICAL PROSTATECTOMY  1994    HX TONSILLECTOMY  1943     Current Outpatient Medications   Medication Sig Dispense Refill    apixaban (Eliquis) 2.5 mg tablet Take 1 Tablet by mouth.  levothyroxine (SYNTHROID) 150 mcg tablet TAKE 1 TABLET BEFORE BREAKFAST 90 Tablet 3    multivitamin (ONE A DAY) tablet Take 1 Tab by mouth daily.  OTHER Take 2 Tabs by mouth every evening. fibercon/calciun       ASCORBIC ACID (VITAMIN C PO) Take 500 mg by mouth daily (after breakfast).        Allergies   Allergen Reactions    Penicillins Rash       Family History   Problem Relation Age of Onset   Deisy Larger Stroke Mother     Heart Disease Father         CHF    Diabetes Father     Cancer Sister         breast    Thyroid Disease Sister     Diabetes Daughter 5        insulin-dependent    Anesth Problems Neg Hx      Social History     Tobacco Use    Smoking status: Never Smoker    Smokeless tobacco: Never Used   Substance Use Topics    Alcohol use:  Yes     Alcohol/week: 5.0 standard drinks     Types: 2 Glasses of wine, 4 Standard drinks or equivalent per week     Comment: ira Montenegro MD

## 2022-04-14 NOTE — PROGRESS NOTES
Chief Complaint   Patient presents with   Yanique De La Torre Annual Wellness Visit       1. Have you been to the ER, urgent care clinic since your last visit? Hospitalized since your last visit? 2. Have you seen or consulted any other health care providers outside of the [address] [place] [address] [place] since your last visit? Include any pap smears or colon screening. Yes When: summer 2021 Where: Dr. Tadeo Worthy  Reason for visit: ablation and cardioversion    3. For patients over 45: Has the patient had a colonoscopy? NA - based on age     If the patient is female:    4. For patients over 36: Has the patient had a mammogram? NA - based on age    11. For patients over 21: Has the patient had a pap smear? NA - based on age    1 most recent PHQ Screens 4/14/2022   Little interest or pleasure in doing things Not at all   Feeling down, depressed, irritable, or hopeless Not at all   Total Score PHQ 2 0       Fall Risk Assessment, last 12 mths 4/14/2022   Able to walk? Yes   Fall in past 12 months? 1   Do you feel unsteady? 1   Are you worried about falling 0   Is TUG test greater than 12 seconds? 0   Is the gait abnormal? 0   Number of falls in past 12 months 2   Fall with injury?  0       ADL Assessment 4/14/2022   Feeding yourself No Help Needed   Getting from bed to chair No Help Needed   Getting dressed No Help Needed   Bathing or showering No Help Needed   Walk across the room (includes cane/walker) No Help Needed   Using the telphone No Help Needed   Taking your medications No Help Needed   Preparing meals No Help Needed   Managing money (expenses/bills) No Help Needed   Moderately strenuous housework (laundry) No Help Needed   Shopping for personal items (toiletries/medicines) No Help Needed   Shopping for groceries No Help Needed   Driving No Help Needed   Climbing a flight of stairs No Help Needed   Getting to places beyond walking distances No Help Needed       Abuse Screening Questionnaire 4/14/2022   Do you ever feel afraid of your partner? N   Are you in a relationship with someone who physically or mentally threatens you? N   Is it safe for you to go home?  Darolyn Merlin

## 2022-04-15 NOTE — PROGRESS NOTES
Please send a LETTER with the following: Your labs remain stable/normal including thyroid, prostate marker, cholesterol and sugar. Follow up in one year or sooner as needed.

## 2022-08-25 RX ORDER — LEVOTHYROXINE SODIUM 150 UG/1
TABLET ORAL
Qty: 90 TABLET | Refills: 3 | Status: SHIPPED | OUTPATIENT
Start: 2022-08-25

## 2023-04-17 ENCOUNTER — OFFICE VISIT (OUTPATIENT)
Dept: FAMILY MEDICINE CLINIC | Age: 83
End: 2023-04-17
Payer: MEDICARE

## 2023-04-17 VITALS
TEMPERATURE: 97.4 F | WEIGHT: 202.4 LBS | HEIGHT: 69 IN | RESPIRATION RATE: 16 BRPM | BODY MASS INDEX: 29.98 KG/M2 | SYSTOLIC BLOOD PRESSURE: 130 MMHG | HEART RATE: 64 BPM | DIASTOLIC BLOOD PRESSURE: 70 MMHG | OXYGEN SATURATION: 97 %

## 2023-04-17 DIAGNOSIS — I10 HTN, GOAL BELOW 140/90: ICD-10-CM

## 2023-04-17 DIAGNOSIS — Z85.46 HISTORY OF PROSTATE CANCER: ICD-10-CM

## 2023-04-17 DIAGNOSIS — G89.29 CHRONIC MIDLINE LOW BACK PAIN WITHOUT SCIATICA: ICD-10-CM

## 2023-04-17 DIAGNOSIS — R73.02 GLUCOSE INTOLERANCE (IMPAIRED GLUCOSE TOLERANCE): ICD-10-CM

## 2023-04-17 DIAGNOSIS — Z00.00 ENCOUNTER FOR MEDICARE ANNUAL WELLNESS EXAM: Primary | ICD-10-CM

## 2023-04-17 DIAGNOSIS — I48.91 ATRIAL FIBRILLATION, UNSPECIFIED TYPE (HCC): ICD-10-CM

## 2023-04-17 DIAGNOSIS — Z99.89 OSA ON CPAP: ICD-10-CM

## 2023-04-17 DIAGNOSIS — G47.33 OSA ON CPAP: ICD-10-CM

## 2023-04-17 DIAGNOSIS — E78.5 HYPERLIPIDEMIA, UNSPECIFIED HYPERLIPIDEMIA TYPE: ICD-10-CM

## 2023-04-17 DIAGNOSIS — R26.89 BALANCE PROBLEM: ICD-10-CM

## 2023-04-17 DIAGNOSIS — E03.9 HYPOTHYROIDISM, UNSPECIFIED TYPE: ICD-10-CM

## 2023-04-17 DIAGNOSIS — G45.3 AMAUROSIS FUGAX OF RIGHT EYE: ICD-10-CM

## 2023-04-17 DIAGNOSIS — M54.50 CHRONIC MIDLINE LOW BACK PAIN WITHOUT SCIATICA: ICD-10-CM

## 2023-04-17 PROCEDURE — G8417 CALC BMI ABV UP PARAM F/U: HCPCS | Performed by: FAMILY MEDICINE

## 2023-04-17 PROCEDURE — G0439 PPPS, SUBSEQ VISIT: HCPCS | Performed by: FAMILY MEDICINE

## 2023-04-17 PROCEDURE — G8432 DEP SCR NOT DOC, RNG: HCPCS | Performed by: FAMILY MEDICINE

## 2023-04-17 PROCEDURE — G8536 NO DOC ELDER MAL SCRN: HCPCS | Performed by: FAMILY MEDICINE

## 2023-04-17 PROCEDURE — 1123F ACP DISCUSS/DSCN MKR DOCD: CPT | Performed by: FAMILY MEDICINE

## 2023-04-17 PROCEDURE — G8427 DOCREV CUR MEDS BY ELIG CLIN: HCPCS | Performed by: FAMILY MEDICINE

## 2023-04-17 PROCEDURE — 3075F SYST BP GE 130 - 139MM HG: CPT | Performed by: FAMILY MEDICINE

## 2023-04-17 PROCEDURE — G0463 HOSPITAL OUTPT CLINIC VISIT: HCPCS | Performed by: FAMILY MEDICINE

## 2023-04-17 PROCEDURE — 99214 OFFICE O/P EST MOD 30 MIN: CPT | Performed by: FAMILY MEDICINE

## 2023-04-17 PROCEDURE — 3078F DIAST BP <80 MM HG: CPT | Performed by: FAMILY MEDICINE

## 2023-04-17 PROCEDURE — 1101F PT FALLS ASSESS-DOCD LE1/YR: CPT | Performed by: FAMILY MEDICINE

## 2023-04-17 NOTE — PROGRESS NOTES
Patient Name: Sonya Miller   MRN: [id number]    Rupert Mon is a 78 y.o. male who presents with the following:     Patient reports intermittent episodes of feeling like a curtain comes down over his right vision field. He has seen his ophthalmologist for this who ruled out a primary vision disorder and recommended for him to get a carotid Doppler. He is awaiting to hear back from his cardiologist for this test.  He denies any strokelike symptoms or chest pain during these episodes. Will usually about last about 5 minutes. He  also reports ongoing balance issues. He states he tends to favor the left side. He does exercise at the [facility] about every other day. Also reports lower left back pain that is intermittent. Denies any recent falls. He also has a history of a left knee replacement about 5 years ago and is wondering if this is also affecting his balance. Diagnosed with CHIKA on CPAP machine since November. Has had difficulty sleeping well with the mask; working with the sleep clinic to adjust the settings so that he can sleep more comfortably. Review of Systems   Constitutional:  Negative for fever, malaise/fatigue and weight loss. Eyes:         Intermittent vision loss   Respiratory:  Negative for cough, hemoptysis, shortness of breath and wheezing. Cardiovascular:  Negative for chest pain, palpitations, leg swelling and PND. Gastrointestinal:  Negative for abdominal pain, constipation, diarrhea, nausea and vomiting. Musculoskeletal:  Positive for back pain. Negative for falls. The patient's medications, allergies, past medical history, surgical history, family history and social history were reviewed and updated where appropriate.       Current Outpatient Medications:     levothyroxine (SYNTHROID) 150 mcg tablet, TAKE 1 TABLET BEFORE BREAKFAST, Disp: 90 Tablet, Rfl: 3    apixaban (ELIQUIS) 2.5 mg tablet, Take 1 Tablet by mouth., Disp: , Rfl:     multivitamin (ONE A DAY) tablet, Take 1 Tablet by mouth daily. , Disp: , Rfl:     OTHER, Take 2 Tabs by mouth every evening. fibercon/calciun , Disp: , Rfl:     ASCORBIC ACID (VITAMIN C PO), Take 500 mg by mouth daily (after breakfast). , Disp: , Rfl:     Allergies   Allergen Reactions    Penicillins Rash         OBJECTIVE    Visit Vitals  /70 (BP 1 Location: Left upper arm, BP Patient Position: Sitting, BP Cuff Size: Adult)   Pulse 64   Temp 97.4 °F (36.3 °C) (Temporal)   Resp 16   Ht 5' 9\" (1.753 m)   Wt 202 lb 6.4 oz (91.8 kg)   SpO2 97%   BMI 29.89 kg/m²       Physical Exam  Vitals and nursing note reviewed. Constitutional:       General: He is not in acute distress. Appearance: He is not diaphoretic. Eyes:      Conjunctiva/sclera: Conjunctivae normal.      Pupils: Pupils are equal, round, and reactive to light. Cardiovascular:      Rate and Rhythm: Normal rate and regular rhythm. Pulses:           Carotid pulses are 2+ on the right side and 2+ on the left side. Heart sounds: Normal heart sounds. No murmur heard. No friction rub. No gallop. Pulmonary:      Effort: Pulmonary effort is normal. No respiratory distress. Breath sounds: Normal breath sounds. No wheezing. Musculoskeletal:      Lumbar back: Tenderness (TTP along lower left flank) present. No swelling, deformity or bony tenderness. Skin:     General: Skin is warm and dry. Neurological:      Mental Status: He is alert. ASSESSMENT AND PLAN  Yung Beverly is a 78 y.o. male who presents today for:    1. Encounter for Medicare annual wellness exam    2. Amaurosis fugax of right eye  Pt to have carotid dopplers done; he will request this to his cardiologist. May want to consider brain MRI if persistent. Reviewed signs and symptoms that would indicate a worsening medical condition which would require immediate evaluation and treatment; patient expressed understanding of plan. 3. Balance problem  Recommend PT. Evaluation as above.   - REFERRAL TO PHYSICAL THERAPY    4. Chronic midline low back pain without sciatica  Will obtain baseline xray; PT as above. - XR SPINE LUMB 2 OR 3 V; Future    5. Atrial fibrillation, unspecified type ([address]  Stable, continue current treatment. 6. HTN, goal below 140/90    7. Hypothyroidism, unspecified type  - TSH 3RD GENERATION; Future  - T4 (THYROXINE); Future  - T4 (THYROXINE)  - TSH 3RD GENERATION    8. Glucose intolerance (impaired glucose tolerance)  - HEMOGLOBIN A1C WITH EAG; Future  - HEMOGLOBIN A1C WITH EAG    9. History of prostate cancer  - PSA, DIAGNOSTIC (PROSTATE SPECIFIC AG); Future  - PSA, DIAGNOSTIC (PROSTATE SPECIFIC AG)    10. Hyperlipidemia, unspecified hyperlipidemia type  - METABOLIC PANEL, COMPREHENSIVE; Future  - CBC W/O DIFF; Future  - LIPID PANEL; Future  - LIPID PANEL  - CBC W/O DIFF  - METABOLIC PANEL, COMPREHENSIVE    11. CHIKA on CPAP  Pt to follow up with sleep clinic. There are no discontinued medications. Treatment risks/benefits/costs/interactions/alternatives discussed with patient. Advised patient to call back or return to office if symptoms worsen/change/persist. If patient cannot reach us or should anything more severe/urgent arise he/she should proceed directly to the nearest emergency department. Discussed expected course/resolution/complications of diagnosis in detail with patient. Patient expressed understanding with the diagnosis and plan. This dictation may have been completed with Dragon, the [employer] voice recognition software. Unanticipated grammatical, syntax, homophones, and other interpretive errors are sometimes inadvertently transcribed by the computer software. Please disregard any errors that have escaped final proofreading. Annie Nina M.D.

## 2023-04-17 NOTE — PROGRESS NOTES
This is the Subsequent Medicare Annual Wellness Exam, performed 12 months or more after the Initial AWV or the last Subsequent AWV    I have reviewed the patient's medical history in detail and updated the computerized patient record. Assessment/Plan   Education and counseling provided:  Are appropriate based on today's review and evaluation    1. Encounter for Medicare annual wellness exam  2. Amaurosis fugax of right eye  3. Balance problem  -     REFERRAL TO PHYSICAL THERAPY  4. Chronic midline low back pain without sciatica  -     XR SPINE LUMB 2 OR 3 V; Future  5. Atrial fibrillation, unspecified type ([address]  6. HTN, goal below 140/90  7. Hypothyroidism, unspecified type  -     TSH 3RD GENERATION; Future  -     T4 (THYROXINE); Future  8. Glucose intolerance (impaired glucose tolerance)  -     HEMOGLOBIN A1C WITH EAG; Future  9. History of prostate cancer  -     PSA, DIAGNOSTIC (PROSTATE SPECIFIC AG); Future  10. Hyperlipidemia, unspecified hyperlipidemia type  -     METABOLIC PANEL, COMPREHENSIVE; Future  -     CBC W/O DIFF; Future  -     LIPID PANEL; Future  11.  CHIKA on CPAP       Depression Risk Factor Screening     3 most recent PHQ Screens 4/17/2023   Little interest or pleasure in doing things Not at all   Feeling down, depressed, irritable, or hopeless Not at all   Total Score PHQ 2 0   Trouble falling or staying asleep, or sleeping too much Not at all   Feeling tired or having little energy Not at all   Poor appetite, weight loss, or overeating Not at all   Feeling bad about yourself - or that you are a failure or have let yourself or your family down Not at all   Trouble concentrating on things such as school, work, reading, or watching TV Not at all   Moving or speaking so slowly that other people could have noticed; or the opposite being so fidgety that others notice Not at all   Thoughts of being better off dead, or hurting yourself in some way Not at all   PHQ 9 Score 0       Alcohol & Drug Abuse Risk Screen    Do you average more than 1 drink per night or more than 7 drinks a week: No    In the past three months have you have had more than 4 drinks containing alcohol on one occasion: No          Functional Ability and Level of Safety    Hearing: Hearing is good. Activities of Daily Living: The home contains: no safety equipment. Patient does total self care      Ambulation: with mild difficulty     Fall Risk:  Fall Risk Assessment, last 12 mths 4/17/2023   Able to walk? Yes   Fall in past 12 months? 0   Do you feel unsteady? 1   Are you worried about falling 0   Is TUG test greater than 12 seconds? 0   Is the gait abnormal? 0   Number of falls in past 12 months -   Fall with injury?  -      Abuse Screen:  Patient is not abused       Cognitive Screening    Has your family/caregiver stated any concerns about your memory: no       Health Maintenance Due     Health Maintenance Due   Topic Date Due    COVID-19 Vaccine (1) Never done    Depression Screen  04/14/2023       Patient Care Team   Patient Care Team:  Toni Pinon MD as PCP - General (Family Medicine)  Toni Pinon MD as PCP - [facility] Empaneled Provider  Jesus Sarmiento MD (Dermatology Physician)  Sona Ingram MD as Physician (Ophthalmology)  Miriam Meier MD (Neurology)  Chelsea Sun MD as Physician (Cardio Vascular Surgery)    History     Patient Active Problem List   Diagnosis Code    Hearing loss H91.90    History of prostate cancer Z85.46    Hypothyroidism E03.9    HTN, goal below 140/90 I10    Hematuria, microscopic R31.29    Rectus diastasis M62.08    ED (erectile dysfunction) N52.9    Tear of meniscus of right knee S83.206A    Left inguinal hernia K40.90    Seborrheic dermatitis, unspecified L21.9    Keratoacanthoma L85.8    Degenerative arthritis of left knee M17.12    Degenerative arthritis of hip M16.9    Unspecified adverse effect of anesthesia T41.45XA    Atrial fibrillation (HCC) I48.91    Aortic insufficiency I35.1    Primary localized osteoarthritis of left knee M17.12    Primary osteoarthritis of left knee M17.12    CHIKA on CPAP G47.33, Z99.89    Hyperlipidemia E78.5    Glucose intolerance (impaired glucose tolerance) R73.02     Past Medical History:   Diagnosis Date    Aortic insufficiency     Arthritis     Atrial fibrillation (HCC)     Chronic pain     Hearing loss 4/1/2010    Hematuria, microscopic 4/1/2010    Hypothyroidism 4/1/2010    On warfarin therapy     Prostate CA ([address] 1992    Rectus diastasis 4/1/2010    Seborrheic dermatitis, unspecified 4/1/2010    Unspecified adverse effect of anesthesia     DIFFICULT TO AWAKEN      Past Surgical History:   Procedure Laterality Date    ENDOSCOPY, COLON, DIAGNOSTIC  11/19/07    w/polypectomy    HX GI      COLONOSCOPY    HX GI      RECTAL FISTULA REPAIR    HX HEENT  3446,2919    BCCA SCALP,NECK ,FACE     HX HEENT Right 01/2019    CAUTERY,NOSE     HX HERNIA REPAIR  2004    hernia-    HX KNEE ARTHROSCOPY  7/08    MENISCECTOMY LEFT    HX KNEE ARTHROSCOPY      LEFT AND RIGHT ARTHROSCOPY    HX ORTHOPAEDIC  2012    BONE SPUR REMOVAL     HX RADICAL PROSTATECTOMY  1994    HX TONSILLECTOMY  1943     Current Outpatient Medications   Medication Sig Dispense Refill    levothyroxine (SYNTHROID) 150 mcg tablet TAKE 1 TABLET BEFORE BREAKFAST 90 Tablet 3    apixaban (ELIQUIS) 2.5 mg tablet Take 1 Tablet by mouth.      multivitamin (ONE A DAY) tablet Take 1 Tablet by mouth daily. OTHER Take 2 Tabs by mouth every evening. fibercon/calciun       ASCORBIC ACID (VITAMIN C PO) Take 500 mg by mouth daily (after breakfast).        Allergies   Allergen Reactions    Penicillins Rash       Family History   Problem Relation Age of Onset    Stroke Mother     Heart Disease Father         CHF    Diabetes Father     Cancer Sister         breast    Thyroid Disease Sister     Diabetes Daughter 5        insulin-dependent    Anesth Problems Neg Hx      Social History     Tobacco Use Smoking status: Never    Smokeless tobacco: Never   Substance Use Topics    Alcohol use:  Yes     Alcohol/week: 5.0 standard drinks     Types: 2 Glasses of wine, 4 Standard drinks or equivalent per week     Comment: ira Guzman MD

## 2023-04-17 NOTE — PROGRESS NOTES
Reviewed record in preparation for visit and have obtained necessary documentation. Identified pt with two pt identifiers(name and ). Chief Complaint   Patient presents with    Annual Wellness Visit       Visit Vitals  /70 (BP 1 Location: Left upper arm, BP Patient Position: Sitting, BP Cuff Size: Adult)   Pulse 64   Temp 97.4 °F (36.3 °C) (Temporal)   Resp 16   Ht 5' 9\" (1.753 m)   Wt 202 lb 6.4 oz (91.8 kg)   SpO2 97%   BMI 29.89 kg/m²        Health Maintenance Due   Topic Date Due    COVID-19 Vaccine (1) Never done    Depresssion Screening  2023    Annual Well Visit  04/15/2023       Mr. Jose Juan Cason has a reminder for a \"due or due soon\" health maintenance. I have asked that he discuss this further with his primary care provider for follow-up on this health maintenance. 1. \"Have you been to the ER, urgent care clinic since your last visit? Hospitalized since your last visit? \" No    2. \"Have you seen or consulted any other health care providers outside of the [address] [place] [address] since your last visit? \" No     3. For patients aged 39-70: Has the patient had a colonoscopy / FIT/ Cologuard? NA - based on age      If the patient is female:    4. For patients aged 41-77: Has the patient had a mammogram within the past 2 years? NA - based on age or sex      11. For patients aged 21-65: Has the patient had a pap smear?  NA - based on age or sex

## 2023-04-18 ENCOUNTER — TELEPHONE (OUTPATIENT)
Dept: FAMILY MEDICINE CLINIC | Age: 83
End: 2023-04-18

## 2023-04-18 LAB
ALBUMIN SERPL-MCNC: 3.7 G/DL (ref 3.5–5)
ALBUMIN/GLOB SERPL: 1.2 (ref 1.1–2.2)
ALP SERPL-CCNC: 62 U/L (ref 45–117)
ALT SERPL-CCNC: 22 U/L (ref 12–78)
ANION GAP SERPL CALC-SCNC: 4 MMOL/L (ref 5–15)
AST SERPL-CCNC: 20 U/L (ref 15–37)
BILIRUB SERPL-MCNC: 0.7 MG/DL (ref 0.2–1)
BUN SERPL-MCNC: 41 MG/DL (ref 6–20)
BUN/CREAT SERPL: 25 (ref 12–20)
CALCIUM SERPL-MCNC: 9 MG/DL (ref 8.5–10.1)
CHLORIDE SERPL-SCNC: 108 MMOL/L (ref 97–108)
CHOLEST SERPL-MCNC: 142 MG/DL
CO2 SERPL-SCNC: 29 MMOL/L (ref 21–32)
CREAT SERPL-MCNC: 1.61 MG/DL (ref 0.7–1.3)
ERYTHROCYTE [DISTWIDTH] IN BLOOD BY AUTOMATED COUNT: 13 % (ref 11.5–14.5)
EST. AVERAGE GLUCOSE BLD GHB EST-MCNC: 114 MG/DL
GLOBULIN SER CALC-MCNC: 3.1 G/DL (ref 2–4)
GLUCOSE SERPL-MCNC: 103 MG/DL (ref 65–100)
HBA1C MFR BLD: 5.6 % (ref 4–5.6)
HCT VFR BLD AUTO: 45.1 % (ref 36.6–50.3)
HDLC SERPL-MCNC: 50 MG/DL
HDLC SERPL: 2.8 (ref 0–5)
HGB BLD-MCNC: 14.6 G/DL (ref 12.1–17)
LDLC SERPL CALC-MCNC: 81 MG/DL (ref 0–100)
MCH RBC QN AUTO: 33.4 PG (ref 26–34)
MCHC RBC AUTO-ENTMCNC: 32.4 G/DL (ref 30–36.5)
MCV RBC AUTO: 103.2 FL (ref 80–99)
NRBC # BLD: 0 K/UL (ref 0–0.01)
NRBC BLD-RTO: 0 PER 100 WBC
PLATELET # BLD AUTO: 133 K/UL (ref 150–400)
PMV BLD AUTO: 11.5 FL (ref 8.9–12.9)
POTASSIUM SERPL-SCNC: 4.6 MMOL/L (ref 3.5–5.1)
PROT SERPL-MCNC: 6.8 G/DL (ref 6.4–8.2)
PSA SERPL-MCNC: 0 NG/ML (ref 0.01–4)
RBC # BLD AUTO: 4.37 M/UL (ref 4.1–5.7)
SODIUM SERPL-SCNC: 141 MMOL/L (ref 136–145)
T4 SERPL-MCNC: 10.1 UG/DL (ref 4.5–12.1)
TRIGL SERPL-MCNC: 55 MG/DL (ref ?–150)
TSH SERPL DL<=0.05 MIU/L-ACNC: 2.98 UIU/ML (ref 0.36–3.74)
VLDLC SERPL CALC-MCNC: 11 MG/DL
WBC # BLD AUTO: 6.4 K/UL (ref 4.1–11.1)

## 2023-04-18 NOTE — TELEPHONE ENCOUNTER
----- Message from Syl Dubois MD sent at 4/18/2023  8:59 AM EDT -----  Please call patient:    Labs are stable including thyroid, prostate maker, sugar, and cholesterol. Xray of back shows some mild arthritis in his spine.

## 2023-04-18 NOTE — PROGRESS NOTES
Please call patient:    Labs are stable including thyroid, prostate maker, sugar, and cholesterol. Xray of back shows some mild arthritis in his spine.

## 2023-04-27 ENCOUNTER — HOSPITAL ENCOUNTER (OUTPATIENT)
Dept: PHYSICAL THERAPY | Age: 83
Discharge: HOME | End: 2023-04-27
Payer: MEDICARE

## 2023-04-27 DIAGNOSIS — R26.89 BALANCE PROBLEM: Primary | ICD-10-CM

## 2023-04-27 PROCEDURE — 97161 PT EVAL LOW COMPLEX 20 MIN: CPT | Performed by: PHYSICAL THERAPIST

## 2023-04-27 PROCEDURE — 97110 THERAPEUTIC EXERCISES: CPT | Performed by: PHYSICAL THERAPIST

## 2023-04-28 ENCOUNTER — OFFICE VISIT (OUTPATIENT)
Dept: ORTHOPEDIC SURGERY | Age: 83
End: 2023-04-28
Payer: MEDICARE

## 2023-04-28 VITALS — WEIGHT: 202 LBS | BODY MASS INDEX: 29.92 KG/M2 | HEIGHT: 69 IN

## 2023-04-28 DIAGNOSIS — M54.59 MECHANICAL LOW BACK PAIN: Primary | ICD-10-CM

## 2023-04-28 PROCEDURE — 1101F PT FALLS ASSESS-DOCD LE1/YR: CPT | Performed by: PHYSICIAN ASSISTANT

## 2023-04-28 PROCEDURE — 1123F ACP DISCUSS/DSCN MKR DOCD: CPT | Performed by: PHYSICIAN ASSISTANT

## 2023-04-28 PROCEDURE — G8417 CALC BMI ABV UP PARAM F/U: HCPCS | Performed by: PHYSICIAN ASSISTANT

## 2023-04-28 PROCEDURE — G8536 NO DOC ELDER MAL SCRN: HCPCS | Performed by: PHYSICIAN ASSISTANT

## 2023-04-28 PROCEDURE — 99204 OFFICE O/P NEW MOD 45 MIN: CPT | Performed by: PHYSICIAN ASSISTANT

## 2023-04-28 PROCEDURE — G8427 DOCREV CUR MEDS BY ELIG CLIN: HCPCS | Performed by: PHYSICIAN ASSISTANT

## 2023-04-28 PROCEDURE — G8432 DEP SCR NOT DOC, RNG: HCPCS | Performed by: PHYSICIAN ASSISTANT

## 2023-04-28 RX ORDER — CYCLOBENZAPRINE HCL 5 MG
5 TABLET ORAL
Qty: 7 TABLET | Refills: 0 | Status: SHIPPED | OUTPATIENT
Start: 2023-04-28 | End: 2023-05-05

## 2023-04-28 RX ORDER — METHYLPREDNISOLONE 4 MG/1
4 TABLET ORAL
Qty: 1 DOSE PACK | Refills: 0 | Status: SHIPPED | OUTPATIENT
Start: 2023-04-28

## 2023-04-28 NOTE — PROGRESS NOTES
Paul Saxena (: 1940) is a 78 y.o. male patient, here for evaluation of the following chief complaint(s):  Back Pain       ASSESSMENT/PLAN:  Below is the assessment and plan developed based on review of pertinent history, physical exam, labs, studies, and medications. 78-year-old male comes in today for left-sided low back pain. Started couple months ago has slowly worsened with time. Rates pain as severe. Located in his left low back. Made worse with activity and from sitting, better with lying flat and resting. He continues primary care for this, his recently started in physical therapy. X-rays of lumbar spine reviewed from outside facility show moderate degenerative changes of the lumbar spine space narrowing osteophyte ration. Symptoms likely consistent with mechanical low back pain versus sciatica without radiation. Discussed results with patient. Encourage patient to continue physical therapy for strengthening conditioning. Placed patient on a steroid Dosepak. We will also give a small prescription for Flexeril to take once nightly for the next week. Risks and benefits discussed patient agrees with understanding. Plans to monitor symptoms and follow-up with our spine team with improvement in symptoms. He does have a history of A-fib he is on Eliquis, unable to take NSAIDs. 1. Mechanical low back pain      Encounter Diagnosis   Name Primary? Mechanical low back pain Yes        No follow-ups on file. SUBJECTIVE/OBJECTIVE:  Paul Saxena (: 1940) is a 78 y.o. male who presents today for the following:  Chief Complaint   Patient presents with    Back Pain       78-year-old male comes in today for left-sided low back pain. Started couple months ago has slowly worsened with time. Rates pain as severe. Located in his left low back. Made worse with activity and from sitting, better with lying flat and resting.   He continues primary care for this, his recently started in physical therapy. IMAGING:  XR Results (most recent):  Results from Appointment encounter on 04/17/23    XR SPINE LUMB 2 OR 3 V    Narrative  EXAM:  XR SPINE LUMB 2 OR 3 V    INDICATION: Chronic midline low back pain    COMPARISON: None. TECHNIQUE: 3 views lumbar spine    FINDINGS: There is no acute fracture or subluxation. Vertebral body heights are  maintained. There is mild intervertebral disc space narrowing at L4-5. There is  diffuse facet arthrosis. There is no abnormality in alignment. Impression  No acute abnormality. Mild degenerative disc change at L4-5. Diffuse  facet arthrosis. Allergies   Allergen Reactions    Penicillins Rash       Current Outpatient Medications   Medication Sig    methylPREDNISolone (MEDROL DOSEPACK) 4 mg tablet Take 1 Tablet by mouth Specific Days and Specific Times. Per dose pack instructions    cyclobenzaprine (FLEXERIL) 5 mg tablet Take 1 Tablet by mouth nightly for 7 days. levothyroxine (SYNTHROID) 150 mcg tablet TAKE 1 TABLET BEFORE BREAKFAST    apixaban (ELIQUIS) 2.5 mg tablet Take 1 Tablet by mouth.    multivitamin (ONE A DAY) tablet Take 1 Tablet by mouth daily. OTHER Take 2 Tabs by mouth every evening. fibercon/calciun     ASCORBIC ACID (VITAMIN C PO) Take 500 mg by mouth daily (after breakfast). No current facility-administered medications for this visit.        Past Medical History:   Diagnosis Date    Aortic insufficiency     Arthritis     Atrial fibrillation (HCC)     Chronic pain     Hearing loss 4/1/2010    Hematuria, microscopic 4/1/2010    Hypothyroidism 4/1/2010    On warfarin therapy     Prostate CA ([facility] [address] 1992    Rectus diastasis 4/1/2010    Seborrheic dermatitis, unspecified 4/1/2010    Unspecified adverse effect of anesthesia     DIFFICULT TO AWAKEN        Past Surgical History:   Procedure Laterality Date    ENDOSCOPY, COLON, DIAGNOSTIC  11/19/07    w/polypectomy    HX GI      COLONOSCOPY    HX GI      RECTAL FISTULA REPAIR HX HEENT  [id number]    [address] [facility]     HX HEENT Right 01/2019    CAUTERY,NOSE     HX HERNIA REPAIR  2004    hernia-    HX KNEE ARTHROSCOPY  7/08    MENISCECTOMY LEFT    HX KNEE ARTHROSCOPY      LEFT AND RIGHT ARTHROSCOPY    HX ORTHOPAEDIC  2012    BONE SPUR REMOVAL     HX RADICAL PROSTATECTOMY  1994    HX TONSILLECTOMY  1943       Family History   Problem Relation Age of Onset    Stroke Mother     Heart Disease Father         CHF    Diabetes Father     Cancer Sister         breast    Thyroid Disease Sister     Diabetes Daughter 5        insulin-dependent    Anesth Problems Neg Hx         Social History     Tobacco Use    Smoking status: Never    Smokeless tobacco: Never   Substance Use Topics    Alcohol use: Yes     Alcohol/week: 5.0 standard drinks     Types: 2 Glasses of wine, 4 Standard drinks or equivalent per week     Comment: moderate        All systems reviewed x 12 and were negative with the exception of None      No flowsheet data found. Vitals:  Ht 5' 9\" (1.753 m)   Wt 202 lb (91.6 kg)   BMI 29.83 kg/m²    Body mass index is 29.83 kg/m². Physical Exam    General: No distress    Cardiac: Extremities well perfused    Respiratory: Nonlabored breathing    Back:No tenderness along paravertebral musculature. Mild pain with flexion or extension. Mild pain with lateral bending. LLE:  Negative straight leg raise. Motor strength grossly intact L2-S1 distributions. Sensation is intact to light touch grossly in L2-S1 distributions. No long tract signs      RLE: Negative straight leg raise. Motor strength grossly intact L2-S1 distributions. Sensation is intact to light touch grossly in L2-S1 distributions. No long tract signs     Skin: Warm and well-perfused. Normal cap refill. Vascular: Palpable pedal pulses bilaterally. Danica Mata M.D. was available for immediate consultation as the supervising physician.         An electronic signature was used to authenticate this note.   -- PATO JefferyC

## 2023-05-02 ENCOUNTER — HOSPITAL ENCOUNTER (OUTPATIENT)
Dept: PHYSICAL THERAPY | Age: 83
Discharge: HOME | End: 2023-05-02
Payer: MEDICARE

## 2023-05-02 PROCEDURE — 97112 NEUROMUSCULAR REEDUCATION: CPT | Performed by: PHYSICAL THERAPIST

## 2023-05-02 PROCEDURE — 97110 THERAPEUTIC EXERCISES: CPT | Performed by: PHYSICAL THERAPIST

## 2023-05-09 ENCOUNTER — TELEPHONE (OUTPATIENT)
Age: 83
End: 2023-05-09

## 2023-05-09 ENCOUNTER — HOSPITAL ENCOUNTER (OUTPATIENT)
Facility: HOSPITAL | Age: 83
Setting detail: RECURRING SERIES
Discharge: HOME | End: 2023-05-12
Payer: MEDICARE

## 2023-05-09 PROCEDURE — 97112 NEUROMUSCULAR REEDUCATION: CPT

## 2023-05-09 PROCEDURE — 97110 THERAPEUTIC EXERCISES: CPT

## 2023-05-09 PROCEDURE — 97140 MANUAL THERAPY 1/> REGIONS: CPT

## 2023-05-09 NOTE — PROGRESS NOTES
PHYSICAL THERAPY - MEDICARE DAILY TREATMENT NOTE (updated 3/23)      Date: 2023          Patient Name:  Troy Sandoval :  1940   Medical   Diagnosis:  Balance problem [R26.89] Treatment Diagnosis:  R26.89   Abnormalities of gait and mobility    Referral Source:  Julia Jade MD Insurance:   Payor: Lyndon Counter / Plan: MEDICARE PART A AND B / Product Type: *No Product type* /                     Patient  verified yes     Visit #   Current  / Total 3    Time   In / Out 100  PM   Total Treatment Time 65   Total Timed Codes 55   1:1 Treatment Time 54      [facility] Totals Reminder:  bill using total billable   min of TIMED therapeutic procedures and modalities. 8-22 min = 1 unit; 23-37 min = 2 units; 38-52 min = 3 units; 53-67 min = 4 units; 68-82 min = 5 units            SUBJECTIVE    Pain Level (0-10 scale): 0    Any medication changes, allergies to medications, adverse drug reactions, diagnosis change, or new procedure performed?: [x] No    [] Yes (see summary sheet for update)  Medications: Verified on Patient Summary List    Subjective functional status/changes:     Patient reports that his back still hurts at night and is causing difficulty sleeping. It is feeling better during the day. OBJECTIVE      Therapeutic Procedures: Tx Min Billable or 1:1 Min (if diff from Tx Min) Procedure, Rationale, Specifics   35  [id number] Therapeutic Exercise (timed):  increase ROM, strength, coordination, balance, and proprioception to improve patient's ability to progress to PLOF and address remaining functional goals. (see flow sheet as applicable)     Details if applicable:     10  55977 Neuromuscular Re-Education (timed):  improve balance, coordination, kinesthetic sense, posture, core stability and proprioception to improve patient's ability to develop conscious control of individual muscles and awareness of position of extremities in order to progress to PLOF and address remaining functional goals.

## 2023-05-11 ENCOUNTER — HOSPITAL ENCOUNTER (OUTPATIENT)
Facility: HOSPITAL | Age: 83
Setting detail: RECURRING SERIES
Discharge: HOME | End: 2023-05-14
Payer: MEDICARE

## 2023-05-11 PROCEDURE — 97112 NEUROMUSCULAR REEDUCATION: CPT

## 2023-05-11 PROCEDURE — 97110 THERAPEUTIC EXERCISES: CPT

## 2023-05-23 ENCOUNTER — HOSPITAL ENCOUNTER (OUTPATIENT)
Facility: HOSPITAL | Age: 83
Setting detail: RECURRING SERIES
Discharge: HOME | End: 2023-05-26
Payer: MEDICARE

## 2023-05-23 PROCEDURE — 97112 NEUROMUSCULAR REEDUCATION: CPT

## 2023-05-23 PROCEDURE — 97110 THERAPEUTIC EXERCISES: CPT

## 2023-05-23 NOTE — PROGRESS NOTES
PHYSICAL THERAPY - MEDICARE DAILY TREATMENT NOTE (updated 3/23)      Date: 2023          Patient Name:  Kaela Bray :  1940   Medical   Diagnosis:  Balance problem [R26.89] Treatment Diagnosis:  R26.89   Abnormalities of gait and mobility    Referral Source:  Eder Mcnally MD Insurance:   Payor: Geo Dangelo / Plan: MEDICARE PART A AND B / Product Type: *No Product type* /                     Patient  verified yes     Visit #   Current  / Total 5    Time   In / Out 1230 PM 1:35 PM   Total Treatment Time 55   Total Timed Codes 55   1:1 Treatment Time 54      [facility] Totals Reminder:  bill using total billable   min of TIMED therapeutic procedures and modalities. 8-22 min = 1 unit; 23-37 min = 2 units; 38-52 min = 3 units; 53-67 min = 4 units; 68-82 min = 5 units            SUBJECTIVE    Pain Level (0-10 scale): 0    Any medication changes, allergies to medications, adverse drug reactions, diagnosis change, or new procedure performed?: [x] No    [] Yes (see summary sheet for update)  Medications: Verified on Patient Summary List    Subjective functional status/changes:     Patient reports continues to have back pain when first transitioning out of the bed in the morning but is able to sleep through the night w/out pain; reports that he has still been taking muscle relaxers before bed when he remembers to    OBJECTIVE      Therapeutic Procedures: Tx Min Billable or 1:1 Min (if diff from Tx Min) Procedure, Rationale, Specifics   30 30 95204 Therapeutic Exercise (timed):  increase ROM, strength, coordination, balance, and proprioception to improve patient's ability to progress to PLOF and address remaining functional goals.  (see flow sheet as applicable)     Details if applicable:     25 [phone] [id number] Neuromuscular Re-Education (timed):  improve balance, coordination, kinesthetic sense, posture, core stability and proprioception to improve patient's ability to develop conscious control of individual

## 2023-05-25 ENCOUNTER — HOSPITAL ENCOUNTER (OUTPATIENT)
Facility: HOSPITAL | Age: 83
Setting detail: RECURRING SERIES
Discharge: HOME | End: 2023-05-28
Payer: MEDICARE

## 2023-05-25 PROCEDURE — 97112 NEUROMUSCULAR REEDUCATION: CPT

## 2023-05-25 PROCEDURE — 97110 THERAPEUTIC EXERCISES: CPT

## 2023-06-01 ENCOUNTER — HOSPITAL ENCOUNTER (OUTPATIENT)
Facility: HOSPITAL | Age: 83
Setting detail: RECURRING SERIES
Discharge: HOME | End: 2023-06-04
Payer: MEDICARE

## 2023-06-01 PROCEDURE — 97110 THERAPEUTIC EXERCISES: CPT

## 2023-06-01 PROCEDURE — 97112 NEUROMUSCULAR REEDUCATION: CPT

## 2023-08-07 RX ORDER — LEVOTHYROXINE SODIUM 0.15 MG/1
TABLET ORAL
Qty: 90 TABLET | Refills: 3 | Status: SHIPPED | OUTPATIENT
Start: 2023-08-07

## 2023-08-07 NOTE — TELEPHONE ENCOUNTER
PCP: Gloria Coffman MD    Last appt: 4/17/2023       Future Appointments   Date Time Provider [address]   4/19/2024  9:00 AM Joslyn Mcfarlane MD [facility]       Requested Prescriptions     Pending Prescriptions Disp Refills    levothyroxine (SYNTHROID) 150 MCG tablet [Pharmacy Med Name: L-THYROXINE (SYNTHROID) TABS 150MCG] 90 tablet 3     Sig: TAKE 1 TABLET BEFORE BREAKFAST       Prior labs and Blood pressures:  BP Readings from Last 3 Encounters:   04/17/23 130/70   04/14/22 110/62     Lab Results   Component Value Date/Time     04/17/2023 09:51 AM    K 4.6 04/17/2023 09:51 AM     04/17/2023 09:51 AM    CO2 29 04/17/2023 09:51 AM    BUN 41 04/17/2023 09:51 AM    GFRAA 47 04/14/2022 11:04 AM     No results found for: HBA1C, [id number]  Lab Results   Component Value Date/Time    CHOL 142 04/17/2023 09:51 AM    HDL 50 04/17/2023 09:51 AM     No results found for: VITD3, VD3RIA    Lab Results   Component Value Date/Time    TSH 2.98 04/17/2023 09:51 AM

## 2023-09-11 ENCOUNTER — NURSE TRIAGE (OUTPATIENT)
Dept: OTHER | Facility: CLINIC | Age: 83
End: 2023-09-11

## 2023-09-11 NOTE — TELEPHONE ENCOUNTER
[facility]    Received call from [place] at [facility] with [employer]. Subjective: Caller states \" I tripped over the frame of the bed , I hit my glasses and nose but that's fine, my left leg had cuts and bruising and swelling\"     Current Symptoms: toes and ankles are bruised and sore- 2 cuts to shin, swolllen and warm to touch, redness- Pt states also on eliquis- states hematoma from shin to ankle    Onset: 10 days ago; gradual    Associated Symptoms:  pain walking    Pain Severity: 5/10; tender; waxing and waning    Temperature: no fevers     What has been tried: ABX cream      Recommended disposition: See in Office Today    Care advice provided, patient verbalizes understanding; denies any other questions or concerns; instructed to call back for any new or worsening symptoms. Patient/Caller agrees with recommended disposition; writer provided warm transfer to [facility] at [facility] for appointment scheduling    Attention Provider: Thank you for allowing me to participate in the care of your patient. The patient was connected to triage in response to information provided to the ECC/PSC. Please do not respond through this encounter as the response is not directed to a shared pool.         Reason for Disposition   Wound infection suspected (cut or other wound now looks infected)   Looks infected (spreading redness, red streak) and no fever    Protocols used: Skin Injury-ADULT-[place], Wound Infection Suspected-ADULT-[place]

## 2023-09-12 ENCOUNTER — OFFICE VISIT (OUTPATIENT)
Age: 83
End: 2023-09-12
Payer: MEDICARE

## 2023-09-12 VITALS
OXYGEN SATURATION: 97 % | BODY MASS INDEX: 28.19 KG/M2 | DIASTOLIC BLOOD PRESSURE: 60 MMHG | TEMPERATURE: 98.1 F | HEART RATE: 67 BPM | SYSTOLIC BLOOD PRESSURE: 106 MMHG | RESPIRATION RATE: 18 BRPM | WEIGHT: 201.4 LBS | HEIGHT: 71 IN

## 2023-09-12 DIAGNOSIS — S89.92XA INJURY OF LEFT SHIN, INITIAL ENCOUNTER: ICD-10-CM

## 2023-09-12 DIAGNOSIS — L03.116 CELLULITIS OF LEFT LOWER EXTREMITY: Primary | ICD-10-CM

## 2023-09-12 DIAGNOSIS — Z91.81 AT HIGH RISK FOR FALLS: ICD-10-CM

## 2023-09-12 PROBLEM — N18.30 CHRONIC RENAL DISEASE, STAGE III (HCC): Status: ACTIVE | Noted: 2023-09-12

## 2023-09-12 PROCEDURE — 99214 OFFICE O/P EST MOD 30 MIN: CPT | Performed by: STUDENT IN AN ORGANIZED HEALTH CARE EDUCATION/TRAINING PROGRAM

## 2023-09-12 PROCEDURE — 3074F SYST BP LT 130 MM HG: CPT | Performed by: STUDENT IN AN ORGANIZED HEALTH CARE EDUCATION/TRAINING PROGRAM

## 2023-09-12 PROCEDURE — 1123F ACP DISCUSS/DSCN MKR DOCD: CPT | Performed by: STUDENT IN AN ORGANIZED HEALTH CARE EDUCATION/TRAINING PROGRAM

## 2023-09-12 PROCEDURE — 3078F DIAST BP <80 MM HG: CPT | Performed by: STUDENT IN AN ORGANIZED HEALTH CARE EDUCATION/TRAINING PROGRAM

## 2023-09-12 PROCEDURE — 1036F TOBACCO NON-USER: CPT | Performed by: STUDENT IN AN ORGANIZED HEALTH CARE EDUCATION/TRAINING PROGRAM

## 2023-09-12 PROCEDURE — G8419 CALC BMI OUT NRM PARAM NOF/U: HCPCS | Performed by: STUDENT IN AN ORGANIZED HEALTH CARE EDUCATION/TRAINING PROGRAM

## 2023-09-12 PROCEDURE — G8427 DOCREV CUR MEDS BY ELIG CLIN: HCPCS | Performed by: STUDENT IN AN ORGANIZED HEALTH CARE EDUCATION/TRAINING PROGRAM

## 2023-09-12 RX ORDER — ATORVASTATIN CALCIUM 10 MG/1
TABLET, FILM COATED ORAL
COMMUNITY
Start: 2023-08-16

## 2023-09-12 RX ORDER — CEPHALEXIN 500 MG/1
500 CAPSULE ORAL 4 TIMES DAILY
Qty: 28 CAPSULE | Refills: 0 | Status: SHIPPED | OUTPATIENT
Start: 2023-09-12 | End: 2023-09-19

## 2023-09-12 RX ORDER — CALCIUM POLYCARBOPHIL 625 MG
TABLET ORAL
COMMUNITY

## 2023-09-12 RX ORDER — ASCORBIC ACID 250 MG
500 TABLET ORAL
COMMUNITY

## 2023-09-12 SDOH — ECONOMIC STABILITY: FOOD INSECURITY: WITHIN THE PAST 12 MONTHS, THE FOOD YOU BOUGHT JUST DIDN'T LAST AND YOU DIDN'T HAVE MONEY TO GET MORE.: NEVER TRUE

## 2023-09-12 SDOH — ECONOMIC STABILITY: INCOME INSECURITY: HOW HARD IS IT FOR YOU TO PAY FOR THE VERY BASICS LIKE FOOD, HOUSING, MEDICAL CARE, AND HEATING?: NOT HARD AT ALL

## 2023-09-12 SDOH — ECONOMIC STABILITY: FOOD INSECURITY: WITHIN THE PAST 12 MONTHS, YOU WORRIED THAT YOUR FOOD WOULD RUN OUT BEFORE YOU GOT MONEY TO BUY MORE.: NEVER TRUE

## 2023-09-12 SDOH — ECONOMIC STABILITY: HOUSING INSECURITY
IN THE LAST 12 MONTHS, WAS THERE A TIME WHEN YOU DID NOT HAVE A STEADY PLACE TO SLEEP OR SLEPT IN A SHELTER (INCLUDING NOW)?: NO

## 2023-09-12 ASSESSMENT — PATIENT HEALTH QUESTIONNAIRE - PHQ9
SUM OF ALL RESPONSES TO PHQ QUESTIONS 1-9: 0
2. FEELING DOWN, DEPRESSED OR HOPELESS: 0
SUM OF ALL RESPONSES TO PHQ QUESTIONS 1-9: 0
SUM OF ALL RESPONSES TO PHQ QUESTIONS 1-9: 0
1. LITTLE INTEREST OR PLEASURE IN DOING THINGS: 0
SUM OF ALL RESPONSES TO PHQ QUESTIONS 1-9: 0
SUM OF ALL RESPONSES TO PHQ9 QUESTIONS 1 & 2: 0

## 2023-10-17 ENCOUNTER — TELEMEDICINE (OUTPATIENT)
Age: 83
End: 2023-10-17
Payer: MEDICARE

## 2023-10-17 DIAGNOSIS — U07.1 COVID-19: Primary | ICD-10-CM

## 2023-10-17 PROCEDURE — G8427 DOCREV CUR MEDS BY ELIG CLIN: HCPCS | Performed by: STUDENT IN AN ORGANIZED HEALTH CARE EDUCATION/TRAINING PROGRAM

## 2023-10-17 PROCEDURE — 1123F ACP DISCUSS/DSCN MKR DOCD: CPT | Performed by: STUDENT IN AN ORGANIZED HEALTH CARE EDUCATION/TRAINING PROGRAM

## 2023-10-17 PROCEDURE — G8484 FLU IMMUNIZE NO ADMIN: HCPCS | Performed by: STUDENT IN AN ORGANIZED HEALTH CARE EDUCATION/TRAINING PROGRAM

## 2023-10-17 PROCEDURE — 99213 OFFICE O/P EST LOW 20 MIN: CPT | Performed by: STUDENT IN AN ORGANIZED HEALTH CARE EDUCATION/TRAINING PROGRAM

## 2023-10-17 PROCEDURE — G8419 CALC BMI OUT NRM PARAM NOF/U: HCPCS | Performed by: STUDENT IN AN ORGANIZED HEALTH CARE EDUCATION/TRAINING PROGRAM

## 2023-10-17 PROCEDURE — 1036F TOBACCO NON-USER: CPT | Performed by: STUDENT IN AN ORGANIZED HEALTH CARE EDUCATION/TRAINING PROGRAM

## 2023-10-17 NOTE — PROGRESS NOTES
Stability Vital Sign     Unable to Pay for Housing in the Last Year: Not on file     Number of Places Lived in the Last Year: Not on file     Unstable Housing in the Last Year: No     Patient Active Problem List   Diagnosis    History of prostate cancer    Rectus diastasis    Seborrheic dermatitis, unspecified    Atrial fibrillation (HCC)    Aortic insufficiency    Hematuria, microscopic    Hearing loss    HTN, goal below 140/90    Degenerative arthritis of left knee    Hypothyroidism    Degenerative arthritis of hip    ED (erectile dysfunction)    Keratoacanthoma    Primary osteoarthritis of left knee    Left inguinal hernia    Tear of meniscus of right knee    Primary localized osteoarthritis of left knee    PABLO on CPAP    Hyperlipidemia    Glucose intolerance (impaired glucose tolerance)    Chronic renal disease, stage III ([address]) [387867]    At high risk for falls          Current Medications/Allergies   Medications and Allergies reviewed:    Current Outpatient Medications   Medication Sig Dispense Refill    atorvastatin (LIPITOR) 10 MG tablet       ascorbic acid (V-R VITAMIN C) 250 MG tablet Take 2 tablets by mouth      Multiple Vitamin (MULTIVITAMIN PO) Take 1 tablet by mouth daily      Calcium Polycarbophil (FIBER) 625 MG TABS Take by mouth      levothyroxine (SYNTHROID) 150 MCG tablet TAKE 1 TABLET BEFORE BREAKFAST 90 tablet 3    apixaban (ELIQUIS) 2.5 MG TABS tablet Take 1 tablet by mouth       No current facility-administered medications for this visit.      Allergies   Allergen Reactions    Penicillins Rash

## 2024-04-18 ENCOUNTER — OFFICE VISIT (OUTPATIENT)
Age: 84
End: 2024-04-18
Payer: MEDICARE

## 2024-04-18 VITALS
OXYGEN SATURATION: 98 % | TEMPERATURE: 97.7 F | BODY MASS INDEX: 28.86 KG/M2 | HEIGHT: 70 IN | WEIGHT: 201.6 LBS | RESPIRATION RATE: 14 BRPM | SYSTOLIC BLOOD PRESSURE: 116 MMHG | DIASTOLIC BLOOD PRESSURE: 66 MMHG | HEART RATE: 57 BPM

## 2024-04-18 DIAGNOSIS — E03.9 HYPOTHYROIDISM, UNSPECIFIED TYPE: ICD-10-CM

## 2024-04-18 DIAGNOSIS — I48.91 ATRIAL FIBRILLATION, UNSPECIFIED TYPE (HCC): ICD-10-CM

## 2024-04-18 DIAGNOSIS — E78.5 HYPERLIPIDEMIA, UNSPECIFIED HYPERLIPIDEMIA TYPE: ICD-10-CM

## 2024-04-18 DIAGNOSIS — I10 ESSENTIAL (PRIMARY) HYPERTENSION: ICD-10-CM

## 2024-04-18 DIAGNOSIS — Z00.00 ENCOUNTER FOR MEDICARE ANNUAL WELLNESS EXAM: Primary | ICD-10-CM

## 2024-04-18 DIAGNOSIS — R73.02 IMPAIRED GLUCOSE TOLERANCE (ORAL): ICD-10-CM

## 2024-04-18 DIAGNOSIS — Z85.46 PERSONAL HISTORY OF MALIGNANT NEOPLASM OF PROSTATE: ICD-10-CM

## 2024-04-18 DIAGNOSIS — N18.31 STAGE 3A CHRONIC KIDNEY DISEASE (HCC): ICD-10-CM

## 2024-04-18 PROBLEM — Z95.818 PRESENCE OF WATCHMAN LEFT ATRIAL APPENDAGE CLOSURE DEVICE: Status: ACTIVE | Noted: 2024-04-18

## 2024-04-18 LAB
T4 FREE SERPL-MCNC: 1.6 NG/DL (ref 0.8–1.5)
TSH SERPL DL<=0.05 MIU/L-ACNC: 2.95 UIU/ML (ref 0.36–3.74)

## 2024-04-18 PROCEDURE — 1036F TOBACCO NON-USER: CPT | Performed by: FAMILY MEDICINE

## 2024-04-18 PROCEDURE — 3078F DIAST BP <80 MM HG: CPT | Performed by: FAMILY MEDICINE

## 2024-04-18 PROCEDURE — 3074F SYST BP LT 130 MM HG: CPT | Performed by: FAMILY MEDICINE

## 2024-04-18 PROCEDURE — 99213 OFFICE O/P EST LOW 20 MIN: CPT | Performed by: FAMILY MEDICINE

## 2024-04-18 PROCEDURE — G8427 DOCREV CUR MEDS BY ELIG CLIN: HCPCS | Performed by: FAMILY MEDICINE

## 2024-04-18 PROCEDURE — G0439 PPPS, SUBSEQ VISIT: HCPCS | Performed by: FAMILY MEDICINE

## 2024-04-18 PROCEDURE — 1123F ACP DISCUSS/DSCN MKR DOCD: CPT | Performed by: FAMILY MEDICINE

## 2024-04-18 PROCEDURE — G8419 CALC BMI OUT NRM PARAM NOF/U: HCPCS | Performed by: FAMILY MEDICINE

## 2024-04-18 SDOH — ECONOMIC STABILITY: FOOD INSECURITY: WITHIN THE PAST 12 MONTHS, YOU WORRIED THAT YOUR FOOD WOULD RUN OUT BEFORE YOU GOT MONEY TO BUY MORE.: NEVER TRUE

## 2024-04-18 SDOH — ECONOMIC STABILITY: INCOME INSECURITY: HOW HARD IS IT FOR YOU TO PAY FOR THE VERY BASICS LIKE FOOD, HOUSING, MEDICAL CARE, AND HEATING?: NOT HARD AT ALL

## 2024-04-18 SDOH — ECONOMIC STABILITY: FOOD INSECURITY: WITHIN THE PAST 12 MONTHS, THE FOOD YOU BOUGHT JUST DIDN'T LAST AND YOU DIDN'T HAVE MONEY TO GET MORE.: NEVER TRUE

## 2024-04-18 ASSESSMENT — PATIENT HEALTH QUESTIONNAIRE - PHQ9
2. FEELING DOWN, DEPRESSED OR HOPELESS: NOT AT ALL
SUM OF ALL RESPONSES TO PHQ9 QUESTIONS 1 & 2: 0
SUM OF ALL RESPONSES TO PHQ QUESTIONS 1-9: 0
1. LITTLE INTEREST OR PLEASURE IN DOING THINGS: NOT AT ALL
SUM OF ALL RESPONSES TO PHQ QUESTIONS 1-9: 0

## 2024-04-18 ASSESSMENT — LIFESTYLE VARIABLES
HOW MANY STANDARD DRINKS CONTAINING ALCOHOL DO YOU HAVE ON A TYPICAL DAY: 1 OR 2
HOW OFTEN DO YOU HAVE A DRINK CONTAINING ALCOHOL: 2-4 TIMES A MONTH

## 2024-04-18 ASSESSMENT — ENCOUNTER SYMPTOMS
CONSTIPATION: 0
NAUSEA: 0
CHEST TIGHTNESS: 0
SHORTNESS OF BREATH: 0
DIARRHEA: 0
COUGH: 0
WHEEZING: 0
ABDOMINAL PAIN: 0
VOMITING: 0

## 2024-04-18 NOTE — PROGRESS NOTES
Chief Complaint   Patient presents with    Medicare AWV     \"Have you been to the ER, urgent care clinic since your last visit?  Hospitalized since your last visit?\"    NO    “Have you seen or consulted any other health care providers outside of [facility] since your last visit?”    Yes. Sleep doctor-          Financial Resource Strain: Low Risk  (4/18/2024)    Overall Financial Resource Strain (CARDIA)     Difficulty of Paying Living Expenses: Not hard at all      Food Insecurity: No Food Insecurity (4/18/2024)    Hunger Vital Sign     Worried About Running Out of Food in the Last Year: Never true     Ran Out of Food in the Last Year: Never true            4/18/2024     9:20 AM   PHQ-9    Little interest or pleasure in doing things 0   Feeling down, depressed, or hopeless 0   PHQ-2 Score 0   PHQ-9 Total Score 0       Health Maintenance Due   Topic Date Due    COVID-19 Vaccine (1) Never done    Respiratory Syncytial Virus (RSV) Pregnant or age 60 yrs+ (1 - 1-dose 60+ series) Never done    Annual Wellness Visit (Medicare)  04/17/2024    Lipids  04/17/2024    Prostate Specific Antigen (PSA) Screening or Monitoring  04/17/2024

## 2024-04-18 NOTE — PROGRESS NOTES
Medicare Annual Wellness Visit    Jose Nelson is here for Medicare AWV    Assessment & Plan   Encounter for Medicare annual wellness exam  Atrial fibrillation, unspecified type (HCC)  Stage 3a chronic kidney disease (HCC)  Essential (primary) hypertension  Impaired glucose tolerance (oral)  -     Hemoglobin A1C; Future  Hypothyroidism, unspecified type  -     TSH + Free T4 Panel; Future  Personal history of malignant neoplasm of prostate  -     PSA Screening; Future  Hyperlipidemia, unspecified hyperlipidemia type  -     CBC; Future  -     Comprehensive Metabolic Panel; Future  -     Lipid Panel; Future     Recommendations for Preventive Services Due: see orders and patient instructions/AVS.  Recommended screening schedule for the next 5-10 years is provided to the patient in written form: see Patient Instructions/AVS.     Return in about 1 year (around 4/18/2025) for AWV.     Subjective     Patient's complete Health Risk Assessment and screening values have been reviewed and are found in Flowsheets. The following problems were reviewed today and where indicated follow up appointments were made and/or referrals ordered.    Positive Risk Factor Screenings with Interventions:    Fall Risk:  Do you feel unsteady or are you worried about falling? : (!) yes  2 or more falls in past year?: (!) yes  Fall with injury in past year?: no     Interventions:    Reviewed medications, home hazards, visual acuity, and co-morbidities that can increase risk for falls  Patient declines any further evaluation or treatment    Cognitive:      Words recalled: 2 Words Recalled     Total Score Interpretation: Abnormal Mini-Cog  Interventions:  Patient declines any further evaluation or treatment                 ADL's:   Patient reports needing help with:  Select all that apply: (!) Housekeeping, Food Preparation  Interventions:  Patient declined any further interventions or treatment              Objective   Vitals:    04/18/24 0932

## 2024-04-18 NOTE — PROGRESS NOTES
Patient Name: Jose Nelson   MRN: [id number]    ASSESSMENT AND PLAN  Jose Nelson is a 83 y.o. male who presents today for:    1. Encounter for Medicare annual wellness exam    2. Atrial fibrillation, unspecified type (HCC)  Stable, continue follow up with cardiology.    3. Stage 3a chronic kidney disease (HCC)  Stable, continue current treatment, pending review of labs.    4. Essential (primary) hypertension  Stable, continue current treatment.    5. Impaired glucose tolerance (oral)   Hemoglobin A1C; Future  - Hemoglobin A1C    6. Hypothyroidism, unspecified type  - TSH + Free T4 Panel; Future  - TSH + Free T4 Panel    7. Personal history of malignant neoplasm of prostate  - PSA Screening; Future  - PSA Screening    8. Hyperlipidemia, unspecified hyperlipidemia type  - CBC; Future  - Comprehensive Metabolic Panel; Future  - Lipid Panel; Future  - Lipid Panel  - Comprehensive Metabolic Panel  - CBC    No orders of the defined types were placed in this encounter.       There are no discontinued medications.    Return in about 1 year (around 4/18/2025) for AWV.      SUBJECTIVE  Jose Nelson is a 83 y.o. male who presents with the following:     Recently underwent Watchman procedure for his atrial fibrillation this January.  Still is in atrial fibrillation but overall asymptomatic.  Has follow-up with cardiology in a few weeks to see if he could have his apixaban discontinued.  He now lives in an assisted living facility which is an adjustment but overall has been a good move.  Has had several falls prior to moving to assisted living facility due to balance issues.  Previously received PT for this but wants to hold off on that for now.    Wt Readings from Last 3 Encounters:   04/18/24 91.4 kg (201 lb 9.6 oz)   03/18/24 90.7 kg (200 lb)   09/12/23 91.4 kg (201 lb 6.4 oz)     Review of Systems   Constitutional:  Negative for activity change, appetite change, fatigue, fever and unexpected weight change.

## 2024-04-19 DIAGNOSIS — N18.31 STAGE 3A CHRONIC KIDNEY DISEASE (HCC): Primary | ICD-10-CM

## 2024-04-19 LAB
ALBUMIN SERPL-MCNC: 3.4 G/DL (ref 3.5–5)
ALBUMIN/GLOB SERPL: 1.2 (ref 1.1–2.2)
ALP SERPL-CCNC: 85 U/L (ref 45–117)
ALT SERPL-CCNC: 41 U/L (ref 12–78)
ANION GAP SERPL CALC-SCNC: 2 MMOL/L (ref 5–15)
AST SERPL-CCNC: 25 U/L (ref 15–37)
BILIRUB SERPL-MCNC: 0.7 MG/DL (ref 0.2–1)
BUN SERPL-MCNC: 33 MG/DL (ref 6–20)
BUN/CREAT SERPL: 17 (ref 12–20)
CALCIUM SERPL-MCNC: 9.5 MG/DL (ref 8.5–10.1)
CHLORIDE SERPL-SCNC: 112 MMOL/L (ref 97–108)
CHOLEST SERPL-MCNC: 97 MG/DL
CO2 SERPL-SCNC: 29 MMOL/L (ref 21–32)
CREAT SERPL-MCNC: 1.89 MG/DL (ref 0.7–1.3)
ERYTHROCYTE [DISTWIDTH] IN BLOOD BY AUTOMATED COUNT: 14.5 % (ref 11.5–14.5)
EST. AVERAGE GLUCOSE BLD GHB EST-MCNC: 120 MG/DL
GLOBULIN SER CALC-MCNC: 2.8 G/DL (ref 2–4)
GLUCOSE SERPL-MCNC: 99 MG/DL (ref 65–100)
HBA1C MFR BLD: 5.8 % (ref 4–5.6)
HCT VFR BLD AUTO: 40.8 % (ref 36.6–50.3)
HDLC SERPL-MCNC: 51 MG/DL
HDLC SERPL: 1.9 (ref 0–5)
HGB BLD-MCNC: 12.6 G/DL (ref 12.1–17)
LDLC SERPL CALC-MCNC: 40.4 MG/DL (ref 0–100)
MCH RBC QN AUTO: 31.9 PG (ref 26–34)
MCHC RBC AUTO-ENTMCNC: 30.9 G/DL (ref 30–36.5)
MCV RBC AUTO: 103.3 FL (ref 80–99)
NRBC # BLD: 0 K/UL (ref 0–0.01)
NRBC BLD-RTO: 0 PER 100 WBC
PLATELET # BLD AUTO: 123 K/UL (ref 150–400)
PMV BLD AUTO: 11.8 FL (ref 8.9–12.9)
POTASSIUM SERPL-SCNC: 4.8 MMOL/L (ref 3.5–5.1)
PROT SERPL-MCNC: 6.2 G/DL (ref 6.4–8.2)
PSA SERPL-MCNC: 0 NG/ML (ref 0.01–4)
RBC # BLD AUTO: 3.95 M/UL (ref 4.1–5.7)
SODIUM SERPL-SCNC: 143 MMOL/L (ref 136–145)
TRIGL SERPL-MCNC: 28 MG/DL
VLDLC SERPL CALC-MCNC: 5.6 MG/DL
WBC # BLD AUTO: 6.4 K/UL (ref 4.1–11.1)

## 2024-04-23 ENCOUNTER — HOSPITAL ENCOUNTER (OUTPATIENT)
Facility: HOSPITAL | Age: 84
Discharge: HOME | End: 2024-04-26
Payer: MEDICARE

## 2024-04-23 DIAGNOSIS — N18.31 STAGE 3A CHRONIC KIDNEY DISEASE (HCC): ICD-10-CM

## 2024-04-23 PROCEDURE — 76770 US EXAM ABDO BACK WALL COMP: CPT

## 2024-04-24 ENCOUNTER — TELEPHONE (OUTPATIENT)
Age: 84
End: 2024-04-24

## 2024-04-24 DIAGNOSIS — N18.32 STAGE 3B CHRONIC KIDNEY DISEASE (HCC): Primary | ICD-10-CM

## 2024-04-24 NOTE — TELEPHONE ENCOUNTER
Called patient. Two patient identifiers verified. Discussed lab results per provider's note. Verbalized understanding.

## 2024-07-31 RX ORDER — LEVOTHYROXINE SODIUM 0.15 MG/1
TABLET ORAL
Qty: 90 TABLET | Refills: 3 | Status: SHIPPED | OUTPATIENT
Start: 2024-07-31

## 2024-07-31 NOTE — TELEPHONE ENCOUNTER
PCP: Tiffani Lance MD    Last appt: 4/18/2024       Future Appointments   Date Time Provider Department Center   4/21/2025 10:00 AM Tiffani Lance MD [facility]       Requested Prescriptions     Pending Prescriptions Disp Refills    levothyroxine (SYNTHROID) 150 MCG tablet [Pharmacy Med Name: L-THYROXINE (SYNTHROID) TABS 150MCG] 90 tablet 3     Sig: TAKE 1 TABLET BEFORE BREAKFAST       Prior labs and Blood pressures:  BP Readings from Last 3 Encounters:   04/18/24 116/66   09/12/23 106/60   04/17/23 130/70     Lab Results   Component Value Date/Time     04/18/2024 10:07 AM    K 4.8 04/18/2024 10:07 AM     04/18/2024 10:07 AM    CO2 29 04/18/2024 10:07 AM    BUN 33 04/18/2024 10:07 AM    GFRAA 47 04/14/2022 11:04 AM     No results found for: \"HBA1C\", \"[id number]\"  Lab Results   Component Value Date/Time    CHOL 97 04/18/2024 10:07 AM    HDL 51 04/18/2024 10:07 AM    LDL 40.4 04/18/2024 10:07 AM    VLDL 5.6 04/18/2024 10:07 AM     No results found for: \"VITD3\"    Lab Results   Component Value Date/Time    TSH 2.95 04/18/2024 10:07 AM

## 2024-09-23 ENCOUNTER — TELEPHONE (OUTPATIENT)
Age: 84
End: 2024-09-23

## 2024-10-10 ENCOUNTER — OFFICE VISIT (OUTPATIENT)
Age: 84
End: 2024-10-10
Payer: MEDICARE

## 2024-10-10 VITALS
SYSTOLIC BLOOD PRESSURE: 130 MMHG | HEIGHT: 70 IN | DIASTOLIC BLOOD PRESSURE: 64 MMHG | OXYGEN SATURATION: 97 % | HEART RATE: 63 BPM | RESPIRATION RATE: 16 BRPM | WEIGHT: 204.6 LBS | BODY MASS INDEX: 29.29 KG/M2 | TEMPERATURE: 98.2 F

## 2024-10-10 DIAGNOSIS — E03.9 HYPOTHYROIDISM, UNSPECIFIED TYPE: ICD-10-CM

## 2024-10-10 DIAGNOSIS — R73.03 PREDIABETES: ICD-10-CM

## 2024-10-10 DIAGNOSIS — G47.33 OSA ON CPAP: ICD-10-CM

## 2024-10-10 DIAGNOSIS — N18.31 STAGE 3A CHRONIC KIDNEY DISEASE (HCC): ICD-10-CM

## 2024-10-10 DIAGNOSIS — I10 ESSENTIAL (PRIMARY) HYPERTENSION: Primary | ICD-10-CM

## 2024-10-10 PROCEDURE — 99214 OFFICE O/P EST MOD 30 MIN: CPT | Performed by: FAMILY MEDICINE

## 2024-10-10 RX ORDER — LOSARTAN POTASSIUM 100 MG/1
TABLET ORAL
COMMUNITY
Start: 2024-09-21

## 2024-10-10 RX ORDER — ASPIRIN 81 MG/1
81 TABLET ORAL
COMMUNITY
Start: 2024-02-23

## 2024-10-10 ASSESSMENT — ENCOUNTER SYMPTOMS
WHEEZING: 0
CONSTIPATION: 0
VOMITING: 0
CHEST TIGHTNESS: 0
COUGH: 0
NAUSEA: 0
ABDOMINAL PAIN: 0
DIARRHEA: 0
SHORTNESS OF BREATH: 0

## 2024-10-10 NOTE — PROGRESS NOTES
Patient Name: Jose Nelson   MRN: [id number]    ASSESSMENT AND PLAN  Jose Nelson is a 84 y.o. male who presents today for:    1. Essential (primary) hypertension  Recommend pt to take his metoprolol in the AM and losartan in the PM to see if this would give him more normal BP readings throughout the day.  - CBC; Future  - CBC    2. Stage 3a chronic kidney disease (HCC)  Stable, continue follow up with nephrology.  - Basic Metabolic Panel; Future  - Basic Metabolic Panel    3. Hypothyroidism, unspecified type  Stable, continue current treatment, pending review of labs.  - TSH + Free T4 Panel; Future  - TSH + Free T4 Panel    4. PABLO on CPAP  Stable, continue follow up with sleep medicine.    5. Prediabetes  - Hemoglobin A1C; Future  - Hemoglobin A1C      No orders of the defined types were placed in this encounter.       Medications Discontinued During This Encounter   Medication Reason    lisinopril (PRINIVIL;ZESTRIL) 10 MG tablet ERROR    apixaban (ELIQUIS) 2.5 MG TABS tablet LIST CLEANUP       Return in about 6 months (around 4/21/2025) for AWV.      Total time spent on this encounter: 31 minutes.    SUBJECTIVE  Jose Nelson is a 84 y.o. male who presents with the following:     Currently on metoprolol 25 mg and losartan 100 mg, managed by cardiology.  Takes both medications in the morning.  Usually checks his blood pressure an hour after taking medications but he is having some elevated systolic readings up to the 150s.  No longer on apixaban as he underwent Watchman procedure in January.  Has since established with nephrology due to CKD.  Has follow-up with Dr. Bello next month.  History of PABLO, on CPAP but has difficulty with his mask fitting well.  Was told by cardiology that he has some narrowing of his aortic valve.  They are doing echocardiograms every 6 months.    BP Readings from Last 3 Encounters:   10/10/24 130/64   04/18/24 116/66   09/12/23 106/60       Wt Readings from Last 3 Encounters:

## 2024-10-10 NOTE — PROGRESS NOTES
Chief Complaint   Patient presents with    Follow-up     \"Have you been to the ER, urgent care clinic since your last visit?  Hospitalized since your last visit?\"    NO    “Have you seen or consulted any other health care providers outside of [facility] since your last visit?”    NO      Financial Resource Strain: Low Risk  (4/18/2024)    Overall Financial Resource Strain (CARDIA)     Difficulty of Paying Living Expenses: Not hard at all      Food Insecurity: No Food Insecurity (4/18/2024)    Hunger Vital Sign     Worried About Running Out of Food in the Last Year: Never true     Ran Out of Food in the Last Year: Never true            4/18/2024     9:20 AM   PHQ-9    Little interest or pleasure in doing things 0   Feeling down, depressed, or hopeless 0   PHQ-2 Score 0   PHQ-9 Total Score 0       Health Maintenance Due   Topic Date Due    Flu vaccine (1) 08/01/2024    COVID-19 Vaccine (1 - 2023-24 season) Never done

## 2024-10-11 LAB
ANION GAP SERPL CALC-SCNC: 3 MMOL/L (ref 2–12)
BUN SERPL-MCNC: 33 MG/DL (ref 6–20)
BUN/CREAT SERPL: 20 (ref 12–20)
CALCIUM SERPL-MCNC: 9.6 MG/DL (ref 8.5–10.1)
CHLORIDE SERPL-SCNC: 108 MMOL/L (ref 97–108)
CO2 SERPL-SCNC: 28 MMOL/L (ref 21–32)
CREAT SERPL-MCNC: 1.66 MG/DL (ref 0.7–1.3)
ERYTHROCYTE [DISTWIDTH] IN BLOOD BY AUTOMATED COUNT: 14.1 % (ref 11.5–14.5)
EST. AVERAGE GLUCOSE BLD GHB EST-MCNC: 117 MG/DL
GLUCOSE SERPL-MCNC: 95 MG/DL (ref 65–100)
HBA1C MFR BLD: 5.7 % (ref 4–5.6)
HCT VFR BLD AUTO: 39 % (ref 36.6–50.3)
HGB BLD-MCNC: 12.4 G/DL (ref 12.1–17)
MCH RBC QN AUTO: 32.4 PG (ref 26–34)
MCHC RBC AUTO-ENTMCNC: 31.8 G/DL (ref 30–36.5)
MCV RBC AUTO: 101.8 FL (ref 80–99)
NRBC # BLD: 0 K/UL (ref 0–0.01)
NRBC BLD-RTO: 0 PER 100 WBC
PLATELET # BLD AUTO: 111 K/UL (ref 150–400)
PMV BLD AUTO: 11.9 FL (ref 8.9–12.9)
POTASSIUM SERPL-SCNC: 4.8 MMOL/L (ref 3.5–5.1)
RBC # BLD AUTO: 3.83 M/UL (ref 4.1–5.7)
SODIUM SERPL-SCNC: 139 MMOL/L (ref 136–145)
T4 FREE SERPL-MCNC: 1.1 NG/DL (ref 0.8–1.5)
TSH SERPL DL<=0.05 MIU/L-ACNC: 5.25 UIU/ML (ref 0.36–3.74)
WBC # BLD AUTO: 7.1 K/UL (ref 4.1–11.1)

## 2024-10-14 ENCOUNTER — TELEPHONE (OUTPATIENT)
Age: 84
End: 2024-10-14

## 2024-10-14 NOTE — TELEPHONE ENCOUNTER
Pt call wanting to get pt LAB result back pt stated that he missed the call last week Friday. Pt would like a call back with LAB results.      BCBN [phone]

## 2025-04-21 ENCOUNTER — OFFICE VISIT (OUTPATIENT)
Age: 85
End: 2025-04-21
Payer: MEDICARE

## 2025-04-21 VITALS
BODY MASS INDEX: 28.12 KG/M2 | TEMPERATURE: 97.1 F | OXYGEN SATURATION: 98 % | RESPIRATION RATE: 20 BRPM | HEIGHT: 70 IN | HEART RATE: 63 BPM | WEIGHT: 196.4 LBS | DIASTOLIC BLOOD PRESSURE: 68 MMHG | SYSTOLIC BLOOD PRESSURE: 134 MMHG

## 2025-04-21 DIAGNOSIS — Z91.81 AT RISK FOR FALLING: ICD-10-CM

## 2025-04-21 DIAGNOSIS — E78.5 HYPERLIPIDEMIA, UNSPECIFIED HYPERLIPIDEMIA TYPE: ICD-10-CM

## 2025-04-21 DIAGNOSIS — I48.91 ATRIAL FIBRILLATION, UNSPECIFIED TYPE (HCC): ICD-10-CM

## 2025-04-21 DIAGNOSIS — Z13.6 SCREENING FOR CARDIOVASCULAR CONDITION: ICD-10-CM

## 2025-04-21 DIAGNOSIS — I10 ESSENTIAL (PRIMARY) HYPERTENSION: ICD-10-CM

## 2025-04-21 DIAGNOSIS — N18.31 STAGE 3A CHRONIC KIDNEY DISEASE (HCC): ICD-10-CM

## 2025-04-21 DIAGNOSIS — R73.03 PREDIABETES: ICD-10-CM

## 2025-04-21 DIAGNOSIS — Z00.00 ENCOUNTER FOR MEDICARE ANNUAL WELLNESS EXAM: Primary | ICD-10-CM

## 2025-04-21 DIAGNOSIS — R26.81 GAIT INSTABILITY: ICD-10-CM

## 2025-04-21 DIAGNOSIS — E03.9 HYPOTHYROIDISM, UNSPECIFIED TYPE: ICD-10-CM

## 2025-04-21 DIAGNOSIS — Z13.89 ENCOUNTER FOR SCREENING FOR OTHER DISORDER: ICD-10-CM

## 2025-04-21 DIAGNOSIS — R29.3 POSTURE ABNORMALITY: ICD-10-CM

## 2025-04-21 DIAGNOSIS — R53.83 OTHER FATIGUE: ICD-10-CM

## 2025-04-21 LAB
BASOPHILS # BLD: 0.03 K/UL (ref 0–0.1)
BASOPHILS NFR BLD: 0.4 % (ref 0–1)
CHOLEST SERPL-MCNC: 88 MG/DL
DIFFERENTIAL METHOD BLD: ABNORMAL
EOSINOPHIL # BLD: 0.17 K/UL (ref 0–0.4)
EOSINOPHIL NFR BLD: 2.1 % (ref 0–7)
ERYTHROCYTE [DISTWIDTH] IN BLOOD BY AUTOMATED COUNT: 14.3 % (ref 11.5–14.5)
EST. AVERAGE GLUCOSE BLD GHB EST-MCNC: 123 MG/DL
FOLATE SERPL-MCNC: 40.5 NG/ML (ref 5–21)
HBA1C MFR BLD: 5.9 % (ref 4–5.6)
HCT VFR BLD AUTO: 40 % (ref 36.6–50.3)
HDLC SERPL-MCNC: 49 MG/DL
HDLC SERPL: 1.8 (ref 0–5)
HGB BLD-MCNC: 13.3 G/DL (ref 12.1–17)
IMM GRANULOCYTES # BLD AUTO: 0.02 K/UL (ref 0–0.04)
IMM GRANULOCYTES NFR BLD AUTO: 0.2 % (ref 0–0.5)
LDLC SERPL CALC-MCNC: 32.6 MG/DL (ref 0–100)
LYMPHOCYTES # BLD: 2.06 K/UL (ref 0.8–3.5)
LYMPHOCYTES NFR BLD: 25.2 % (ref 12–49)
MCH RBC QN AUTO: 33.2 PG (ref 26–34)
MCHC RBC AUTO-ENTMCNC: 33.3 G/DL (ref 30–36.5)
MCV RBC AUTO: 99.8 FL (ref 80–99)
MONOCYTES # BLD: 1.11 K/UL (ref 0–1)
MONOCYTES NFR BLD: 13.6 % (ref 5–13)
NEUTS SEG # BLD: 4.78 K/UL (ref 1.8–8)
NEUTS SEG NFR BLD: 58.5 % (ref 32–75)
NRBC # BLD: 0 K/UL (ref 0–0.01)
NRBC BLD-RTO: 0 PER 100 WBC
PLATELET # BLD AUTO: 105 K/UL (ref 150–400)
PMV BLD AUTO: 12.4 FL (ref 8.9–12.9)
RBC # BLD AUTO: 4.01 M/UL (ref 4.1–5.7)
T4 FREE SERPL-MCNC: 1.4 NG/DL (ref 0.8–1.5)
TRIGL SERPL-MCNC: 32 MG/DL
TSH SERPL DL<=0.05 MIU/L-ACNC: 2.33 UIU/ML (ref 0.36–3.74)
VIT B12 SERPL-MCNC: 877 PG/ML (ref 193–986)
VLDLC SERPL CALC-MCNC: 6.4 MG/DL
WBC # BLD AUTO: 8.2 K/UL (ref 4.1–11.1)

## 2025-04-21 PROCEDURE — G0439 PPPS, SUBSEQ VISIT: HCPCS | Performed by: FAMILY MEDICINE

## 2025-04-21 PROCEDURE — 99214 OFFICE O/P EST MOD 30 MIN: CPT | Performed by: FAMILY MEDICINE

## 2025-04-21 PROCEDURE — G8419 CALC BMI OUT NRM PARAM NOF/U: HCPCS | Performed by: FAMILY MEDICINE

## 2025-04-21 PROCEDURE — 1123F ACP DISCUSS/DSCN MKR DOCD: CPT | Performed by: FAMILY MEDICINE

## 2025-04-21 PROCEDURE — 1159F MED LIST DOCD IN RCRD: CPT | Performed by: FAMILY MEDICINE

## 2025-04-21 PROCEDURE — G2211 COMPLEX E/M VISIT ADD ON: HCPCS | Performed by: FAMILY MEDICINE

## 2025-04-21 PROCEDURE — G0446 INTENS BEHAVE THER CARDIO DX: HCPCS | Performed by: FAMILY MEDICINE

## 2025-04-21 PROCEDURE — G0444 DEPRESSION SCREEN ANNUAL: HCPCS | Performed by: FAMILY MEDICINE

## 2025-04-21 PROCEDURE — 1036F TOBACCO NON-USER: CPT | Performed by: FAMILY MEDICINE

## 2025-04-21 PROCEDURE — 1126F AMNT PAIN NOTED NONE PRSNT: CPT | Performed by: FAMILY MEDICINE

## 2025-04-21 PROCEDURE — G8427 DOCREV CUR MEDS BY ELIG CLIN: HCPCS | Performed by: FAMILY MEDICINE

## 2025-04-21 RX ORDER — HYDROCHLOROTHIAZIDE 25 MG/1
TABLET ORAL
COMMUNITY
Start: 2025-02-19

## 2025-04-21 SDOH — ECONOMIC STABILITY: FOOD INSECURITY: WITHIN THE PAST 12 MONTHS, THE FOOD YOU BOUGHT JUST DIDN'T LAST AND YOU DIDN'T HAVE MONEY TO GET MORE.: NEVER TRUE

## 2025-04-21 SDOH — ECONOMIC STABILITY: FOOD INSECURITY: WITHIN THE PAST 12 MONTHS, YOU WORRIED THAT YOUR FOOD WOULD RUN OUT BEFORE YOU GOT MONEY TO BUY MORE.: NEVER TRUE

## 2025-04-21 ASSESSMENT — PATIENT HEALTH QUESTIONNAIRE - PHQ9
SUM OF ALL RESPONSES TO PHQ QUESTIONS 1-9: 1
1. LITTLE INTEREST OR PLEASURE IN DOING THINGS: NOT AT ALL
SUM OF ALL RESPONSES TO PHQ QUESTIONS 1-9: 1
2. FEELING DOWN, DEPRESSED OR HOPELESS: SEVERAL DAYS

## 2025-04-21 ASSESSMENT — LIFESTYLE VARIABLES
HOW OFTEN DO YOU HAVE A DRINK CONTAINING ALCOHOL: 2-3 TIMES A WEEK
HOW MANY STANDARD DRINKS CONTAINING ALCOHOL DO YOU HAVE ON A TYPICAL DAY: 1 OR 2

## 2025-04-21 NOTE — ASSESSMENT & PLAN NOTE
Chronic, at goal (stable), continue current plan pending work up below    Orders:    CBC with Auto Differential; Future    Lipid Panel; Future

## 2025-04-21 NOTE — PROGRESS NOTES
Chief Complaint   Patient presents with    Medicare AWV    Fatigue    Sleep Problem     \"Have you been to the ER, urgent care clinic since your last visit?  Hospitalized since your last visit?\"    NO    “Have you seen or consulted any other health care providers outside of [facility] since your last visit?”    NO       Financial Resource Strain: Low Risk  (4/18/2024)    Overall Financial Resource Strain (CARDIA)     Difficulty of Paying Living Expenses: Not hard at all      Food Insecurity: No Food Insecurity (4/21/2025)    Hunger Vital Sign     Worried About Running Out of Food in the Last Year: Never true     Ran Out of Food in the Last Year: Never true            4/21/2025    10:04 AM   PHQ-9    Little interest or pleasure in doing things 0   Feeling down, depressed, or hopeless 1   PHQ-2 Score 1   PHQ-9 Total Score 1       Health Maintenance Due   Topic Date Due    COVID-19 Vaccine (1 - 2024-25 season) Never done    Annual Wellness Visit (Medicare)  04/19/2025    Lipids  04/18/2025    Depression Screen  04/18/2025    Prostate Specific Antigen (PSA) Screening or Monitoring  04/18/2025

## 2025-04-21 NOTE — PROGRESS NOTES
Patient Name: Jose Nelson   MRN: [id number]    ASSESSMENT AND PLAN  Jose Nelson is a 84 y.o. male who presents today for:    Assessment & Plan  Encounter for Medicare annual wellness exam          Atrial fibrillation, unspecified type (HCC)   Monitored by specialist- no acute findings meriting change in the plan         Stage 3a chronic kidney disease (HCC)   Monitored by specialist- no acute findings meriting change in the plan         Hypothyroidism, unspecified type   Chronic, at goal (stable), continue current plan pending work up below    Orders:    TSH + Free T4 Panel; Future    Essential (primary) hypertension   Chronic, at goal (stable), continue current treatment plan         Prediabetes   Chronic, at goal (stable), continue current plan pending work up below    Orders:    Hemoglobin A1C; Future    Hyperlipidemia, unspecified hyperlipidemia type   Chronic, at goal (stable), continue current plan pending work up below    Orders:    CBC with Auto Differential; Future    Lipid Panel; Future    Other fatigue    Possible due to deconditioning and/or untreated PABLO; will obtain labs and start PT.    Orders:    Vitamin B12 & Folate; Future    Gait instability   Chronic, not at goal (unstable), refer to PT    Orders:    External Referral To Physical Therapy    Posture abnormality    Chronic, not at goal (unstable), refer to PT    Orders:    External Referral To Physical Therapy    At risk for falling    Chronic, not at goal (unstable), refer to PT    Orders:    External Referral To Physical Therapy    Screening for cardiovascular condition     Orders:    [place] Intensive Behavior Counseling for Cardiovascular Diseases, 8-15 minutes []    Encounter for screening for other disorder [Z13.89]            No orders of the defined types were placed in this encounter.       There are no discontinued medications.    Return in about 6 months (around 10/21/2025).      SUBJECTIVE  Jose Nelson is a 84 y.o. male

## 2025-04-21 NOTE — PATIENT INSTRUCTIONS
Personalized Preventive Plan for Jose Nelson - 4/21/2025  Medicare offers a range of preventive health benefits. Some of the tests and screenings are paid in full while other may be subject to a deductible, co-insurance, and/or copay.  Some of these benefits include a comprehensive review of your medical history including lifestyle, illnesses that may run in your family, and various assessments and screenings as appropriate.  After reviewing your medical record and screening and assessments performed today your provider may have ordered immunizations, labs, imaging, and/or referrals for you.  A list of these orders (if applicable) as well as your Preventive Care list are included within your After Visit Summary for your review.

## 2025-04-21 NOTE — PROGRESS NOTES
Medicare Annual Wellness Visit    Jose Nelson is here for Medicare AWV, Fatigue, and Sleep Problem    Assessment & Plan   Encounter for Medicare annual wellness exam  Atrial fibrillation, unspecified type (HCC)  Assessment & Plan:   Monitored by specialist- no acute findings meriting change in the plan         Stage 3a chronic kidney disease (HCC)  Assessment & Plan:   Monitored by specialist- no acute findings meriting change in the plan         Hypothyroidism, unspecified type  Assessment & Plan:   Chronic, at goal (stable), continue current plan pending work up below    Orders:    TSH + Free T4 Panel; Future    Orders:  -     TSH + Free T4 Panel; Future  Essential (primary) hypertension  Assessment & Plan:   Chronic, at goal (stable), continue current treatment plan         Prediabetes  -     Hemoglobin A1C; Future  Hyperlipidemia, unspecified hyperlipidemia type  Assessment & Plan:   Chronic, at goal (stable), continue current plan pending work up below    Orders:    CBC with Auto Differential; Future    Lipid Panel; Future    Orders:  -     CBC with Auto Differential; Future  -     Lipid Panel; Future  Other fatigue  -     Vitamin B12 & Folate; Future  Gait instability  -     External Referral To Physical Therapy  Posture abnormality  -     External Referral To Physical Therapy  At risk for falling  -     External Referral To Physical Therapy  Screening for cardiovascular condition  -     [place] Intensive Behavior Counseling for Cardiovascular Diseases, 8-15 minutes []  Encounter for screening for other disorder [Z13.89]       Return in about 6 months (around 10/21/2025).     Subjective     Patient's complete Health Risk Assessment and screening values have been reviewed and are found in Flowsheets. The following problems were reviewed today and where indicated follow up appointments were made and/or referrals ordered.    Positive Risk Factor Screenings with Interventions:    Fall Risk:  Do you feel

## 2025-04-22 ENCOUNTER — RESULTS FOLLOW-UP (OUTPATIENT)
Age: 85
End: 2025-04-22

## 2025-07-28 RX ORDER — LEVOTHYROXINE SODIUM 150 UG/1
TABLET ORAL
Qty: 90 TABLET | Refills: 3 | Status: SHIPPED | OUTPATIENT
Start: 2025-07-28

## 2025-07-28 NOTE — TELEPHONE ENCOUNTER
PCP: Tiffani Lance MD    Last appt: 4/21/2025       Future Appointments   Date Time Provider Department Center   10/21/2025 10:00 AM Tiffani Lance MD [facility]       Requested Prescriptions     Pending Prescriptions Disp Refills    levothyroxine (SYNTHROID) 150 MCG tablet [Pharmacy Med Name: L-THYROXINE (SYNTHROID) TABS 150MCG] 90 tablet 3     Sig: TAKE 1 TABLET BEFORE BREAKFAST       Prior labs and Blood pressures:  BP Readings from Last 3 Encounters:   04/21/25 134/68   10/10/24 130/64   04/18/24 116/66     Lab Results   Component Value Date/Time     10/10/2024 03:06 PM    K 4.8 10/10/2024 03:06 PM     10/10/2024 03:06 PM    CO2 28 10/10/2024 03:06 PM    BUN 33 10/10/2024 03:06 PM    GFRAA 47 04/14/2022 11:04 AM     No results found for: \"HBA1C\", \"[id number]\"  Lab Results   Component Value Date/Time    CHOL 88 04/21/2025 10:54 AM    HDL 49 04/21/2025 10:54 AM    LDL 32.6 04/21/2025 10:54 AM    LDL 40.4 04/18/2024 10:07 AM    VLDL 6.4 04/21/2025 10:54 AM     No results found for: \"VITD3\"    Lab Results   Component Value Date/Time    TSH 2.33 04/21/2025 10:54 AM

## 2025-08-19 LAB
CREATININE, EXTERNAL: 1.73
GFR, ESTIMATED: 38

## (undated) DEVICE — SUTURE STRATAFIX SYMMETRIC PDS + SZ 1 L18IN ABSRB VLT L48MM SXPP1A400

## (undated) DEVICE — KENDALL SCD EXPRESS SLEEVES, KNEE LENGTH, MEDIUM: Brand: KENDALL SCD

## (undated) DEVICE — TAPE,CLOTH/SILK,CURAD,3"X10YD,LF,40/CS: Brand: CURAD

## (undated) DEVICE — HANDLE LT SNAP ON ULT DURABLE LENS FOR TRUMPF ALC DISPOSABLE

## (undated) DEVICE — CUSTOM CAST PD STR

## (undated) DEVICE — STRYKER PERFORMANCE SERIES SAGITTAL BLADE: Brand: STRYKER PERFORMANCE SERIES

## (undated) DEVICE — STRAP POS KNEE BODY VELC

## (undated) DEVICE — STERILE POLYISOPRENE POWDER-FREE SURGICAL GLOVES WITH EMOLLIENT COATING: Brand: PROTEXIS

## (undated) DEVICE — Z DISCONTINUED USE 2744636  DRESSING AQUACEL 14 IN ALG W3.5XL14IN POLYUR FLM CVR W/ HYDRCOLL

## (undated) DEVICE — (D)PREP SKN CHLRAPRP APPL 26ML -- CONVERT TO ITEM 371833

## (undated) DEVICE — SUTURE VCRL SZ 1 L36IN ABSRB UD L36MM CT-1 1/2 CIR J947H

## (undated) DEVICE — HANDPIECE SET WITH BONE CLEANING TIP AND SUCTION TUBE: Brand: INTERPULSE

## (undated) DEVICE — REM POLYHESIVE ADULT PATIENT RETURN ELECTRODE: Brand: VALLEYLAB

## (undated) DEVICE — SOLUTION IRRIG 3000ML 0.9% SOD CHL FLX CONT 0797208] ICU MEDICAL INC]

## (undated) DEVICE — Device

## (undated) DEVICE — T4 HOOD

## (undated) DEVICE — CONTAINER,SPECIMEN,3OZ,OR STRL: Brand: MEDLINE

## (undated) DEVICE — DRSG AQUACEL SURG 3.5 X 10IN -- CONVERT TO ITEM 370183

## (undated) DEVICE — NEEDLE HYPO 22GA L1.5IN BLK S STL HUB POLYPR SHLD REG BVL

## (undated) DEVICE — DRAPE,EXTREMITY,89X128,STERILE: Brand: MEDLINE

## (undated) DEVICE — INFECTION CONTROL KIT SYS

## (undated) DEVICE — SYR LR LCK 1ML GRAD NSAF 30ML --

## (undated) DEVICE — SLIM BODY SKIN STAPLER: Brand: APPOSE ULC

## (undated) DEVICE — CARTRIDGE BNE CEM MIX UNIV TWR VAC ROTOR BRK OFF NOZ W/O

## (undated) DEVICE — 4-PORT MANIFOLD: Brand: NEPTUNE 2

## (undated) DEVICE — 3M™ IOBAN™ 2 ANTIMICROBIAL INCISE DRAPE 6651EZ: Brand: IOBAN™ 2

## (undated) DEVICE — SCRUB DRY SURG EZ SCRUB BRUSH PREOPERATIVE GRN

## (undated) DEVICE — SUTURE VCRL SZ 2-0 L36IN ABSRB UD L40MM CT 1/2 CIR J957H

## (undated) DEVICE — TRAY CATH 16F URIN MTR LTX -- CONVERT TO ITEM 363111

## (undated) DEVICE — BANDAGE COMPR M W6INXL10YD WHT BGE VELC E MTRX HK AND LOOP

## (undated) DEVICE — SOLUTION IRRIG 1000ML H2O STRL BLT

## (undated) DEVICE — STERILE POLYISOPRENE POWDER-FREE SURGICAL GLOVES: Brand: PROTEXIS